# Patient Record
Sex: MALE | Race: WHITE | NOT HISPANIC OR LATINO | Employment: FULL TIME | URBAN - NONMETROPOLITAN AREA
[De-identification: names, ages, dates, MRNs, and addresses within clinical notes are randomized per-mention and may not be internally consistent; named-entity substitution may affect disease eponyms.]

---

## 2017-02-07 ENCOUNTER — APPOINTMENT (OUTPATIENT)
Dept: URGENT CARE | Facility: PHYSICIAN GROUP | Age: 37
End: 2017-02-07
Payer: COMMERCIAL

## 2017-02-07 ENCOUNTER — OCCUPATIONAL MEDICINE (OUTPATIENT)
Dept: URGENT CARE | Facility: PHYSICIAN GROUP | Age: 37
End: 2017-02-07

## 2017-02-07 DIAGNOSIS — Z01.10 ENCOUNTER FOR HEARING TEST: ICD-10-CM

## 2017-02-07 PROCEDURE — 99204 OFFICE O/P NEW MOD 45 MIN: CPT | Performed by: NURSE PRACTITIONER

## 2017-02-07 ASSESSMENT — ENCOUNTER SYMPTOMS
COUGH: 0
SORE THROAT: 0

## 2017-02-07 NOTE — MR AVS SNAPSHOT
Bharath Joe   2017 9:35 AM   Appointment   MRN: [id number]    Department:  [facility]   Dept Phone:  [phone]    Description:  Male : 1980   Provider:  MONTEZ Anne           Allergies as of 2017     No Known Allergies      Vital Signs     Smoking Status                   Former Smoker           Basic Information     Date Of Birth Sex Race Ethnicity Preferred Language    1980 Male Unknown Unknown English      Problem List              ICD-10-CM Priority Class Noted - Resolved    Back pain M54.9   2011 - Present      Health Maintenance        Date Due Completion Dates    IMM DTaP/Tdap/Td Vaccine (1 - Tdap) 10/9/1998 ---    IMM INFLUENZA (1) 2016 ---            Current Immunizations     No immunizations on file.      Below and/or attached are the medications your provider expects you to take. Review all of your home medications and newly ordered medications with your provider and/or pharmacist. Follow medication instructions as directed by your provider and/or pharmacist. Please keep your medication list with you and share with your provider. Update the information when medications are discontinued, doses are changed, or new medications (including over-the-counter products) are added; and carry medication information at all times in the event of emergency situations     Allergies:  No Known Allergies          Medications  Valid as of: 2017 - 10:10 AM    Generic Name Brand Name Tablet Size Instructions for use    Ibuprofen (Tab) MOTRIN 200 MG Take 400 mg by mouth every 8 hours as needed.        Ibuprofen (Tab) MOTRIN 800 MG Take 1 Tab by mouth every 8 hours as needed for Mild Pain.        Methocarbamol (Tab) ROBAXIN 750 MG Take 1 Tab by mouth 3 times a day.        MethylPREDNISolone (Tab) MEDROL DOSPACK 4 MG Take  by mouth every day. follow package directions        TraMADol HCl (Tab) ULTRAM 50 MG Take 1-2 Tabs by mouth every 8 hours as  needed for Mild Pain.        TraMADol HCl (Tab) ULTRAM 50 MG Take 1-2 Tabs by mouth every four hours as needed for Mild Pain.        .                 Medicines prescribed today were sent to:     [id number] - [place], [place] - [address] AT [facility] [address]    [address] [address]    Phone: [phone] Fax: [phone]    Open 24 Hours?: No      Medication refill instructions:       If your prescription bottle indicates you have medication refills left, it is not necessary to call your provider’s office. Please contact your pharmacy and they will refill your medication.    If your prescription bottle indicates you do not have any refills left, you may request refills at any time through one of the following ways: The online SpareFoot system (except Urgent Care), by calling your provider’s office, or by asking your pharmacy to contact your provider’s office with a refill request. Medication refills are processed only during regular business hours and may not be available until the next business day. Your provider may request additional information or to have a follow-up visit with you prior to refilling your medication.   *Please Note: Medication refills are assigned a new Rx number when refilled electronically. Your pharmacy may indicate that no refills were authorized even though a new prescription for the same medication is available at the pharmacy. Please request the medicine by name with the pharmacy before contacting your provider for a refill.           SpareFoot Access Code: [email or web address]  Expires: 3/9/2017 10:10 AM    Your email address is not on file at [employer] [facility].  Email Addresses are required for you to sign up for SpareFoot, please contact [phone] to verify your personal information and to provide your email address prior to attempting to register for SpareFoot.    Bharath Diaz  [address] [place] [address]    SpareFoot  A secure, online tool to manage  your health information     [facility] [employer]’s Safer Minicabst® is a secure, online tool that connects you to your personalized health information from the privacy of your home -- day or night - making it very easy for you to manage your healthcare. Once the activation process is completed, you can even access your medical information using the Safer Minicabst gregory, which is available for free in the Apple Gregory store or Google Play store.     To learn more about BlogBus, visit [email or web address]    There are two levels of access available (as shown below):   My Chart Features  [facility] Primary Care Doctor [facility]  Specialists [facility]  Urgent  Care Non-[facility] Primary Care Doctor   Email your healthcare team securely and privately 24/7 X X X    Manage appointments: schedule your next appointment; view details of past/upcoming appointments X      Request prescription refills. X      View recent personal medical records, including lab and immunizations X X X X   View health record, including health history, allergies, medications X X X X   Read reports about your outpatient visits, procedures, consult and ER notes X X X X   See your discharge summary, which is a recap of your hospital and/or ER visit that includes your diagnosis, lab results, and care plan X X  X     How to register for BlogBus:  Once your e-mail address has been verified, follow the following steps to sign up for BlogBus.     1. Go to  [email or web address]  2. Click on the Sign Up Now box, which takes you to the New Member Sign Up page. You will need to provide the following information:  a. Enter your BlogBus Access Code exactly as it appears at the top of this page. (You will not need to use this code after you’ve completed the sign-up process. If you do not sign up before the expiration date, you must request a new code.)   b. Enter your date of birth.   c. Enter your home email address.   d. Click Submit, and follow the next screen’s instructions.  3. Create a  MyChart ID. This will be your MojoPagest login ID and cannot be changed, so think of one that is secure and easy to remember.  4. Create a AqueSys password. You can change your password at any time.  5. Enter your Password Reset Question and Answer. This can be used at a later time if you forget your password.   6. Enter your e-mail address. This allows you to receive e-mail notifications when new information is available in AqueSys.  7. Click Sign Up. You can now view your health information.    For assistance activating your AqueSys account, call [phone]

## 2017-02-08 NOTE — PROGRESS NOTES
Subjective:      Bharath Diaz is a 36 y.o. male who presents with No chief complaint on file.            HPI Comments:   Routine occupational audiometry due to exposure to loud noises at work.      Review of Systems   HENT: Negative for congestion, ear discharge, ear pain, hearing loss, sore throat and tinnitus.    Respiratory: Negative for cough.           Objective:     There were no vitals taken for this visit.     Physical Exam   HENT:   Right Ear: Tympanic membrane and ear canal normal. Tympanic membrane is not perforated and not erythematous.   Left Ear: Tympanic membrane and ear canal normal. Tympanic membrane is not perforated and not erythematous.   Vitals reviewed.              Assessment/Plan:       1. Encounter for hearing test         Audiometry reviewed. No significant abnormalities

## 2017-06-02 ENCOUNTER — NON-PROVIDER VISIT (OUTPATIENT)
Dept: URGENT CARE | Facility: CLINIC | Age: 37
End: 2017-06-02

## 2017-06-02 DIAGNOSIS — Z02.1 PRE-EMPLOYMENT DRUG SCREENING: ICD-10-CM

## 2017-06-02 LAB
AMP AMPHETAMINE: NORMAL
COC COCAINE: NORMAL
INT CON NEG: NORMAL
INT CON POS: NORMAL
MET METHAMPHETAMINES: NORMAL
OPI OPIATES: NORMAL
PCP PHENCYCLIDINE: NORMAL
POC DRUG COMMENT 753798-OCCUPATIONAL HEALTH: NORMAL
THC: NORMAL

## 2017-06-02 PROCEDURE — 80305 DRUG TEST PRSMV DIR OPT OBS: CPT | Performed by: NURSE PRACTITIONER

## 2017-06-07 ENCOUNTER — OFFICE VISIT (OUTPATIENT)
Dept: URGENT CARE | Facility: PHYSICIAN GROUP | Age: 37
End: 2017-06-07
Payer: COMMERCIAL

## 2017-06-07 VITALS
BODY MASS INDEX: 37.2 KG/M2 | DIASTOLIC BLOOD PRESSURE: 86 MMHG | HEIGHT: 67 IN | SYSTOLIC BLOOD PRESSURE: 130 MMHG | HEART RATE: 100 BPM | OXYGEN SATURATION: 96 % | WEIGHT: 237 LBS | TEMPERATURE: 99.1 F | RESPIRATION RATE: 18 BRPM

## 2017-06-07 DIAGNOSIS — R09.81 NASAL CONGESTION: ICD-10-CM

## 2017-06-07 PROCEDURE — 99214 OFFICE O/P EST MOD 30 MIN: CPT | Performed by: PHYSICIAN ASSISTANT

## 2017-06-07 RX ORDER — FLUTICASONE PROPIONATE 50 MCG
2 SPRAY, SUSPENSION (ML) NASAL DAILY
Qty: 1 BOTTLE | Refills: 1 | Status: SHIPPED | OUTPATIENT
Start: 2017-06-07 | End: 2017-10-24

## 2017-06-07 ASSESSMENT — ENCOUNTER SYMPTOMS
WHEEZING: 0
COUGH: 1
SHORTNESS OF BREATH: 1
FEVER: 0
CHILLS: 0

## 2017-06-07 NOTE — Clinical Note
June 7, 2017         Patient: Bharath Diaz   YOB: 1980   Date of Visit: 6/7/2017           To Whom it May Concern:    Bharath Diaz was seen in my clinic on 6/7/2017.   If you have any questions or concerns, please don't hesitate to call.        Sincerely,           VERNON Hope.  Electronically Signed

## 2017-06-07 NOTE — MR AVS SNAPSHOT
"Bharath Diaz   2017 5:25 PM   Office Visit   MRN: [id number]    Department:  [facility]   Dept Phone:  [phone]    Description:  Male : 1980   Provider:  JEAN Hope           Reason for Visit     Other dr note      Allergies as of 2017     No Known Allergies      You were diagnosed with     Nasal congestion   [[id number]]         Vital Signs     Blood Pressure Pulse Temperature Respirations Height Weight    130/86 mmHg 100 37.3 °C (99.1 °F) 18 1.702 m (5' 7.01\") 107.502 kg (237 lb)    Body Mass Index Oxygen Saturation Smoking Status             37.11 kg/m2 96% Former Smoker         Basic Information     Date Of Birth Sex Race Ethnicity Preferred Language    1980 Male Unknown Unknown English      Problem List              ICD-10-CM Priority Class Noted - Resolved    Back pain M54.9   2011 - Present      Health Maintenance        Date Due Completion Dates    IMM DTaP/Tdap/Td Vaccine (1 - Tdap) 10/9/1998 ---            Current Immunizations     No immunizations on file.      Below and/or attached are the medications your provider expects you to take. Review all of your home medications and newly ordered medications with your provider and/or pharmacist. Follow medication instructions as directed by your provider and/or pharmacist. Please keep your medication list with you and share with your provider. Update the information when medications are discontinued, doses are changed, or new medications (including over-the-counter products) are added; and carry medication information at all times in the event of emergency situations     Allergies:  No Known Allergies          Medications  Valid as of: 2017 -  5:49 PM    Generic Name Brand Name Tablet Size Instructions for use    .                 Medicines prescribed today were sent to:     [id number] - [place], [address] AT [facility] [address]     [address] [place] [address]   " Phone: [phone] Fax: [phone]    Open 24 Hours?: No      Medication refill instructions:       If your prescription bottle indicates you have medication refills left, it is not necessary to call your provider’s office. Please contact your pharmacy and they will refill your medication.    If your prescription bottle indicates you do not have any refills left, you may request refills at any time through one of the following ways: The online [employer] system (except Urgent Care), by calling your provider’s office, or by asking your pharmacy to contact your provider’s office with a refill request. Medication refills are processed only during regular business hours and may not be available until the next business day. Your provider may request additional information or to have a follow-up visit with you prior to refilling your medication.   *Please Note: Medication refills are assigned a new Rx number when refilled electronically. Your pharmacy may indicate that no refills were authorized even though a new prescription for the same medication is available at the pharmacy. Please request the medicine by name with the pharmacy before contacting your provider for a refill.        Instructions    Use Tylenol and/or ibuprofen as needed for pain or fever.  Use a Michael Med, Neti Pot, or other nasal irrigation device daily.  Use Flonase daily.  May try a short course of a decongestant such as Sudafed.  May try Afrin nasal spray for 3-5 days and then discontinue use.  May try an over-the-counter antihistamine such as Zyrtec, Claritin, or Allegra.  Use a cool mist humidifier with distilled water.  Elevate the head of your bed a few inches.  Drink plenty of fluids and get adequate rest.  Follow up with primary care provider in a few days if not improving.  Return for new or worsening symptoms.            Nar[employer]ana Technologies Access Code: [email or web address]  Expires: 7/2/2017  3:51 PM    Your email address is not on file at [facility].   Email Addresses are required for you to sign up for Ai2 UK, please contact [phone] to verify your personal information and to provide your email address prior to attempting to register for Ai2 UK.    Bharath Joe  [address] [place] [address]    Ai2 UK  A secure, online tool to manage your health information     [employer]’s Ai2 UK® is a secure, online tool that connects you to your personalized health information from the privacy of your home -- day or night - making it very easy for you to manage your healthcare. Once the activation process is completed, you can even access your medical information using the Ai2 UK gregory, which is available for free in the Apple Gregory store or Google Play store.     To learn more about Ai2 UK, visit [email or web address]    There are two levels of access available (as shown below):   My Chart Features  [facility] Primary [facility] Doctor [facility]  Specialists [facility]  Urgent  Care [facility] Primary Care Doctor   Email your healthcare team securely and privately 24/7 X X X    Manage appointments: schedule your next appointment; view details of past/upcoming appointments X      Request prescription refills. X      View recent personal medical records, including lab and immunizations X X X X   View health record, including health history, allergies, medications X X X X   Read reports about your outpatient visits, procedures, consult and ER notes X X X X   See your discharge summary, which is a recap of your hospital and/or ER visit that includes your diagnosis, lab results, and care plan X X  X     How to register for iZocat:  Once your e-mail address has been verified, follow the following steps to sign up for iZocat.     1. Go to  [email or web address]  2. Click on the Sign Up Now box, which takes you to the New Member Sign Up page. You will need to provide the following information:  a. Enter your Ai2 UK Access Code exactly as it appears at the top of this  page. (You will not need to use this code after you’ve completed the sign-up process. If you do not sign up before the expiration date, you must request a new code.)   b. Enter your date of birth.   c. Enter your home email address.   d. Click Submit, and follow the next screen’s instructions.  3. Create a fsboWOW ID. This will be your fsboWOW login ID and cannot be changed, so think of one that is secure and easy to remember.  4. Create a fsboWOW password. You can change your password at any time.  5. Enter your Password Reset Question and Answer. This can be used at a later time if you forget your password.   6. Enter your e-mail address. This allows you to receive e-mail notifications when new information is available in fsboWOW.  7. Click Sign Up. You can now view your health information.    For assistance activating your fsboWOW account, call [phone]

## 2017-06-08 NOTE — PATIENT INSTRUCTIONS
Use Tylenol and/or ibuprofen as needed for pain or fever.  Use a Michael Med, Neti Pot, or other nasal irrigation device daily.  Use Flonase daily.  May try a short course of a decongestant such as Sudafed.  May try Afrin nasal spray for 3-5 days and then discontinue use.  May try an over-the-counter antihistamine such as Zyrtec, Claritin, or Allegra.  Use a cool mist humidifier with distilled water.  Elevate the head of your bed a few inches.  Drink plenty of fluids and get adequate rest.  Follow up with primary care provider in a few days if not improving.  Return for new or worsening symptoms.

## 2017-06-08 NOTE — PROGRESS NOTES
"Subjective:      Bharath Diaz is a 36 y.o. male who presents with Other            HPI Comments: Patient presents today with nasal congestion, cough, and occasional shortness of breath for the last couple of weeks to months. 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 He has used OTC decongestants with mild improvement in nasal symptoms. No fevers, chills, or other complaints.    Other  This is a new problem. The current episode started more than 1 month ago. The problem occurs constantly. The problem has been waxing and waning. Associated symptoms include congestion and coughing. Pertinent negatives include no chills or fever. Exacerbated by: dust. Treatments tried: OTC decongestant. The treatment provided mild relief.       Review of Systems   Constitutional: Negative for fever and chills.   HENT: Positive for congestion.    Respiratory: Positive for cough and shortness of breath. Negative for wheezing.        Allergies:Review of patient's allergies indicates no known allergies.    Current Outpatient Prescriptions Ordered in [facility]   Medication Sig Dispense Refill   • fluticasone (FLONASE) 50 MCG/ACT nasal spray Spray 2 Sprays in nose every day. 1 Bottle 1     No current Epic-ordered facility-administered medications on file.       History reviewed. No pertinent past medical history.    Social History   Substance Use Topics   • Smoking status: Former Smoker -- 0.50 packs/day for 18 years     Types: Cigarettes     Quit date: 09/12/2011   • Smokeless tobacco: Never Used   • Alcohol Use: No      Comment: binge drinker- intreatment now 04/10/11       No family status information on file.     Family History   Problem Relation Age of Onset   • Thyroid Mother         Objective:     /86 mmHg  Pulse 100  Temp(Src) 37.3 °C (99.1 °F)  Resp 18  Ht 1.702 m (5' 7.01\")  Wt 107.502 kg (237 lb)  BMI 37.11 kg/m2  SpO2 96%     Physical Exam "   Constitutional: He is oriented to person, place, and time. He appears well-developed and well-nourished. No distress.   HENT:   Head: Normocephalic and atraumatic.   Right Ear: External ear normal.   Left Ear: External ear normal.   Mouth/Throat: Oropharynx is clear and moist.   Moderate nasal mucosal edema. Turbinates are erythematous. Small amount of clear nasal discharge present. No sinus tenderness   Eyes: Right eye exhibits no discharge. Left eye exhibits no discharge.   Neck: Normal range of motion. Neck supple.   Cardiovascular: Normal rate and regular rhythm.    Pulmonary/Chest: Effort normal and breath sounds normal. He has no wheezes. He has no rales.   Nonproductive cough   Neurological: He is alert and oriented to person, place, and time.   Skin: Skin is warm and dry. He is not diaphoretic.   Psychiatric: He has a normal mood and affect. His behavior is normal. Judgment and thought content normal.   Nursing note and vitals reviewed.              Assessment/Plan:     1. Nasal congestion  fluticasone (FLONASE) 50 MCG/ACT nasal spray    Nasal mucosal edema with small amount of clear nasal mucus and no sinus tenderness. Recommended Flonase and irrigation. Follow-up with PCP       Use Tylenol and/or ibuprofen as needed for pain or fever.  Use a Michael Med, Neti Pot, or other nasal irrigation device daily.  Use Flonase daily.  May try a short course of a decongestant such as Sudafed.  May try Afrin nasal spray for 3-5 days and then discontinue use.  May try an over-the-counter antihistamine such as Zyrtec, Claritin, or Allegra.  Use a cool mist humidifier with distilled water.  Elevate the head of your bed a few inches.  Drink plenty of fluids and get adequate rest.  Follow up with primary care provider in a few days if not improving.  Return for new or worsening symptoms.      Please note that this dictation was created using voice recognition software. I have made every reasonable attempt to correct obvious  errors, but I expect that there are errors of grammar and possibly content that I did not discover before finalizing the note.

## 2017-06-15 ENCOUNTER — NON-PROVIDER VISIT (OUTPATIENT)
Dept: URGENT CARE | Facility: PHYSICIAN GROUP | Age: 37
End: 2017-06-15

## 2017-06-15 DIAGNOSIS — Z02.1 PRE-EMPLOYMENT DRUG SCREENING: ICD-10-CM

## 2017-06-15 LAB
AMP AMPHETAMINE: NORMAL
COC COCAINE: NORMAL
INT CON NEG: NEGATIVE
INT CON POS: POSITIVE
MET METHAMPHETAMINES: NORMAL
OPI OPIATES: NORMAL
PCP PHENCYCLIDINE: NORMAL
POC DRUG COMMENT 753798-OCCUPATIONAL HEALTH: NEGATIVE
THC: NORMAL

## 2017-06-15 PROCEDURE — 80305 DRUG TEST PRSMV DIR OPT OBS: CPT | Performed by: FAMILY MEDICINE

## 2017-10-24 ENCOUNTER — OFFICE VISIT (OUTPATIENT)
Dept: URGENT CARE | Facility: PHYSICIAN GROUP | Age: 37
End: 2017-10-24
Payer: COMMERCIAL

## 2017-10-24 VITALS
SYSTOLIC BLOOD PRESSURE: 120 MMHG | OXYGEN SATURATION: 97 % | HEART RATE: 97 BPM | HEIGHT: 67 IN | WEIGHT: 220 LBS | DIASTOLIC BLOOD PRESSURE: 72 MMHG | TEMPERATURE: 98.5 F | RESPIRATION RATE: 16 BRPM | BODY MASS INDEX: 34.53 KG/M2

## 2017-10-24 DIAGNOSIS — R10.84 GENERALIZED ABDOMINAL PAIN: ICD-10-CM

## 2017-10-24 PROCEDURE — 99214 OFFICE O/P EST MOD 30 MIN: CPT | Performed by: PHYSICIAN ASSISTANT

## 2017-10-24 ASSESSMENT — ENCOUNTER SYMPTOMS
NAUSEA: 0
ANOREXIA: 1
FEVER: 0
VOMITING: 0
ABDOMINAL PAIN: 1
CONSTIPATION: 0
CHILLS: 1
DIARRHEA: 1

## 2017-10-25 NOTE — PATIENT INSTRUCTIONS
"Smoking Cessation, Tips for Success  If you are ready to quit smoking, congratulations! You have chosen to help yourself be healthier. Cigarettes bring nicotine, tar, carbon monoxide, and other irritants into your body. Your lungs, heart, and blood vessels will be able to work better without these poisons. There are many different ways to quit smoking. Nicotine gum, nicotine patches, a nicotine inhaler, or nicotine nasal spray can help with physical craving. Hypnosis, support groups, and medicines help break the habit of smoking.  WHAT THINGS CAN I DO TO MAKE QUITTING EASIER?   Here are some tips to help you quit for good:  · Pick a date when you will quit smoking completely. Tell all of your friends and family about your plan to quit on that date.  · Do not try to slowly cut down on the number of cigarettes you are smoking. Pick a quit date and quit smoking completely starting on that day.  · Throw away all cigarettes.    · Clean and remove all ashtrays from your home, work, and car.  · On a card, write down your reasons for quitting. Carry the card with you and read it when you get the urge to smoke.  · Cleanse your body of nicotine. Drink enough water and fluids to keep your urine clear or pale yellow. Do this after quitting to flush the nicotine from your body.  · Learn to predict your moods. Do not let a bad situation be your excuse to have a cigarette. Some situations in your life might tempt you into wanting a cigarette.  · Never have \"just one\" cigarette. It leads to wanting another and another. Remind yourself of your decision to quit.  · Change habits associated with smoking. If you smoked while driving or when feeling stressed, try other activities to replace smoking. Stand up when drinking your coffee. Brush your teeth after eating. Sit in a different chair when you read the paper. Avoid alcohol while trying to quit, and try to drink fewer caffeinated beverages. Alcohol and caffeine may urge you to " "smoke.  · Avoid foods and drinks that can trigger a desire to smoke, such as sugary or spicy foods and alcohol.  · Ask people who smoke not to smoke around you.  · Have something planned to do right after eating or having a cup of coffee. For example, plan to take a walk or exercise.  · Try a relaxation exercise to calm you down and decrease your stress. Remember, you may be tense and nervous for the first 2 weeks after you quit, but this will pass.  · Find new activities to keep your hands busy. Play with a pen, coin, or rubber band. Doodle or draw things on paper.  · Brush your teeth right after eating. This will help cut down on the craving for the taste of tobacco after meals. You can also try mouthwash.    · Use oral substitutes in place of cigarettes. Try using lemon drops, carrots, cinnamon sticks, or chewing gum. Keep them handy so they are available when you have the urge to smoke.  · When you have the urge to smoke, try deep breathing.  · Designate your home as a nonsmoking area.  · If you are a heavy smoker, ask your health care provider about a prescription for nicotine chewing gum. It can ease your withdrawal from nicotine.  · Reward yourself. Set aside the cigarette money you save and buy yourself something nice.  · Look for support from others. Join a support group or smoking cessation program. Ask someone at home or at work to help you with your plan to quit smoking.  · Always ask yourself, \"Do I need this cigarette or is this just a reflex?\" Tell yourself, \"Today, I choose not to smoke,\" or \"I do not want to smoke.\" You are reminding yourself of your decision to quit.  · Do not replace cigarette smoking with electronic cigarettes (commonly called e-cigarettes). The safety of e-cigarettes is unknown, and some may contain harmful chemicals.  · If you relapse, do not give up! Plan ahead and think about what you will do the next time you get the urge to smoke.  HOW WILL I FEEL WHEN I QUIT SMOKING?  You " may have symptoms of withdrawal because your body is used to nicotine (the addictive substance in cigarettes). You may crave cigarettes, be irritable, feel very hungry, cough often, get headaches, or have difficulty concentrating. The withdrawal symptoms are only temporary. They are strongest when you first quit but will go away within 10-14 days. When withdrawal symptoms occur, stay in control. Think about your reasons for quitting. Remind yourself that these are signs that your body is healing and getting used to being without cigarettes. Remember that withdrawal symptoms are easier to treat than the major diseases that smoking can cause.   Even after the withdrawal is over, expect periodic urges to smoke. However, these cravings are generally short lived and will go away whether you smoke or not. Do not smoke!  WHAT RESOURCES ARE AVAILABLE TO HELP ME QUIT SMOKING?  Your health care provider can direct you to community resources or hospitals for support, which may include:  · Group support.  · Education.  · Hypnosis.  · Therapy.     This information is not intended to replace advice given to you by your health care provider. Make sure you discuss any questions you have with your health care provider.     Document Released: 09/15/2005 Document Revised: 01/08/2016 Document Reviewed: 06/05/2014  [employer] Interactive Patient Education ©2016 [employer]

## 2017-10-25 NOTE — PROGRESS NOTES
"Subjective:      Bharath Diaz is a 37 y.o. male who presents with Diarrhea (diarrhea, stomach cramps, dizziness x3days )            Abdominal pain and diarrhea for 3 days. Also reports cramping and dizziness. Reports numerous episodes of painful diarrhea. Unable to go to work due to current symptoms. Denies fever, chills, recent illness, recent exposure, recent travel, or antibiotic use.      Abdominal Pain   This is a new problem. The current episode started in the past 7 days. The problem occurs constantly. The problem has been gradually worsening. The pain is located in the generalized abdominal region. The pain is severe. The quality of the pain is cramping. The abdominal pain does not radiate. Associated symptoms include anorexia and diarrhea. Pertinent negatives include no constipation, fever, frequency, melena, nausea or vomiting. The pain is aggravated by eating, movement and palpation. The pain is relieved by nothing. He has tried nothing for the symptoms. The treatment provided no relief.       Review of Systems   Constitutional: Positive for chills. Negative for fever.   Gastrointestinal: Positive for abdominal pain, anorexia and diarrhea. Negative for constipation, melena, nausea and vomiting.   Genitourinary: Negative for frequency.          Objective:     /72   Pulse 97   Temp 36.9 °C (98.5 °F)   Resp 16   Ht 1.702 m (5' 7\")   Wt 99.8 kg (220 lb)   SpO2 97%   BMI 34.46 kg/m²      Physical Exam   Constitutional: He is oriented to person, place, and time. He appears well-developed and well-nourished. No distress.   HENT:   Head: Normocephalic and atraumatic.   Eyes: Right eye exhibits no discharge. Left eye exhibits no discharge.   Neck: Normal range of motion. Neck supple.   Cardiovascular: Normal rate and regular rhythm.    Pulmonary/Chest: Effort normal and breath sounds normal.   Abdominal: Soft. Bowel sounds are normal. He exhibits no distension. There is tenderness. There is " guarding.   Neurological: He is alert and oriented to person, place, and time.   Skin: Skin is warm and dry. He is not diaphoretic.   Psychiatric: He has a normal mood and affect. His behavior is normal. Judgment and thought content normal.   Nursing note and vitals reviewed.              Assessment/Plan:     1. Generalized abdominal pain      Ongoing for 3 days with diarrhea. No fever. Significant diffuse tenderness. Sent to ER for further evaluation. Discussed with REN[facility] Horta at [facility] who accepts patient. Transported POV across the street with wife          Please note that this dictation was created using voice recognition software. I have made every reasonable attempt to correct obvious errors, but I expect that there are errors of grammar and possibly content that I did not discover before finalizing the note.

## 2017-11-11 ENCOUNTER — NON-PROVIDER VISIT (OUTPATIENT)
Dept: URGENT CARE | Facility: PHYSICIAN GROUP | Age: 37
End: 2017-11-11
Payer: COMMERCIAL

## 2017-11-11 DIAGNOSIS — Z02.1 PRE-EMPLOYMENT DRUG SCREENING: ICD-10-CM

## 2017-11-11 LAB
AMP AMPHETAMINE: NORMAL
COC COCAINE: NORMAL
INT CON NEG: NORMAL
INT CON POS: NORMAL
MET METHAMPHETAMINES: NORMAL
OPI OPIATES: NORMAL
PCP PHENCYCLIDINE: NORMAL
POC DRUG COMMENT 753798-OCCUPATIONAL HEALTH: NEGATIVE
THC: NORMAL

## 2017-11-11 PROCEDURE — 80305 DRUG TEST PRSMV DIR OPT OBS: CPT | Performed by: PHYSICIAN ASSISTANT

## 2017-12-07 ENCOUNTER — SUPERVISING PHYSICIAN REVIEW (OUTPATIENT)
Dept: URGENT CARE | Facility: CLINIC | Age: 37
End: 2017-12-07

## 2018-01-30 ENCOUNTER — OFFICE VISIT (OUTPATIENT)
Dept: URGENT CARE | Facility: PHYSICIAN GROUP | Age: 38
End: 2018-01-30
Payer: COMMERCIAL

## 2018-01-30 VITALS
SYSTOLIC BLOOD PRESSURE: 116 MMHG | HEIGHT: 67 IN | RESPIRATION RATE: 16 BRPM | DIASTOLIC BLOOD PRESSURE: 68 MMHG | WEIGHT: 226 LBS | BODY MASS INDEX: 35.47 KG/M2 | OXYGEN SATURATION: 97 % | TEMPERATURE: 98.6 F | HEART RATE: 62 BPM

## 2018-01-30 DIAGNOSIS — B35.3 TINEA PEDIS OF RIGHT FOOT: ICD-10-CM

## 2018-01-30 PROCEDURE — 99214 OFFICE O/P EST MOD 30 MIN: CPT | Performed by: PHYSICIAN ASSISTANT

## 2018-01-30 RX ORDER — PRENATAL VIT 91/IRON/FOLIC/DHA 28-975-200
1 COMBINATION PACKAGE (EA) ORAL 2 TIMES DAILY
Qty: 15 G | Refills: 0 | Status: SHIPPED | OUTPATIENT
Start: 2018-01-30 | End: 2018-02-13

## 2018-01-30 ASSESSMENT — ENCOUNTER SYMPTOMS
MYALGIAS: 0
FEVER: 0
CHILLS: 0

## 2018-01-30 NOTE — PATIENT INSTRUCTIONS
Athlete's Foot  Athlete's foot (tinea pedis) is a fungal infection of the skin on the feet. It often occurs on the skin between the toes or underneath the toes. It can also occur on the soles of the feet. Athlete's foot is more likely to occur in hot, humid weather. Not washing your feet or changing your socks often enough can contribute to athlete's foot. The infection can spread from person to person (contagious).  CAUSES  Athlete's foot is caused by a fungus. This fungus thrives in warm, moist places. Most people get athlete's foot by sharing shower stalls, towels, and wet floors with an infected person. People with weakened immune systems, including those with diabetes, may be more likely to get athlete's foot.  SYMPTOMS   · Itchy areas between the toes or on the soles of the feet.  · White, flaky, or scaly areas between the toes or on the soles of the feet.  · Tiny, intensely itchy blisters between the toes or on the soles of the feet.  · Tiny cuts on the skin. These cuts can develop a bacterial infection.  · Thick or discolored toenails.  DIAGNOSIS   Your caregiver can usually tell what the problem is by doing a physical exam. Your caregiver may also take a skin sample from the rash area. The skin sample may be examined under a microscope, or it may be tested to see if fungus will grow in the sample. A sample may also be taken from your toenail for testing.  TREATMENT   Over-the-counter and prescription medicines can be used to kill the fungus. These medicines are available as powders or creams. Your caregiver can suggest medicines for you. Fungal infections respond slowly to treatment. You may need to continue using your medicine for several weeks.  PREVENTION   · Do not share towels.  · Wear sandals in wet areas, such as shared locker rooms and shared showers.  · Keep your feet dry. Wear shoes that allow air to circulate. Wear cotton or wool socks.  HOME CARE INSTRUCTIONS   · Take medicines as directed by  your caregiver. Do not use steroid creams on athlete's foot.  · Keep your feet clean and cool. Wash your feet daily and dry them thoroughly, especially between your toes.  · Change your socks every day. Wear cotton or wool socks. In hot climates, you may need to change your socks 2 to 3 times per day.  · Wear sandals or canvas tennis shoes with good air circulation.  · If you have blisters, soak your feet in Burow's solution or Epsom salts for 20 to 30 minutes, 2 times a day to dry out the blisters. Make sure you dry your feet thoroughly afterward.  SEEK MEDICAL CARE IF:   · You have a fever.  · You have swelling, soreness, warmth, or redness in your foot.  · You are not getting better after 7 days of treatment.  · You are not completely cured after 30 days.  · You have any problems caused by your medicines.  MAKE SURE YOU:   · Understand these instructions.  · Will watch your condition.  · Will get help right away if you are not doing well or get worse.     This information is not intended to replace advice given to you by your health care provider. Make sure you discuss any questions you have with your health care provider.     Document Released: 12/15/2001 Document Revised: 03/11/2013 Document Reviewed: 10/05/2012  [employer] Interactive Patient Education ©2016 [employer]      Smoking Cessation, Tips for Success  If you are ready to quit smoking, congratulations! You have chosen to help yourself be healthier. Cigarettes bring nicotine, tar, carbon monoxide, and other irritants into your body. Your lungs, heart, and blood vessels will be able to work better without these poisons. There are many different ways to quit smoking. Nicotine gum, nicotine patches, a nicotine inhaler, or nicotine nasal spray can help with physical craving. Hypnosis, support groups, and medicines help break the habit of smoking.  WHAT THINGS CAN I DO TO MAKE QUITTING EASIER?   Here are some tips to help you quit for good:  · Pick a date  "when you will quit smoking completely. Tell all of your friends and family about your plan to quit on that date.  · Do not try to slowly cut down on the number of cigarettes you are smoking. Pick a quit date and quit smoking completely starting on that day.  · Throw away all cigarettes.    · Clean and remove all ashtrays from your home, work, and car.  · On a card, write down your reasons for quitting. Carry the card with you and read it when you get the urge to smoke.  · Cleanse your body of nicotine. Drink enough water and fluids to keep your urine clear or pale yellow. Do this after quitting to flush the nicotine from your body.  · Learn to predict your moods. Do not let a bad situation be your excuse to have a cigarette. Some situations in your life might tempt you into wanting a cigarette.  · Never have \"just one\" cigarette. It leads to wanting another and another. Remind yourself of your decision to quit.  · Change habits associated with smoking. If you smoked while driving or when feeling stressed, try other activities to replace smoking. Stand up when drinking your coffee. Brush your teeth after eating. Sit in a different chair when you read the paper. Avoid alcohol while trying to quit, and try to drink fewer caffeinated beverages. Alcohol and caffeine may urge you to smoke.  · Avoid foods and drinks that can trigger a desire to smoke, such as sugary or spicy foods and alcohol.  · Ask people who smoke not to smoke around you.  · Have something planned to do right after eating or having a cup of coffee. For example, plan to take a walk or exercise.  · Try a relaxation exercise to calm you down and decrease your stress. Remember, you may be tense and nervous for the first 2 weeks after you quit, but this will pass.  · Find new activities to keep your hands busy. Play with a pen, coin, or rubber band. Doodle or draw things on paper.  · Brush your teeth right after eating. This will help cut down on the craving " "for the taste of tobacco after meals. You can also try mouthwash.    · Use oral substitutes in place of cigarettes. Try using lemon drops, carrots, cinnamon sticks, or chewing gum. Keep them handy so they are available when you have the urge to smoke.  · When you have the urge to smoke, try deep breathing.  · Designate your home as a nonsmoking area.  · If you are a heavy smoker, ask your health care provider about a prescription for nicotine chewing gum. It can ease your withdrawal from nicotine.  · Reward yourself. Set aside the cigarette money you save and buy yourself something nice.  · Look for support from others. Join a support group or smoking cessation program. Ask someone at home or at work to help you with your plan to quit smoking.  · Always ask yourself, \"Do I need this cigarette or is this just a reflex?\" Tell yourself, \"Today, I choose not to smoke,\" or \"I do not want to smoke.\" You are reminding yourself of your decision to quit.  · Do not replace cigarette smoking with electronic cigarettes (commonly called e-cigarettes). The safety of e-cigarettes is unknown, and some may contain harmful chemicals.  · If you relapse, do not give up! Plan ahead and think about what you will do the next time you get the urge to smoke.  HOW WILL I FEEL WHEN I QUIT SMOKING?  You may have symptoms of withdrawal because your body is used to nicotine (the addictive substance in cigarettes). You may crave cigarettes, be irritable, feel very hungry, cough often, get headaches, or have difficulty concentrating. The withdrawal symptoms are only temporary. They are strongest when you first quit but will go away within 10-14 days. When withdrawal symptoms occur, stay in control. Think about your reasons for quitting. Remind yourself that these are signs that your body is healing and getting used to being without cigarettes. Remember that withdrawal symptoms are easier to treat than the major diseases that smoking can cause. "   Even after the withdrawal is over, expect periodic urges to smoke. However, these cravings are generally short lived and will go away whether you smoke or not. Do not smoke!  WHAT RESOURCES ARE AVAILABLE TO HELP ME QUIT SMOKING?  Your health care provider can direct you to community resources or hospitals for support, which may include:  · Group support.  · Education.  · Hypnosis.  · Therapy.     This information is not intended to replace advice given to you by your health care provider. Make sure you discuss any questions you have with your health care provider.     Document Released: 09/15/2005 Document Revised: 01/08/2016 Document Reviewed: 06/05/2014  [employer] Interactive Patient Education ©2016 [employer]

## 2018-01-30 NOTE — PROGRESS NOTES
"Subjective:      Bharath Diaz is a 37 y.o. male who presents with Foot Problem (x6days R foot)            Patient resents today with suspected athlete's foot between his right fourth and fifth toes which has been present for the last 6 days and seems to be worsening despite an antifungal spray that he has been using. Denies any other complaints.      Foot Problem   This is a new problem. The current episode started in the past 7 days. The problem occurs constantly. The problem has been gradually worsening. Pertinent negatives include no chills, fever, myalgias or rash. Nothing aggravates the symptoms. Treatments tried: antifungal spray. The treatment provided no relief.       Review of Systems   Constitutional: Negative for chills and fever.   Musculoskeletal: Negative for myalgias.   Skin: Positive for itching. Negative for rash.     Allergies:Patient has no known allergies.    Current Outpatient Prescriptions Ordered in [facility]   Medication Sig Dispense Refill   • terbinafine (LAMISIL) 1 % cream Apply 1 Application to affected area(s) 2 times a day for 14 days. 15 g 0     No current Epic-ordered facility-administered medications on file.        History reviewed. No pertinent past medical history.    Social History   Substance Use Topics   • Smoking status: Current Every Day Smoker     Packs/day: 0.00     Years: 18.00     Types: Cigars     Last attempt to quit: 9/12/2011   • Smokeless tobacco: Never Used      Comment: 1 cigar a day    • Alcohol use No      Comment: binge drinker- intreatment now 04/10/11       Family Status   Relation Status   • Mother      Family History   Problem Relation Age of Onset   • Thyroid Mother           Objective:     /68   Pulse 62   Temp 37 °C (98.6 °F)   Resp 16   Ht 1.702 m (5' 7.01\")   Wt 102.5 kg (226 lb)   SpO2 97%   BMI 35.39 kg/m²      Physical Exam   Constitutional: He is oriented to person, place, and time. He appears well-developed and well-nourished. No " distress.   HENT:   Head: Normocephalic and atraumatic.   Eyes: Right eye exhibits no discharge. Left eye exhibits no discharge.   Neck: Normal range of motion. Neck supple.   Cardiovascular: Normal rate.    Pulmonary/Chest: Effort normal.   Neurological: He is alert and oriented to person, place, and time.   Skin: Skin is warm and dry. He is not diaphoretic.   Cracked, dry, irritated skin between the fourth and fifth toes of the right foot. No signs of secondary bacterial infection   Psychiatric: He has a normal mood and affect. His behavior is normal. Judgment and thought content normal.   Nursing note and vitals reviewed.              Assessment/Plan:     1. Tinea pedis of right foot  terbinafine (LAMISIL) 1 % cream    [employer]. Not responding to aerosol. Start terbinafine cream. Follow-up with PCP. Return if worsening. Given written instructions.       [employer] Interactive Patient Education given: Tinea pedis    Please note that this dictation was created using voice recognition software. I have made every reasonable attempt to correct obvious errors, but I expect that there are errors of grammar and possibly content that I did not discover before finalizing the note.

## 2018-03-14 ENCOUNTER — HOSPITAL ENCOUNTER (OUTPATIENT)
Dept: RADIOLOGY | Facility: MEDICAL CENTER | Age: 38
End: 2018-03-14

## 2018-03-14 ENCOUNTER — HOSPITAL ENCOUNTER (OUTPATIENT)
Facility: MEDICAL CENTER | Age: 38
End: 2018-03-14
Attending: EMERGENCY MEDICINE | Admitting: SPECIALIST
Payer: COMMERCIAL

## 2018-03-14 ENCOUNTER — APPOINTMENT (OUTPATIENT)
Dept: RADIOLOGY | Facility: MEDICAL CENTER | Age: 38
End: 2018-03-14
Attending: EMERGENCY MEDICINE
Payer: COMMERCIAL

## 2018-03-14 VITALS
HEART RATE: 73 BPM | TEMPERATURE: 98.9 F | OXYGEN SATURATION: 91 % | BODY MASS INDEX: 34.53 KG/M2 | SYSTOLIC BLOOD PRESSURE: 124 MMHG | WEIGHT: 220 LBS | HEIGHT: 67 IN | DIASTOLIC BLOOD PRESSURE: 72 MMHG | RESPIRATION RATE: 16 BRPM
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
## 2018-03-14 DIAGNOSIS — S02.31XB OPEN FRACTURE OF RIGHT ORBITAL FLOOR, INITIAL ENCOUNTER (HCC): ICD-10-CM

## 2018-03-14 PROBLEM — S02.30XB: Status: ACTIVE | Noted: 2018-03-14

## 2018-03-14 PROBLEM — S02.30XA CLOSED FRACTURE OF ORBITAL FLOOR (HCC): Status: ACTIVE | Noted: 2018-03-14

## 2018-03-14 PROBLEM — F10.929 ALCOHOL INTOXICATION (HCC): Status: ACTIVE | Noted: 2018-03-14

## 2018-03-14 PROBLEM — Z53.09 CONTRAINDICATION TO DEEP VEIN THROMBOSIS (DVT) PROPHYLAXIS: Status: ACTIVE | Noted: 2018-03-14

## 2018-03-14 PROBLEM — T14.90XA TRAUMA: Status: ACTIVE | Noted: 2018-03-14

## 2018-03-14 PROBLEM — S01.111A: Status: ACTIVE | Noted: 2018-03-14

## 2018-03-14 PROCEDURE — 110454 HCHG SHELL REV 250: Performed by: SPECIALIST

## 2018-03-14 PROCEDURE — 700102 HCHG RX REV CODE 250 W/ 637 OVERRIDE(OP)

## 2018-03-14 PROCEDURE — 160036 HCHG PACU - EA ADDL 30 MINS PHASE I: Performed by: SPECIALIST

## 2018-03-14 PROCEDURE — 99291 CRITICAL CARE FIRST HOUR: CPT

## 2018-03-14 PROCEDURE — 700111 HCHG RX REV CODE 636 W/ 250 OVERRIDE (IP): Performed by: NURSE PRACTITIONER

## 2018-03-14 PROCEDURE — G0378 HOSPITAL OBSERVATION PER HR: HCPCS

## 2018-03-14 PROCEDURE — 501404 HCHG SPLINT, NASAL DOYLE AIRWAY: Performed by: SPECIALIST

## 2018-03-14 PROCEDURE — 700111 HCHG RX REV CODE 636 W/ 250 OVERRIDE (IP)

## 2018-03-14 PROCEDURE — 76705 ECHO EXAM OF ABDOMEN: CPT

## 2018-03-14 PROCEDURE — 502573 HCHG PACK, ENT: Performed by: SPECIALIST

## 2018-03-14 PROCEDURE — 160035 HCHG PACU - 1ST 60 MINS PHASE I: Performed by: SPECIALIST

## 2018-03-14 PROCEDURE — 500331 HCHG COTTONOID, SURG PATTIE: Performed by: SPECIALIST

## 2018-03-14 PROCEDURE — 96374 THER/PROPH/DIAG INJ IV PUSH: CPT

## 2018-03-14 PROCEDURE — 501838 HCHG SUTURE GENERAL: Performed by: SPECIALIST

## 2018-03-14 PROCEDURE — 160009 HCHG ANES TIME/MIN: Performed by: SPECIALIST

## 2018-03-14 PROCEDURE — 700105 HCHG RX REV CODE 258: Performed by: EMERGENCY MEDICINE

## 2018-03-14 PROCEDURE — A9270 NON-COVERED ITEM OR SERVICE: HCPCS

## 2018-03-14 PROCEDURE — 160029 HCHG SURGERY MINUTES - 1ST 30 MINS LEVEL 4: Performed by: SPECIALIST

## 2018-03-14 PROCEDURE — 700111 HCHG RX REV CODE 636 W/ 250 OVERRIDE (IP): Performed by: EMERGENCY MEDICINE

## 2018-03-14 PROCEDURE — G0390 TRAUMA RESPONS W/HOSP CRITI: HCPCS

## 2018-03-14 PROCEDURE — 96375 TX/PRO/DX INJ NEW DRUG ADDON: CPT

## 2018-03-14 PROCEDURE — 700101 HCHG RX REV CODE 250

## 2018-03-14 PROCEDURE — 160048 HCHG OR STATISTICAL LEVEL 1-5: Performed by: SPECIALIST

## 2018-03-14 PROCEDURE — 160002 HCHG RECOVERY MINUTES (STAT): Performed by: SPECIALIST

## 2018-03-14 PROCEDURE — 71045 X-RAY EXAM CHEST 1 VIEW: CPT

## 2018-03-14 PROCEDURE — 160041 HCHG SURGERY MINUTES - EA ADDL 1 MIN LEVEL 4: Performed by: SPECIALIST

## 2018-03-14 RX ORDER — ACETAMINOPHEN 325 MG/1
650 TABLET ORAL EVERY 4 HOURS PRN
Status: DISCONTINUED | OUTPATIENT
Start: 2018-03-14 | End: 2018-03-14 | Stop reason: HOSPADM

## 2018-03-14 RX ORDER — BACITRACIN ZINC AND POLYMYXIN B SULFATE 500; 1000 [USP'U]/G; [USP'U]/G
OINTMENT TOPICAL 3 TIMES DAILY
Status: DISCONTINUED | OUTPATIENT
Start: 2018-03-14 | End: 2018-03-14 | Stop reason: HOSPADM

## 2018-03-14 RX ORDER — MORPHINE SULFATE 4 MG/ML
4 INJECTION, SOLUTION INTRAMUSCULAR; INTRAVENOUS
Status: DISCONTINUED | OUTPATIENT
Start: 2018-03-14 | End: 2018-03-14 | Stop reason: HOSPADM

## 2018-03-14 RX ORDER — AMOXICILLIN 250 MG
1 CAPSULE ORAL NIGHTLY
Status: DISCONTINUED | OUTPATIENT
Start: 2018-03-14 | End: 2018-03-14 | Stop reason: HOSPADM

## 2018-03-14 RX ORDER — BALANCED SALT SOLUTION 6.4; .75; .48; .3; 3.9; 1.7 MG/ML; MG/ML; MG/ML; MG/ML; MG/ML; MG/ML
SOLUTION OPHTHALMIC
Status: DISCONTINUED | OUTPATIENT
Start: 2018-03-14 | End: 2018-03-14 | Stop reason: HOSPADM

## 2018-03-14 RX ORDER — SODIUM CHLORIDE 9 MG/ML
1000 INJECTION, SOLUTION INTRAVENOUS ONCE
Status: COMPLETED | OUTPATIENT
Start: 2018-03-14 | End: 2018-03-14

## 2018-03-14 RX ORDER — TETRACAINE HYDROCHLORIDE 5 MG/ML
SOLUTION OPHTHALMIC
Status: DISCONTINUED
Start: 2018-03-14 | End: 2018-03-14 | Stop reason: HOSPADM

## 2018-03-14 RX ORDER — LIDOCAINE HYDROCHLORIDE 10 MG/ML
INJECTION, SOLUTION EPIDURAL; INFILTRATION; INTRACAUDAL; PERINEURAL
Status: DISCONTINUED
Start: 2018-03-14 | End: 2018-03-14 | Stop reason: HOSPADM

## 2018-03-14 RX ORDER — OXYCODONE HYDROCHLORIDE 5 MG/1
10 TABLET ORAL
Status: DISCONTINUED | OUTPATIENT
Start: 2018-03-14 | End: 2018-03-14 | Stop reason: HOSPADM

## 2018-03-14 RX ORDER — ACETAMINOPHEN 650 MG/1
650 SUPPOSITORY RECTAL EVERY 4 HOURS PRN
Status: DISCONTINUED | OUTPATIENT
Start: 2018-03-14 | End: 2018-03-14 | Stop reason: HOSPADM

## 2018-03-14 RX ORDER — SODIUM CHLORIDE 9 MG/ML
INJECTION, SOLUTION INTRAVENOUS CONTINUOUS
Status: DISCONTINUED | OUTPATIENT
Start: 2018-03-14 | End: 2018-03-14 | Stop reason: HOSPADM

## 2018-03-14 RX ORDER — POLYETHYLENE GLYCOL 3350 17 G/17G
1 POWDER, FOR SOLUTION ORAL 2 TIMES DAILY
Status: DISCONTINUED | OUTPATIENT
Start: 2018-03-14 | End: 2018-03-14 | Stop reason: HOSPADM

## 2018-03-14 RX ORDER — PHENYLEPHRINE HYDROCHLORIDE 25 MG/ML
SOLUTION/ DROPS OPHTHALMIC
Status: DISCONTINUED
Start: 2018-03-14 | End: 2018-03-14 | Stop reason: HOSPADM

## 2018-03-14 RX ORDER — BUPIVACAINE HYDROCHLORIDE AND EPINEPHRINE 5; 5 MG/ML; UG/ML
INJECTION, SOLUTION EPIDURAL; INTRACAUDAL; PERINEURAL
Status: DISCONTINUED | OUTPATIENT
Start: 2018-03-14 | End: 2018-03-14 | Stop reason: HOSPADM

## 2018-03-14 RX ORDER — OXYMETAZOLINE HYDROCHLORIDE 0.05 G/100ML
SPRAY NASAL
Status: DISCONTINUED | OUTPATIENT
Start: 2018-03-14 | End: 2018-03-14 | Stop reason: HOSPADM

## 2018-03-14 RX ORDER — LIDOCAINE HYDROCHLORIDE AND EPINEPHRINE 10; 10 MG/ML; UG/ML
INJECTION, SOLUTION INFILTRATION; PERINEURAL
Status: DISCONTINUED | OUTPATIENT
Start: 2018-03-14 | End: 2018-03-14 | Stop reason: HOSPADM

## 2018-03-14 RX ORDER — OXYCODONE HCL 5 MG/5 ML
SOLUTION, ORAL ORAL
Status: COMPLETED
Start: 2018-03-14 | End: 2018-03-14

## 2018-03-14 RX ORDER — ONDANSETRON 2 MG/ML
4 INJECTION INTRAMUSCULAR; INTRAVENOUS EVERY 4 HOURS PRN
Status: DISCONTINUED | OUTPATIENT
Start: 2018-03-14 | End: 2018-03-14 | Stop reason: HOSPADM

## 2018-03-14 RX ORDER — FAMOTIDINE 20 MG/1
20 TABLET, FILM COATED ORAL 2 TIMES DAILY
Status: DISCONTINUED | OUTPATIENT
Start: 2018-03-14 | End: 2018-03-14 | Stop reason: HOSPADM

## 2018-03-14 RX ORDER — EPINEPHRINE 1 MG/ML(1)
AMPUL (ML) INJECTION
Status: DISCONTINUED | OUTPATIENT
Start: 2018-03-14 | End: 2018-03-14 | Stop reason: HOSPADM

## 2018-03-14 RX ORDER — CEFAZOLIN SODIUM 2 G/100ML
2 INJECTION, SOLUTION INTRAVENOUS ONCE
Status: COMPLETED | OUTPATIENT
Start: 2018-03-14 | End: 2018-03-14

## 2018-03-14 RX ORDER — DOCUSATE SODIUM 100 MG/1
100 CAPSULE, LIQUID FILLED ORAL 2 TIMES DAILY
Status: DISCONTINUED | OUTPATIENT
Start: 2018-03-14 | End: 2018-03-14 | Stop reason: HOSPADM

## 2018-03-14 RX ORDER — OXYCODONE HYDROCHLORIDE 5 MG/1
5 TABLET ORAL
Status: DISCONTINUED | OUTPATIENT
Start: 2018-03-14 | End: 2018-03-14 | Stop reason: HOSPADM

## 2018-03-14 RX ORDER — NEOMYCIN SULFATE, POLYMYXIN B SULFATE, AND DEXAMETHASONE 3.5; 10000; 1 MG/G; [USP'U]/G; MG/G
OINTMENT OPHTHALMIC
Status: DISCONTINUED | OUTPATIENT
Start: 2018-03-14 | End: 2018-03-14 | Stop reason: HOSPADM

## 2018-03-14 RX ORDER — BUPIVACAINE HYDROCHLORIDE AND EPINEPHRINE 5; 5 MG/ML; UG/ML
INJECTION, SOLUTION EPIDURAL; INTRACAUDAL; PERINEURAL
Status: DISCONTINUED
Start: 2018-03-14 | End: 2018-03-14 | Stop reason: HOSPADM

## 2018-03-14 RX ORDER — CHLORHEXIDINE GLUCONATE ORAL RINSE 1.2 MG/ML
15 SOLUTION DENTAL EVERY 12 HOURS
Status: DISCONTINUED | OUTPATIENT
Start: 2018-03-14 | End: 2018-03-14

## 2018-03-14 RX ORDER — SODIUM CHLORIDE, SODIUM LACTATE, POTASSIUM CHLORIDE, CALCIUM CHLORIDE 600; 310; 30; 20 MG/100ML; MG/100ML; MG/100ML; MG/100ML
INJECTION, SOLUTION INTRAVENOUS CONTINUOUS
Status: DISCONTINUED | OUTPATIENT
Start: 2018-03-14 | End: 2018-03-14 | Stop reason: HOSPADM

## 2018-03-14 RX ORDER — AMOXICILLIN 250 MG
1 CAPSULE ORAL
Status: DISCONTINUED | OUTPATIENT
Start: 2018-03-14 | End: 2018-03-14 | Stop reason: HOSPADM

## 2018-03-14 RX ORDER — OXYMETAZOLINE HYDROCHLORIDE 0.05 G/100ML
SPRAY NASAL
Status: DISCONTINUED
Start: 2018-03-14 | End: 2018-03-14 | Stop reason: HOSPADM

## 2018-03-14 RX ORDER — ENEMA 19; 7 G/133ML; G/133ML
1 ENEMA RECTAL
Status: DISCONTINUED | OUTPATIENT
Start: 2018-03-14 | End: 2018-03-14 | Stop reason: HOSPADM

## 2018-03-14 RX ORDER — BISACODYL 10 MG
10 SUPPOSITORY, RECTAL RECTAL
Status: DISCONTINUED | OUTPATIENT
Start: 2018-03-14 | End: 2018-03-14 | Stop reason: HOSPADM

## 2018-03-14 RX ADMIN — MORPHINE SULFATE 4 MG: 4 INJECTION INTRAVENOUS at 08:50

## 2018-03-14 RX ADMIN — OXYCODONE HYDROCHLORIDE 10 MG: 5 SOLUTION ORAL at 14:35

## 2018-03-14 RX ADMIN — CEFAZOLIN SODIUM 2 G: 2 INJECTION, SOLUTION INTRAVENOUS at 05:57

## 2018-03-14 RX ADMIN — ONDANSETRON HYDROCHLORIDE 4 MG: 2 INJECTION, SOLUTION INTRAMUSCULAR; INTRAVENOUS at 08:59

## 2018-03-14 RX ADMIN — SODIUM CHLORIDE 1000 ML: 9 INJECTION, SOLUTION INTRAVENOUS at 05:57

## 2018-03-14 RX ADMIN — FENTANYL CITRATE 50 MCG: 50 INJECTION INTRAMUSCULAR; INTRAVENOUS at 06:00

## 2018-03-14 ASSESSMENT — ENCOUNTER SYMPTOMS
NECK PAIN: 0
NAUSEA: 0
BACK PAIN: 0
SHORTNESS OF BREATH: 0
DIZZINESS: 0
FLANK PAIN: 0
SORE THROAT: 0
BLURRED VISION: 0
HEADACHES: 0
WEAKNESS: 0
ABDOMINAL PAIN: 0
VOMITING: 0

## 2018-03-14 ASSESSMENT — PAIN SCALES - GENERAL
PAINLEVEL_OUTOF10: 7
PAINLEVEL_OUTOF10: 0

## 2018-03-14 NOTE — DISCHARGE INSTRUCTIONS
ACTIVITY: Rest and take it easy for the first 24 hours.  A responsible adult is recommended to remain with you during that time.  It is normal to feel sleepy.  We encourage you to not do anything that requires balance, judgment or coordination.    MILD FLU-LIKE SYMPTOMS ARE NORMAL. YOU MAY EXPERIENCE GENERALIZED MUSCLE ACHES, THROAT IRRITATION, HEADACHE AND/OR SOME NAUSEA.    FOR 24 HOURS DO NOT:  Drive, operate machinery or run household appliances.  Drink beer or alcoholic beverages.   Make important decisions or sign legal documents.    SPECIAL INSTRUCTIONS: Ice pack to eye for first 24-48 hours for 20 minutes at a time.     DIET: To avoid nausea, slowly advance diet as tolerated, avoiding spicy or greasy foods for the first day.  Add more substantial food to your diet according to your physician's instructions.  Babies can be fed formula or breast milk as soon as they are hungry.  INCREASE FLUIDS AND FIBER TO AVOID CONSTIPATION.    SURGICAL DRESSING/BATHING: Keep clean and dry.     FOLLOW-UP APPOINTMENT:  A follow-up appointment should be arranged with your doctor; call to schedule.    You should CALL YOUR PHYSICIAN if you develop:  Fever greater than 101 degrees F.  Pain not relieved by medication, or persistent nausea or vomiting.  Excessive bleeding (blood soaking through dressing) or unexpected drainage from the wound.  Extreme redness or swelling around the incision site, drainage of pus or foul smelling drainage.  Inability to urinate or empty your bladder within 8 hours.  Problems with breathing or chest pain.    You should call [phone] if you develop problems with breathing or chest pain.  If you are unable to contact your doctor or surgical center, you should go to the nearest emergency room or urgent care center.  Physician's telephone #: [phone].    If any questions arise, call your doctor.  If your doctor is not available, please feel free to call the Surgical Center at [phone].  The Center  is open Monday through Friday from 7AM to 7PM.  You can also call the HEALTH HOTLINE open 24 hours/day, 7 days/week and speak to a nurse at [phone], or toll free at [phone].    A registered nurse may call you a few days after your surgery to see how you are doing after your procedure.    MEDICATIONS: Resume taking daily medication.  Take prescribed pain medication with food.  If no medication is prescribed, you may take non-aspirin pain medication if needed.  PAIN MEDICATION CAN BE VERY CONSTIPATING.  Take a stool softener or laxative such as senokot, pericolace, or milk of magnesia if needed.    Prescription given for Norco, cephalexin, zofran.  Last pain medication given at 2:35pm (10mg oxycodone).    If your physician has prescribed pain medication that includes Acetaminophen (Tylenol), do not take additional Acetaminophen (Tylenol) while taking the prescribed medication.    Depression / Suicide Risk    As you are discharged from this [facility] facility, it is important to learn how to keep safe from harming yourself.    Recognize the warning signs:  · Abrupt changes in personality, positive or negative- including increase in energy   · Giving away possessions  · Change in eating patterns- significant weight changes-  positive or negative  · Change in sleeping patterns- unable to sleep or sleeping all the time   · Unwillingness or inability to communicate  · Depression  · Unusual sadness, discouragement and loneliness  · Talk of wanting to die  · Neglect of personal appearance   · Rebelliousness- reckless behavior  · Withdrawal from people/activities they love  · Confusion- inability to concentrate     If you or a loved one observes any of these behaviors or has concerns about self-harm, here's what you can do:  · Talk about it- your feelings and reasons for harming yourself  · Remove any means that you might use to hurt yourself (examples: pills, rope, extension cords, firearm)  · Get  professional help from the community (Mental Health, Substance Abuse, psychological counseling)  · Do not be alone:Call your Safe Contact- someone whom you trust who will be there for you.  · Call your local CRISIS HOTLINE 313-2399 or 574-989-1869  · Call your local Children's Mobile Crisis Response Team [place] (893) 572-0072 or www.[employer]  · Call the toll free National Suicide Prevention Hotlines   · National Suicide Prevention Lifeline 011-606-UMFG (6601)  · National Hope Line Network 800-SUICIDE (817-6476)

## 2018-03-14 NOTE — ED NOTES
Med rec updated and complete  Allergies reviewed  Pt reports no prescription medications, OTC'S, or vitamins.  Pt reports no antibiotics in the last 30 days.
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
## 2018-03-14 NOTE — PROGRESS NOTES
"Blood pressure 124/72, pulse 91, temperature 37.2 °C (98.9 °F), resp. rate (!) 23, height 1.702 m (5' 7\"), weight 99.8 kg (220 lb), SpO2 99 %.      Pt in recovery awake and alert.  Dr. Avalos has cleared pt for discharge to home with family.  Pt will follow up with Dr. Avalos as directed .  Pt will remain in PACU for standard time and nursing will call if any needs arise.   [phone] (Jeevan)     Tertiary surveyc completed with no further findings.   SBIRT completed, pt now has a DUI and will be required to complete substance abuse course.       "
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
## 2018-03-14 NOTE — PROGRESS NOTES
1350-Handoff from Vicky RAGLAND. VSS. Pt resting on left side sleeping, respirations unlabored and even. Right periorbital edema and bruising. Eye covered in ointment from OR.     1400-Pt will be discharged from PACU per Dr. Avalos. Pt denies pain. VSS.    1410-Nurse practitioner Jeevan for trauma wants to see pt before discharging him home. Pt's wife brought to bedside.     1416-Jeevan NP at bedside discussing POC with pt and his wife. Pt will be discharging home today.  Both eyes tracking.     1435-Pt medicated per mar prophylactically for pain. Pt received local anesthetic in OR. Pt given cold pack for eye and instructed to not apply any pressure.     1455-DC instructions reviewed with pt and his wife who verbalize understanding.     1544-Pt dressed, 2/10 tolerable pain, says he feels ready to DC home. Wife has written prescriptions from Dr. Avalos. Pt up to bathroom.     1547-Pt discharged home with wife, steady gait.
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
## 2018-07-16 ENCOUNTER — OFFICE VISIT (OUTPATIENT)
Dept: URGENT CARE | Facility: PHYSICIAN GROUP | Age: 38
End: 2018-07-16
Payer: COMMERCIAL

## 2018-07-16 ENCOUNTER — APPOINTMENT (OUTPATIENT)
Dept: RADIOLOGY | Facility: IMAGING CENTER | Age: 38
End: 2018-07-16
Attending: PHYSICIAN ASSISTANT
Payer: COMMERCIAL

## 2018-07-16 VITALS
HEIGHT: 67 IN | HEART RATE: 80 BPM | BODY MASS INDEX: 34.06 KG/M2 | RESPIRATION RATE: 16 BRPM | OXYGEN SATURATION: 96 % | WEIGHT: 217 LBS | TEMPERATURE: 98.9 F | DIASTOLIC BLOOD PRESSURE: 74 MMHG | SYSTOLIC BLOOD PRESSURE: 122 MMHG

## 2018-07-16 DIAGNOSIS — R06.02 SHORTNESS OF BREATH: ICD-10-CM

## 2018-07-16 PROCEDURE — 71046 X-RAY EXAM CHEST 2 VIEWS: CPT | Mod: TC,FY | Performed by: PHYSICIAN ASSISTANT

## 2018-07-16 PROCEDURE — 99214 OFFICE O/P EST MOD 30 MIN: CPT | Performed by: PHYSICIAN ASSISTANT

## 2018-07-16 RX ORDER — IPRATROPIUM BROMIDE AND ALBUTEROL SULFATE 2.5; .5 MG/3ML; MG/3ML
3 SOLUTION RESPIRATORY (INHALATION) ONCE
Status: COMPLETED | OUTPATIENT
Start: 2018-07-16 | End: 2018-07-16

## 2018-07-16 RX ORDER — ALBUTEROL SULFATE 90 UG/1
2 AEROSOL, METERED RESPIRATORY (INHALATION) EVERY 6 HOURS PRN
Qty: 8.5 G | Refills: 0 | Status: SHIPPED | OUTPATIENT
Start: 2018-07-16 | End: 2019-10-26

## 2018-07-16 RX ADMIN — IPRATROPIUM BROMIDE AND ALBUTEROL SULFATE 3 ML: 2.5; .5 SOLUTION RESPIRATORY (INHALATION) at 19:30

## 2018-07-17 ASSESSMENT — ENCOUNTER SYMPTOMS
HEMOPTYSIS: 0
SHORTNESS OF BREATH: 1
CHILLS: 0
SPUTUM PRODUCTION: 0
COUGH: 0
VOMITING: 0
PALPITATIONS: 0
ABDOMINAL PAIN: 0
NAUSEA: 0
DIZZINESS: 0
DIARRHEA: 0
SWOLLEN GLANDS: 0
ORTHOPNEA: 0
FEVER: 0
MUSCULOSKELETAL NEGATIVE: 1
WHEEZING: 0

## 2018-07-17 ASSESSMENT — COPD QUESTIONNAIRES: COPD: 0

## 2018-07-17 NOTE — PROGRESS NOTES
"Subjective:      Bharath Diaz is a 37 y.o. male who presents with Shortness of Breath (Pt states he feels like he cant get a full breath)        Patient is accompanied by his wife.     Shortness of Breath   This is a new problem. The current episode started 1 to 4 weeks ago (1 week). The problem occurs constantly. The problem has been waxing and waning. Pertinent negatives include no abdominal pain, chest pain, fever, hemoptysis, leg swelling, orthopnea, rash, sputum production, swollen glands, vomiting or wheezing. Nothing aggravates the symptoms. He has tried OTC cough suppressants for the symptoms. The treatment provided mild relief. There is no history of chronic lung disease, COPD, DVT, pneumonia or a recent surgery.     Patient presents to urgent care reporting a 1 week history of shortness of breath and feelings as if he can't take a full breath. Symptoms are exacerbated by exertion or deep inhalation. No fevers, chills, body aches, chest pain, wheezing, cough, congestion, or sore throat. No history of asthma or pneumonia. He denies recent surgeries, prolonged periods of immobilization, lower extremity edema, or history of blood clots.     Review of Systems   Constitutional: Negative for chills and fever.   HENT: Negative for congestion.    Respiratory: Positive for shortness of breath. Negative for cough, hemoptysis, sputum production and wheezing.    Cardiovascular: Negative for chest pain, palpitations, orthopnea and leg swelling.   Gastrointestinal: Negative for abdominal pain, diarrhea, nausea and vomiting.   Genitourinary: Negative.    Musculoskeletal: Negative.    Skin: Negative for rash.   Neurological: Negative for dizziness.        Objective:     /74   Pulse 80   Temp 37.2 °C (98.9 °F)   Resp 16   Ht 1.702 m (5' 7\")   Wt 98.4 kg (217 lb)   SpO2 96%   BMI 33.99 kg/m²      Physical Exam   Constitutional: He is oriented to person, place, and time. He appears well-developed and " well-nourished. No distress.   HENT:   Head: Normocephalic and atraumatic.   Eyes: Pupils are equal, round, and reactive to light.   Neck: Normal range of motion.   Cardiovascular: Normal rate, regular rhythm and normal heart sounds.  Exam reveals no friction rub.    No murmur heard.  Pulmonary/Chest: Effort normal and breath sounds normal. No respiratory distress. He has no wheezes. He has no rales.   Musculoskeletal: Normal range of motion.   No lower extremity edema bilaterally   Neurological: He is alert and oriented to person, place, and time.   Skin: Skin is warm and dry. He is not diaphoretic.   Psychiatric: He has a normal mood and affect. His behavior is normal.   Nursing note and vitals reviewed.         PMH:  has no past medical history on file.  MEDS:   Current Outpatient Prescriptions:   •  albuterol 108 (90 Base) MCG/ACT Aero Soln inhalation aerosol, Inhale 2 Puffs by mouth every 6 hours as needed for Shortness of Breath., Disp: 8.5 g, Rfl: 0  ALLERGIES: No Known Allergies  SURGHX:   Past Surgical History:   Procedure Laterality Date   • EYE LACERATION REPAIR Right 3/14/2018    Procedure: EYE LACERATION REPAIR-EYE LID REPAIR;  Surgeon: Mariann Avalos M.D.;  Location: SURGERY SAME DAY [facility];  Service: Ent     SOCHX:  reports that he has been smoking Cigars.  He has been smoking about 0.00 packs per day for the past 18.00 years. He has never used smokeless tobacco. He reports that he drinks alcohol. He reports that he uses drugs, including Inhaled and Marijuana.  FH: family history includes Thyroid in his mother.    Assessment/Plan:     1. Shortness of breath  - DX-CHEST-2 VIEWS; Future  Impression         1. No active cardiopulmonary abnormalities are identified.     report per radiology.  I have reviewed the films and agree with the reading    - ipratropium-albuterol (DUONEB) nebulizer solution; 3 mL by Nebulization route Once.   - Given in clinic with moderate relief of symptoms, pulse ox  improved to 100% after breathing treatment  - albuterol 108 (90 Base) MCG/ACT Aero Soln inhalation aerosol; Inhale 2 Puffs by mouth every 6 hours as needed for Shortness of Breath.  Dispense: 8.5 g; Refill: 0    Albuterol inhaler given to use as needed for symptomatic relief. Encouraged smoking cessation. Monitor symptoms closely and RTC or follow up with PCP if symptoms persist/worsen. The patient demonstrated a good understanding and agreed with the treatment plan.

## 2018-09-07 ENCOUNTER — HOSPITAL ENCOUNTER (OUTPATIENT)
Facility: MEDICAL CENTER | Age: 38
End: 2018-09-08
Attending: EMERGENCY MEDICINE | Admitting: HOSPITALIST
Payer: COMMERCIAL

## 2018-09-07 ENCOUNTER — OFFICE VISIT (OUTPATIENT)
Dept: URGENT CARE | Facility: CLINIC | Age: 38
End: 2018-09-07
Payer: COMMERCIAL

## 2018-09-07 ENCOUNTER — APPOINTMENT (OUTPATIENT)
Dept: RADIOLOGY | Facility: MEDICAL CENTER | Age: 38
End: 2018-09-07
Attending: EMERGENCY MEDICINE
Payer: COMMERCIAL

## 2018-09-07 VITALS
TEMPERATURE: 97.9 F | OXYGEN SATURATION: 98 % | HEART RATE: 82 BPM | HEIGHT: 67 IN | DIASTOLIC BLOOD PRESSURE: 78 MMHG | SYSTOLIC BLOOD PRESSURE: 122 MMHG | BODY MASS INDEX: 34.06 KG/M2 | WEIGHT: 217 LBS | RESPIRATION RATE: 16 BRPM

## 2018-09-07 DIAGNOSIS — R42 DIZZINESS: ICD-10-CM

## 2018-09-07 DIAGNOSIS — R68.84 JAW PAIN: ICD-10-CM

## 2018-09-07 DIAGNOSIS — R07.9 CHEST PAIN, UNSPECIFIED TYPE: ICD-10-CM

## 2018-09-07 DIAGNOSIS — R07.9 CHEST PAIN, UNSPECIFIED TYPE: Primary | ICD-10-CM

## 2018-09-07 PROBLEM — J45.909 ASTHMA: Status: ACTIVE | Noted: 2018-09-07

## 2018-09-07 PROBLEM — F10.10 ALCOHOL ABUSE: Status: ACTIVE | Noted: 2018-09-07

## 2018-09-07 PROBLEM — Z72.0 TOBACCO ABUSE: Status: ACTIVE | Noted: 2018-09-07

## 2018-09-07 PROBLEM — E66.9 OBESITY (BMI 35.0-39.9 WITHOUT COMORBIDITY): Status: ACTIVE | Noted: 2018-09-07

## 2018-09-07 PROBLEM — E78.5 DYSLIPIDEMIA: Status: ACTIVE | Noted: 2018-09-07

## 2018-09-07 LAB
ALBUMIN SERPL BCP-MCNC: 4 G/DL (ref 3.2–4.9)
ALBUMIN/GLOB SERPL: 1.3 G/DL
ALP SERPL-CCNC: 38 U/L (ref 30–99)
ALT SERPL-CCNC: 14 U/L (ref 2–50)
ANION GAP SERPL CALC-SCNC: 7 MMOL/L (ref 0–11.9)
APTT PPP: 27.9 SEC (ref 24.7–36)
AST SERPL-CCNC: 13 U/L (ref 12–45)
BASOPHILS # BLD AUTO: 0.9 % (ref 0–1.8)
BASOPHILS # BLD: 0.07 K/UL (ref 0–0.12)
BILIRUB SERPL-MCNC: 0.5 MG/DL (ref 0.1–1.5)
BNP SERPL-MCNC: 5 PG/ML (ref 0–100)
BUN SERPL-MCNC: 18 MG/DL (ref 8–22)
CALCIUM SERPL-MCNC: 9 MG/DL (ref 8.5–10.5)
CHLORIDE SERPL-SCNC: 106 MMOL/L (ref 96–112)
CO2 SERPL-SCNC: 24 MMOL/L (ref 20–33)
CREAT SERPL-MCNC: 0.85 MG/DL (ref 0.5–1.4)
EKG IMPRESSION: NORMAL
EOSINOPHIL # BLD AUTO: 0.11 K/UL (ref 0–0.51)
EOSINOPHIL NFR BLD: 1.4 % (ref 0–6.9)
ERYTHROCYTE [DISTWIDTH] IN BLOOD BY AUTOMATED COUNT: 45.8 FL (ref 35.9–50)
GLOBULIN SER CALC-MCNC: 3 G/DL (ref 1.9–3.5)
GLUCOSE SERPL-MCNC: 95 MG/DL (ref 65–99)
HCT VFR BLD AUTO: 42.3 % (ref 42–52)
HGB BLD-MCNC: 13.8 G/DL (ref 14–18)
IMM GRANULOCYTES # BLD AUTO: 0.01 K/UL (ref 0–0.11)
IMM GRANULOCYTES NFR BLD AUTO: 0.1 % (ref 0–0.9)
INR PPP: 0.95 (ref 0.87–1.13)
LIPASE SERPL-CCNC: 9 U/L (ref 11–82)
LYMPHOCYTES # BLD AUTO: 2.71 K/UL (ref 1–4.8)
LYMPHOCYTES NFR BLD: 35 % (ref 22–41)
MCH RBC QN AUTO: 29.7 PG (ref 27–33)
MCHC RBC AUTO-ENTMCNC: 32.6 G/DL (ref 33.7–35.3)
MCV RBC AUTO: 91.2 FL (ref 81.4–97.8)
MONOCYTES # BLD AUTO: 0.8 K/UL (ref 0–0.85)
MONOCYTES NFR BLD AUTO: 10.3 % (ref 0–13.4)
NEUTROPHILS # BLD AUTO: 4.05 K/UL (ref 1.82–7.42)
NEUTROPHILS NFR BLD: 52.3 % (ref 44–72)
NRBC # BLD AUTO: 0 K/UL
NRBC BLD-RTO: 0 /100 WBC
PLATELET # BLD AUTO: 229 K/UL (ref 164–446)
PMV BLD AUTO: 10.2 FL (ref 9–12.9)
POTASSIUM SERPL-SCNC: 4 MMOL/L (ref 3.6–5.5)
PROT SERPL-MCNC: 7 G/DL (ref 6–8.2)
PROTHROMBIN TIME: 12.4 SEC (ref 12–14.6)
RBC # BLD AUTO: 4.64 M/UL (ref 4.7–6.1)
SODIUM SERPL-SCNC: 137 MMOL/L (ref 135–145)
TROPONIN I SERPL-MCNC: <0.01 NG/ML (ref 0–0.04)
WBC # BLD AUTO: 7.8 K/UL (ref 4.8–10.8)

## 2018-09-07 PROCEDURE — 93000 ELECTROCARDIOGRAM COMPLETE: CPT | Performed by: PHYSICIAN ASSISTANT

## 2018-09-07 PROCEDURE — 83880 ASSAY OF NATRIURETIC PEPTIDE: CPT

## 2018-09-07 PROCEDURE — G0378 HOSPITAL OBSERVATION PER HR: HCPCS

## 2018-09-07 PROCEDURE — 700111 HCHG RX REV CODE 636 W/ 250 OVERRIDE (IP): Performed by: HOSPITALIST

## 2018-09-07 PROCEDURE — 93005 ELECTROCARDIOGRAM TRACING: CPT

## 2018-09-07 PROCEDURE — 85730 THROMBOPLASTIN TIME PARTIAL: CPT

## 2018-09-07 PROCEDURE — 84484 ASSAY OF TROPONIN QUANT: CPT

## 2018-09-07 PROCEDURE — 83690 ASSAY OF LIPASE: CPT

## 2018-09-07 PROCEDURE — 96372 THER/PROPH/DIAG INJ SC/IM: CPT

## 2018-09-07 PROCEDURE — 71045 X-RAY EXAM CHEST 1 VIEW: CPT

## 2018-09-07 PROCEDURE — 85025 COMPLETE CBC W/AUTO DIFF WBC: CPT

## 2018-09-07 PROCEDURE — 99220 PR INITIAL OBSERVATION CARE,LEVL III: CPT | Performed by: HOSPITALIST

## 2018-09-07 PROCEDURE — 99215 OFFICE O/P EST HI 40 MIN: CPT | Performed by: PHYSICIAN ASSISTANT

## 2018-09-07 PROCEDURE — 36415 COLL VENOUS BLD VENIPUNCTURE: CPT

## 2018-09-07 PROCEDURE — 80053 COMPREHEN METABOLIC PANEL: CPT

## 2018-09-07 PROCEDURE — 94760 N-INVAS EAR/PLS OXIMETRY 1: CPT

## 2018-09-07 PROCEDURE — 85610 PROTHROMBIN TIME: CPT

## 2018-09-07 PROCEDURE — 99285 EMERGENCY DEPT VISIT HI MDM: CPT

## 2018-09-07 PROCEDURE — 93005 ELECTROCARDIOGRAM TRACING: CPT | Performed by: EMERGENCY MEDICINE

## 2018-09-07 RX ORDER — ASPIRIN 81 MG/1
324 TABLET, CHEWABLE ORAL ONCE
Status: DISCONTINUED | OUTPATIENT
Start: 2018-09-07 | End: 2018-09-07

## 2018-09-07 RX ORDER — PROMETHAZINE HYDROCHLORIDE 25 MG/1
12.5-25 TABLET ORAL EVERY 4 HOURS PRN
Status: DISCONTINUED | OUTPATIENT
Start: 2018-09-07 | End: 2018-09-08 | Stop reason: HOSPADM

## 2018-09-07 RX ORDER — ONDANSETRON 2 MG/ML
4 INJECTION INTRAMUSCULAR; INTRAVENOUS EVERY 4 HOURS PRN
Status: DISCONTINUED | OUTPATIENT
Start: 2018-09-07 | End: 2018-09-08 | Stop reason: HOSPADM

## 2018-09-07 RX ORDER — POLYETHYLENE GLYCOL 3350 17 G/17G
1 POWDER, FOR SOLUTION ORAL
Status: DISCONTINUED | OUTPATIENT
Start: 2018-09-07 | End: 2018-09-08 | Stop reason: HOSPADM

## 2018-09-07 RX ORDER — AMOXICILLIN 250 MG
2 CAPSULE ORAL 2 TIMES DAILY
Status: DISCONTINUED | OUTPATIENT
Start: 2018-09-07 | End: 2018-09-08 | Stop reason: HOSPADM

## 2018-09-07 RX ORDER — ASPIRIN 81 MG/1
324 TABLET, CHEWABLE ORAL DAILY
Status: DISCONTINUED | OUTPATIENT
Start: 2018-09-08 | End: 2018-09-08 | Stop reason: HOSPADM

## 2018-09-07 RX ORDER — PROMETHAZINE HYDROCHLORIDE 25 MG/1
12.5-25 SUPPOSITORY RECTAL EVERY 4 HOURS PRN
Status: DISCONTINUED | OUTPATIENT
Start: 2018-09-07 | End: 2018-09-08 | Stop reason: HOSPADM

## 2018-09-07 RX ORDER — ASPIRIN 300 MG/1
300 SUPPOSITORY RECTAL DAILY
Status: DISCONTINUED | OUTPATIENT
Start: 2018-09-08 | End: 2018-09-08 | Stop reason: HOSPADM

## 2018-09-07 RX ORDER — OXYCODONE HYDROCHLORIDE 10 MG/1
10 TABLET ORAL
Status: DISCONTINUED | OUTPATIENT
Start: 2018-09-07 | End: 2018-09-08 | Stop reason: HOSPADM

## 2018-09-07 RX ORDER — OXYCODONE HYDROCHLORIDE 5 MG/1
5 TABLET ORAL
Status: DISCONTINUED | OUTPATIENT
Start: 2018-09-07 | End: 2018-09-08 | Stop reason: HOSPADM

## 2018-09-07 RX ORDER — BISACODYL 10 MG
10 SUPPOSITORY, RECTAL RECTAL
Status: DISCONTINUED | OUTPATIENT
Start: 2018-09-07 | End: 2018-09-08 | Stop reason: HOSPADM

## 2018-09-07 RX ORDER — HEPARIN SODIUM 5000 [USP'U]/ML
5000 INJECTION, SOLUTION INTRAVENOUS; SUBCUTANEOUS EVERY 8 HOURS
Status: DISCONTINUED | OUTPATIENT
Start: 2018-09-07 | End: 2018-09-08 | Stop reason: HOSPADM

## 2018-09-07 RX ORDER — ASPIRIN 325 MG
325 TABLET ORAL DAILY
Status: DISCONTINUED | OUTPATIENT
Start: 2018-09-08 | End: 2018-09-08 | Stop reason: HOSPADM

## 2018-09-07 RX ORDER — HYDROMORPHONE HYDROCHLORIDE 2 MG/ML
0.5 INJECTION, SOLUTION INTRAMUSCULAR; INTRAVENOUS; SUBCUTANEOUS
Status: DISCONTINUED | OUTPATIENT
Start: 2018-09-07 | End: 2018-09-08 | Stop reason: HOSPADM

## 2018-09-07 RX ORDER — ONDANSETRON 4 MG/1
4 TABLET, ORALLY DISINTEGRATING ORAL EVERY 4 HOURS PRN
Status: DISCONTINUED | OUTPATIENT
Start: 2018-09-07 | End: 2018-09-08 | Stop reason: HOSPADM

## 2018-09-07 RX ADMIN — HEPARIN SODIUM 5000 UNITS: 5000 INJECTION, SOLUTION INTRAVENOUS; SUBCUTANEOUS at 23:07

## 2018-09-07 RX ADMIN — ASPIRIN 324 MG: 81 TABLET, CHEWABLE ORAL at 18:39

## 2018-09-07 ASSESSMENT — LIFESTYLE VARIABLES
HAVE YOU EVER FELT YOU SHOULD CUT DOWN ON YOUR DRINKING: NO
HAVE PEOPLE ANNOYED YOU BY CRITICIZING YOUR DRINKING: NO
HOW MANY TIMES IN THE PAST YEAR HAVE YOU HAD 5 OR MORE DRINKS IN A DAY: 0
TOTAL SCORE: 0
ON A TYPICAL DAY WHEN YOU DRINK ALCOHOL HOW MANY DRINKS DO YOU HAVE: 0
TOTAL SCORE: 0
EVER HAD A DRINK FIRST THING IN THE MORNING TO STEADY YOUR NERVES TO GET RID OF A HANGOVER: NO
ALCOHOL_USE: YES
AVERAGE NUMBER OF DAYS PER WEEK YOU HAVE A DRINK CONTAINING ALCOHOL: 0
CONSUMPTION TOTAL: NEGATIVE
EVER_SMOKED: YES
TOTAL SCORE: 0
EVER FELT BAD OR GUILTY ABOUT YOUR DRINKING: NO

## 2018-09-07 ASSESSMENT — PATIENT HEALTH QUESTIONNAIRE - PHQ9
1. LITTLE INTEREST OR PLEASURE IN DOING THINGS: NOT AT ALL
SUM OF ALL RESPONSES TO PHQ9 QUESTIONS 1 AND 2: 0
2. FEELING DOWN, DEPRESSED, IRRITABLE, OR HOPELESS: NOT AT ALL

## 2018-09-07 ASSESSMENT — COPD QUESTIONNAIRES
DO YOU EVER COUGH UP ANY MUCUS OR PHLEGM?: NO/ONLY WITH OCCASIONAL COLDS OR INFECTIONS
DURING THE PAST 4 WEEKS HOW MUCH DID YOU FEEL SHORT OF BREATH: NONE/LITTLE OF THE TIME
COPD SCREENING SCORE: 2
HAVE YOU SMOKED AT LEAST 100 CIGARETTES IN YOUR ENTIRE LIFE: YES

## 2018-09-07 ASSESSMENT — PAIN SCALES - GENERAL
PAINLEVEL_OUTOF10: 3
PAINLEVEL_OUTOF10: 4

## 2018-09-08 ENCOUNTER — PATIENT OUTREACH (OUTPATIENT)
Dept: HEALTH INFORMATION MANAGEMENT | Facility: OTHER | Age: 38
End: 2018-09-08

## 2018-09-08 ENCOUNTER — APPOINTMENT (OUTPATIENT)
Dept: RADIOLOGY | Facility: MEDICAL CENTER | Age: 38
End: 2018-09-08
Attending: HOSPITALIST
Payer: COMMERCIAL

## 2018-09-08 VITALS
RESPIRATION RATE: 19 BRPM | BODY MASS INDEX: 34.55 KG/M2 | HEART RATE: 46 BPM | TEMPERATURE: 98.1 F | DIASTOLIC BLOOD PRESSURE: 56 MMHG | HEIGHT: 68 IN | WEIGHT: 227.96 LBS | OXYGEN SATURATION: 95 % | SYSTOLIC BLOOD PRESSURE: 101 MMHG

## 2018-09-08 LAB
ALBUMIN SERPL BCP-MCNC: 3.3 G/DL (ref 3.2–4.9)
ALBUMIN/GLOB SERPL: 1.3 G/DL
ALP SERPL-CCNC: 32 U/L (ref 30–99)
ALT SERPL-CCNC: 12 U/L (ref 2–50)
ANION GAP SERPL CALC-SCNC: 6 MMOL/L (ref 0–11.9)
ANION GAP SERPL CALC-SCNC: 9 MMOL/L (ref 0–11.9)
AST SERPL-CCNC: 11 U/L (ref 12–45)
BASOPHILS # BLD AUTO: 0.7 % (ref 0–1.8)
BASOPHILS # BLD: 0.06 K/UL (ref 0–0.12)
BILIRUB SERPL-MCNC: 0.4 MG/DL (ref 0.1–1.5)
BUN SERPL-MCNC: 15 MG/DL (ref 8–22)
BUN SERPL-MCNC: 18 MG/DL (ref 8–22)
CALCIUM SERPL-MCNC: 8.1 MG/DL (ref 8.5–10.5)
CALCIUM SERPL-MCNC: 8.8 MG/DL (ref 8.5–10.5)
CHLORIDE SERPL-SCNC: 108 MMOL/L (ref 96–112)
CHLORIDE SERPL-SCNC: 111 MMOL/L (ref 96–112)
CHOLEST SERPL-MCNC: 184 MG/DL (ref 100–199)
CO2 SERPL-SCNC: 22 MMOL/L (ref 20–33)
CO2 SERPL-SCNC: 24 MMOL/L (ref 20–33)
CREAT SERPL-MCNC: 0.74 MG/DL (ref 0.5–1.4)
CREAT SERPL-MCNC: 0.88 MG/DL (ref 0.5–1.4)
EKG IMPRESSION: NORMAL
EOSINOPHIL # BLD AUTO: 0.13 K/UL (ref 0–0.51)
EOSINOPHIL NFR BLD: 1.6 % (ref 0–6.9)
ERYTHROCYTE [DISTWIDTH] IN BLOOD BY AUTOMATED COUNT: 46.6 FL (ref 35.9–50)
GLOBULIN SER CALC-MCNC: 2.5 G/DL (ref 1.9–3.5)
GLUCOSE SERPL-MCNC: 101 MG/DL (ref 65–99)
GLUCOSE SERPL-MCNC: 93 MG/DL (ref 65–99)
HCT VFR BLD AUTO: 40.4 % (ref 42–52)
HDLC SERPL-MCNC: 48 MG/DL
HGB BLD-MCNC: 13.1 G/DL (ref 14–18)
IMM GRANULOCYTES # BLD AUTO: 0.01 K/UL (ref 0–0.11)
IMM GRANULOCYTES NFR BLD AUTO: 0.1 % (ref 0–0.9)
LDLC SERPL CALC-MCNC: 94 MG/DL
LYMPHOCYTES # BLD AUTO: 2.85 K/UL (ref 1–4.8)
LYMPHOCYTES NFR BLD: 34.8 % (ref 22–41)
MCH RBC QN AUTO: 30 PG (ref 27–33)
MCHC RBC AUTO-ENTMCNC: 32.4 G/DL (ref 33.7–35.3)
MCV RBC AUTO: 92.4 FL (ref 81.4–97.8)
MONOCYTES # BLD AUTO: 0.94 K/UL (ref 0–0.85)
MONOCYTES NFR BLD AUTO: 11.5 % (ref 0–13.4)
NEUTROPHILS # BLD AUTO: 4.2 K/UL (ref 1.82–7.42)
NEUTROPHILS NFR BLD: 51.3 % (ref 44–72)
NRBC # BLD AUTO: 0 K/UL
NRBC BLD-RTO: 0 /100 WBC
PLATELET # BLD AUTO: 208 K/UL (ref 164–446)
PMV BLD AUTO: 10.3 FL (ref 9–12.9)
POTASSIUM SERPL-SCNC: 3.2 MMOL/L (ref 3.6–5.5)
POTASSIUM SERPL-SCNC: 4.1 MMOL/L (ref 3.6–5.5)
PROT SERPL-MCNC: 5.8 G/DL (ref 6–8.2)
RBC # BLD AUTO: 4.37 M/UL (ref 4.7–6.1)
SODIUM SERPL-SCNC: 138 MMOL/L (ref 135–145)
SODIUM SERPL-SCNC: 142 MMOL/L (ref 135–145)
TRIGL SERPL-MCNC: 211 MG/DL (ref 0–149)
TROPONIN I SERPL-MCNC: <0.01 NG/ML (ref 0–0.04)
TROPONIN I SERPL-MCNC: <0.01 NG/ML (ref 0–0.04)
WBC # BLD AUTO: 8.2 K/UL (ref 4.8–10.8)

## 2018-09-08 PROCEDURE — 96372 THER/PROPH/DIAG INJ SC/IM: CPT

## 2018-09-08 PROCEDURE — 700111 HCHG RX REV CODE 636 W/ 250 OVERRIDE (IP)

## 2018-09-08 PROCEDURE — 700102 HCHG RX REV CODE 250 W/ 637 OVERRIDE(OP): Performed by: HOSPITALIST

## 2018-09-08 PROCEDURE — 36415 COLL VENOUS BLD VENIPUNCTURE: CPT

## 2018-09-08 PROCEDURE — G0378 HOSPITAL OBSERVATION PER HR: HCPCS

## 2018-09-08 PROCEDURE — 700111 HCHG RX REV CODE 636 W/ 250 OVERRIDE (IP): Performed by: HOSPITALIST

## 2018-09-08 PROCEDURE — 93005 ELECTROCARDIOGRAM TRACING: CPT | Performed by: HOSPITALIST

## 2018-09-08 PROCEDURE — 85025 COMPLETE CBC W/AUTO DIFF WBC: CPT

## 2018-09-08 PROCEDURE — 80061 LIPID PANEL: CPT

## 2018-09-08 PROCEDURE — 80053 COMPREHEN METABOLIC PANEL: CPT

## 2018-09-08 PROCEDURE — 80048 BASIC METABOLIC PNL TOTAL CA: CPT

## 2018-09-08 PROCEDURE — 84484 ASSAY OF TROPONIN QUANT: CPT | Mod: 91

## 2018-09-08 PROCEDURE — 83036 HEMOGLOBIN GLYCOSYLATED A1C: CPT

## 2018-09-08 PROCEDURE — 93010 ELECTROCARDIOGRAM REPORT: CPT | Performed by: INTERNAL MEDICINE

## 2018-09-08 PROCEDURE — A9270 NON-COVERED ITEM OR SERVICE: HCPCS | Performed by: HOSPITALIST

## 2018-09-08 PROCEDURE — 700102 HCHG RX REV CODE 250 W/ 637 OVERRIDE(OP): Performed by: FAMILY MEDICINE

## 2018-09-08 PROCEDURE — 99217 PR OBSERVATION CARE DISCHARGE: CPT | Performed by: INTERNAL MEDICINE

## 2018-09-08 PROCEDURE — A9502 TC99M TETROFOSMIN: HCPCS

## 2018-09-08 PROCEDURE — A9270 NON-COVERED ITEM OR SERVICE: HCPCS | Performed by: FAMILY MEDICINE

## 2018-09-08 RX ORDER — POTASSIUM CHLORIDE 20 MEQ/1
40 TABLET, EXTENDED RELEASE ORAL ONCE
Status: DISCONTINUED | OUTPATIENT
Start: 2018-09-08 | End: 2018-09-08

## 2018-09-08 RX ORDER — REGADENOSON 0.08 MG/ML
INJECTION, SOLUTION INTRAVENOUS
Status: COMPLETED
Start: 2018-09-08 | End: 2018-09-08

## 2018-09-08 RX ORDER — POTASSIUM CHLORIDE 20 MEQ/1
40 TABLET, EXTENDED RELEASE ORAL ONCE
Status: COMPLETED | OUTPATIENT
Start: 2018-09-08 | End: 2018-09-08

## 2018-09-08 RX ADMIN — POTASSIUM CHLORIDE 40 MEQ: 1500 TABLET, EXTENDED RELEASE ORAL at 05:18

## 2018-09-08 RX ADMIN — ASPIRIN 325 MG: 325 TABLET, COATED ORAL at 05:18

## 2018-09-08 RX ADMIN — REGADENOSON 0.4 MG: 0.08 INJECTION, SOLUTION INTRAVENOUS at 08:41

## 2018-09-08 RX ADMIN — HEPARIN SODIUM 5000 UNITS: 5000 INJECTION, SOLUTION INTRAVENOUS; SUBCUTANEOUS at 05:18

## 2018-09-08 ASSESSMENT — PAIN SCALES - GENERAL: PAINLEVEL_OUTOF10: 0

## 2018-09-08 NOTE — ED TRIAGE NOTES
.  Chief Complaint   Patient presents with   • Chest Pain     0800 onset, constant worse when leaning over. radiates to neck.    • Dizziness   EKG complete prior to triage. Pt reports pain 4/10.   Denies sob.

## 2018-09-08 NOTE — ED PROVIDER NOTES
ED Provider Note    CHIEF COMPLAINT  Chief Complaint   Patient presents with   • Chest Pain     0800 onset, constant worse when leaning over. radiates to neck.    • Dizziness   • Sent from Urgent Care     Dr Hernandez       [facility]  Bharath Diaz is a 37 y.o. male who presents with chest pain.  Patient states the pain started this morning while at work.  He states initially the pain is worse when he was leaning forward.  He states now it is constant.  He states that substernal in location described as sharp and pressure.  He does have some associated dyspnea as well as dizziness.  He does not have any nausea nor diaphoresis.  He does have dyslipidemia as a cardiac risk factor but no other risk factors.  He denies stimulant abuse.  He does not have any pain or swelling to his lower extremities nor does he have any risk factors from a pulmonary embolus standpoint.  The patient states the pain is currently mild in intensity.  He was evaluated at the urgent care and received aspirin is transferred here for higher level of care.    REVIEW OF SYSTEMS  See HPI for further details. All other systems are negative.     PAST MEDICAL HISTORY  History reviewed. No pertinent past medical history.    SOCIAL HISTORY  Social History     Social History   • Marital status:      Spouse name: N/A   • Number of children: N/A   • Years of education: N/A     Social History Main Topics   • Smoking status: Current Every Day Smoker     Packs/day: 0.00     Years: 18.00     Types: Cigars   • Smokeless tobacco: Never Used      Comment: 1 cigar a day    • Alcohol use Yes      Comment: Today   • Drug use: Yes     Types: Inhaled, Marijuana      Comment: hasnt smoked in over one year   • Sexual activity: Not on file     Other Topics Concern   • Not on file     Social History Narrative    ** Merged History Encounter **                PHYSICAL EXAM  VITAL SIGNS: /80   Pulse 75   Temp 36.7 °C (98.1 °F)   Resp 16   Wt 104.7 kg (230 lb 13.2  oz)   SpO2 97%   BMI 36.15 kg/m²   Constitutional: Well developed, Well nourished, No acute distress, Non-toxic appearance.   HENT: Normocephalic, Atraumatic, tympanic membranes are intact and nonerythematous bilaterally, Oropharynx moist without exudates or erythema, Nose normal.   Eyes: PERRLA, EOMI, Conjunctiva normal.  Neck: Supple without meningismus  Lymphatic: No lymphadenopathy noted.   Cardiovascular: Normal heart rate, Normal rhythm, No murmurs, No rubs, No gallops.   Thorax & Lungs: Normal breath sounds, No respiratory distress, No wheezing, No chest tenderness.   Abdomen: Bowel sounds normal, Soft, No tenderness, no rebound, no guarding, no distention, No masses, No pulsatile masses.   Skin: Warm, Dry, No erythema, No rash.   Back: No tenderness, No CVA tenderness.   Extremities: Atraumatic with symmetric distal pulses, No edema, No tenderness, No cyanosis, No clubbing.   Neurologic: Alert & oriented x 3, cranial nerves II through XII are intact, Normal motor function, Normal sensory function, No focal deficits noted.   Psychiatric: Affect normal, Judgment normal, Mood normal.     EKG  Twelve-lead EKG interpreted by myself shows a normal sinus rhythm with a ventricular to 75, normal QRS, normal intervals, no ST segment elevation and meets criteria, he does have about a half a box of elevation in lead II and some very slight elevation in lead III with flat T waves in lead III.  Otherwise normal EKG.    RADIOLOGY/PROCEDURES  DX-CHEST-LIMITED (1 VIEW)   Final Result         1.  No acute cardiopulmonary disease.            COURSE & MEDICAL DECISION MAKING  Pertinent Labs & Imaging studies reviewed. (See chart for details)  This a 37-year-old male who presents the emergency department with chest discomfort.  The patient does have some slight changes inferiorly on his EKG which is concerning for possible cardiac source.  His troponin is negative.  The patient is young and only has risk factor of dyslipidemia  therefore cardiac source would be less likely.  The chest x-ray does not show any evidence of a pulmonary source and he has been ruled out from a pulmonary embolus via the PERC criteria.  The patient's abdomen is benign therefore referred pain would be unlikely.  I spoke with the patient regarding outpatient cardiac rule out any like to come in the hospital for a stress test in the morning.  This is reasonable the patient does have some slight changes inferiorly on EKG.  He did have aspirin prior to my exam.  The patient admitted to the chest pain unit for further workup.    FINAL IMPRESSION  1.  Chest pain     Disposition  The patient will be admitted in stable condition    Electronically signed by: Omari Fajardo, 9/7/2018 9:59 PM

## 2018-09-08 NOTE — ASSESSMENT & PLAN NOTE
susbternal chest pain, hx of dyslipidemia and obesity. Cigar smoker and alcohol abuse   intial trop and ekg unremarkable   Will trend trop   Check cardiac stress test  Cardiac monitoring

## 2018-09-08 NOTE — PROGRESS NOTES
Nuc Med contacted for status on stress results. Per nuc med, Radiology reading. Pt updated over phone. Pt to come in today for transfusion. Transfer center notified.

## 2018-09-08 NOTE — PROGRESS NOTES
Film room contacted regarding status of stress result. Per film room radiologist working on catching up. Pt updated.

## 2018-09-08 NOTE — PROGRESS NOTES
Transported pt from ER with wife via wheelchair, all belongings and charts with patient, hooked in the monitor,SR on the monitor

## 2018-09-08 NOTE — H&P
Hospital Medicine History & Physical Note    Date of Service  9/7/2018    Primary Care Physician  Pcp Pt States None    Consultants  none    Code Status  full    Chief Complaint  Chest pain    History of Presenting Illness  37 y.o. male who presented 9/7/2018 with chest pain.  Patient presents with acute onset chest pain this morning.  He has a strong family history of cardiac disease as well as diabetes.  Substernal in location described as a sharp pressure.  All associated with dyspnea and dizziness.  No nausea no diaphoresis.  He has a history of dyslipidemia and has been without any medications.  He does not smoke or do any drugs.  He drinks a few times a week.  He has no alleviating or exacerbating factors to her symptoms.    Review of Systems  ROS    Past Medical History  Dyslipidemia     Surgical History   has a past surgical history that includes eye laceration repair (Right, 3/14/2018).     Family History  family history includes Thyroid in his mother. Cardiac disease, diabetes    Social History   reports that he has been smoking Cigars.  He has been smoking about 0.00 packs per day for the past 18.00 years. He has never used smokeless tobacco. He reports that he drinks alcohol. He reports that he uses drugs, including Inhaled and Marijuana.    Allergies  No Known Allergies    Medications  Prior to Admission Medications   Prescriptions Last Dose Informant Patient Reported? Taking?   albuterol 108 (90 Base) MCG/ACT Aero Soln inhalation aerosol 9/7/2018 at 1530  No No   Sig: Inhale 2 Puffs by mouth every 6 hours as needed for Shortness of Breath.      Facility-Administered Medications Last Administration Doses Remaining   aspirin (ASA) chewable tab 324 mg 9/7/2018  6:39 PM 0          Physical Exam  Blood Pressure: 120/80   Temperature: 36.7 °C (98.1 °F)   Pulse: 63   Respiration: 20   Pulse Oximetry: 94 %     Physical Exam   Constitutional: He is oriented to person, place, and time. He appears well-developed  and well-nourished. He appears distressed.   HENT:   Head: Normocephalic and atraumatic.   Eyes: Pupils are equal, round, and reactive to light. Conjunctivae and EOM are normal.   Neck: Normal range of motion. Neck supple. No JVD present.   Cardiovascular: Normal rate, regular rhythm, normal heart sounds and intact distal pulses.    No murmur heard.  Pulmonary/Chest: Effort normal and breath sounds normal. No respiratory distress. He exhibits no tenderness.   Abdominal: Soft. Bowel sounds are normal. He exhibits no distension. There is no tenderness.   Musculoskeletal: Normal range of motion. He exhibits no edema.   Neurological: He is alert and oriented to person, place, and time. No cranial nerve deficit. He exhibits normal muscle tone.   Skin: Skin is warm and dry. No erythema.   Psychiatric: He has a normal mood and affect. His behavior is normal. Judgment and thought content normal.   Nursing note and vitals reviewed.      Laboratory:  Recent Labs      09/07/18 1944   WBC  7.8   RBC  4.64*   HEMOGLOBIN  13.8*   HEMATOCRIT  42.3   MCV  91.2   MCH  29.7   MCHC  32.6*   RDW  45.8   PLATELETCT  229   MPV  10.2     Recent Labs      09/07/18 1944   SODIUM  137   POTASSIUM  4.0   CHLORIDE  106   CO2  24   GLUCOSE  95   BUN  18   CREATININE  0.85   CALCIUM  9.0     Recent Labs      09/07/18 1944   ALTSGPT  14   ASTSGOT  13   ALKPHOSPHAT  38   TBILIRUBIN  0.5   LIPASE  9*   GLUCOSE  95     Recent Labs      09/07/18 1944   APTT  27.9   INR  0.95     Recent Labs      09/07/18 1944   BNPBTYPENAT  5         Lab Results   Component Value Date    TROPONINI <0.01 09/07/2018       Urinalysis:    No results found     Imaging:  DX-CHEST-LIMITED (1 VIEW)   Final Result         1.  No acute cardiopulmonary disease.      NM-CARDIAC STRESS TEST    (Results Pending)         Assessment/Plan:  I anticipate this patient is appropriate for observation status at this time.    * Pain in the chest   Assessment & Plan    susbternal  chest pain, hx of dyslipidemia and obesity. Cigar smoker and alcohol abuse   intial trop and ekg unremarkable   Will trend trop   Check cardiac stress test  Cardiac monitoring        Tobacco abuse   Assessment & Plan    Smokes cigars  Encouraged cessation        Asthma   Assessment & Plan    resp care protocol ordered        Dyslipidemia   Assessment & Plan    Hx of has been without medications   Will check lipid panel may need to add statin        Obesity (BMI 35.0-39.9 without comorbidity)   Assessment & Plan    Encouraged weight loss        Alcohol abuse   Assessment & Plan    Drinks multiple times a week, has been cutting back   Encouraged cessation   Monitor for withdrawals             VTE prophylaxis: heparin

## 2018-09-08 NOTE — PROGRESS NOTES
A/o,  respirations are even and unlabored on room air, ,assessment completed, vital signs stable, SR  on the monitor, pt complaints of chest pressure 3/10, updated communication board,  poc discussed and understood, verbalized understanding, box meal given, pt aware NPO for stress test,  all questions answered at this time , fall precautions in place, call button within reach, will continue to monitor

## 2018-09-08 NOTE — PROGRESS NOTES
Bedside report received 0723. POC discussed with pt; Pt denies CP, but c/o pressure of same type , worsening with movement; No overnight cardiac events; all questions answered at this time.

## 2018-09-08 NOTE — ASSESSMENT & PLAN NOTE
Drinks multiple times a week, has been cutting back   Encouraged cessation   Monitor for withdrawals

## 2018-09-08 NOTE — PROGRESS NOTES
Awaiting stress test results; no signs of distress. No change from previous assessment. Fall precautions in place. Will continue to monitor.

## 2018-09-09 ENCOUNTER — PATIENT OUTREACH (OUTPATIENT)
Dept: HEALTH INFORMATION MANAGEMENT | Facility: OTHER | Age: 38
End: 2018-09-09

## 2018-09-09 LAB
EST. AVERAGE GLUCOSE BLD GHB EST-MCNC: 120 MG/DL
HBA1C MFR BLD: 5.8 % (ref 0–5.6)

## 2018-09-09 NOTE — PROGRESS NOTES
Pt dc'd home. IV and monitor removed. Pt left unit via walking with wife, Refused hospital escort. Personal belongings with pt when leaving unit. Pt given discharge instructions prior to leaving unit including prescription and when to visit with physician; verbalizes understanding. Copy of discharge instructions with pt and in the chart.

## 2018-09-09 NOTE — PROGRESS NOTES
Placed discharge outreach phone call to pt s/p hospital discharge 9/8/18.  Left voicemail providing my contact information and instructions to call with any questions or concerns.

## 2018-09-09 NOTE — DISCHARGE SUMMARY
Discharge Summary    CHIEF COMPLAINT ON ADMISSION  Chief Complaint   Patient presents with   • Chest Pain     0800 onset, constant worse when leaning over. radiates to neck.    • Dizziness   • Sent from Urgent Care     Dr Hernandez       Reason for Admission  Chest pain     Admission Date  9/7/2018    CODE STATUS  Full Code    HPI & HOSPITAL COURSE  This is a 37 y.o. male here with no significant past medical history, however was told he had dyslipidemia in the past but has not been on any medications and endorses strong family history of heart disease and diabetes who presents to [facility] emergency room with chief complaint of chest pain.  Patient described pain as substernal in location and described the pain as sharp.  Did have associated dyspnea and dizziness, however no nausea or diaphoresis.  Patient does state that he has lost approximately 30-40 pounds in the last 2 years and has changed his lifestyle in which he states he has quit smoking and drinking only a few times a month.  While in the emergency room patient had a full laboratory workup complete which included a CBC which did not show evidence of infectious process or acute blood loss.  CMP also performed and values all within normal limits with repeat BMP on 9/8/18 showing mild hypokalemia that was asymptomatic and replacement was given with improvement.  Coagulation studies within normal limits.  Initial troponin level in the emergency room was negative.  12-lead EKG performed in emergency room reveals normal sinus rhythm with a heart rate of 75, no axis deviation, however changes noted inferiorly concerning for possible cardiac source. Aortic dissection or pulmonary embolism unlikely based on history and exam.  Chest x-ray is negative for pneumonia, pneumothorax, or pulmonary edema. Chest x-ray does not show any evidence of pulmonary/infectious source and he has been ruled out pulmonary embolism via PERC criteria. Labs are reassuring without evidence of  transaminitis or elevated lipase concerning for cholecystitis or pancreatitis.  Due to patient's history of dyslipidemia, obesity, and strong family history of heart disease and diabetes patient was subsequently patient was admitted for further monitoring/evaluation and management to clinical decision-making unit.      While in clinical decision making further laboratory workup and imaging was performed which included hemoglobin A1c and lipid panel which is in process and pending at time of discharge.  Stress test also performed with results showing no evidence of significant or jeopardized viable myocardium or prior myocardial infarction, normal left ventricular size, ejection fraction, and wall motion. EF 76%.  Thus, unlikely related to acute cardiac process.  Recommendations for patient to follow-up with primary care provider in the next 1-2 weeks, in addition patient given extensive education greater than 10 minutes on the increase of morbidity and mortality associated with excess weight including DM, Heart Disease, HTN, stroke, and sleep apnea.  Pt advised weight loss of 5% through reduced calorie, low fat diet and 150 mins of exercise a week. Therefore, he is discharged in good and stable condition to home with close outpatient follow-up.    The patient recovered much more quickly than anticipated on admission.    Discharge Date  9/8/2018    DISCHARGE DIAGNOSES  Principal Problem:    Pain in the chest POA: Unknown  Active Problems:    Alcohol abuse POA: Unknown    Obesity (BMI 35.0-39.9 without comorbidity) POA: Unknown    Dyslipidemia POA: Unknown    Asthma POA: Unknown    Tobacco abuse POA: Unknown  Resolved Problems:    * No resolved hospital problems. *      FOLLOW UP  Future Appointments  Date Time Provider Department Center   9/12/2018 3:15 PM Jaida Orellana M.D. [facility] None   11/16/2018 1:20 PM Ella Townsend M.D. [facility] None       MEDICATIONS ON DISCHARGE     Medication List      CONTINUE  taking these medications      Instructions   albuterol 108 (90 Base) MCG/ACT Aers inhalation aerosol   Inhale 2 Puffs by mouth every 6 hours as needed for Shortness of Breath.  Dose:  2 Puff          Allergies  No Known Allergies    DIET  Orders Placed This Encounter   Procedures   • Diet Order Regular     Standing Status:   Standing     Number of Occurrences:   1     Order Specific Question:   Diet:     Answer:   Regular [1]     Order Specific Question:   Miscellaneous modifications:     Answer:   No Decaf, No Caffeine(for test) [11]     Comments:   Protocol 1313 Patient to have no caffeine for 12 hours prior to exam (decaf, coffee, cola, tea, chocolate)       ACTIVITY  As tolerated.  Weight bearing as tolerated    CONSULTATIONS  None    PROCEDURES  STRESS TEST (09/08/18):   No evidence of significant jeopardized viable myocardium or prior myocardial infarction.  Normal left ventricular size, ejection fraction, and wall motion. EF 76%      LABORATORY  Lab Results   Component Value Date    SODIUM 142 09/08/2018    POTASSIUM 3.2 (L) 09/08/2018    CHLORIDE 111 09/08/2018    CO2 22 09/08/2018    GLUCOSE 101 (H) 09/08/2018    BUN 18 09/08/2018    CREATININE 0.74 09/08/2018        Lab Results   Component Value Date    WBC 8.2 09/08/2018    HEMOGLOBIN 13.1 (L) 09/08/2018    HEMATOCRIT 40.4 (L) 09/08/2018    PLATELETCT 208 09/08/2018        IMAGING  NM-CARDIAC STRESS TEST   Final Result      DX-CHEST-LIMITED (1 VIEW)   Final Result         1.  No acute cardiopulmonary disease.            Total time of the discharge process exceeds 37 minutes.      LILIA Flores.

## 2018-09-09 NOTE — DISCHARGE INSTRUCTIONS
Discharge Instructions    Discharged to home by car with relative. Discharged via wheelchair, hospital escort: Yes.  Special equipment needed: Not Applicable    Be sure to schedule a follow-up appointment with your primary care doctor or any specialists as instructed.     Discharge Plan:   Diet Plan: Discussed  Activity Level: Discussed  Confirmed Follow up Appointment: Patient to Call and Schedule Appointment  Confirmed Symptoms Management: Discussed  Medication Reconciliation Updated: Yes  Influenza Vaccine Indication: Patient Refuses    I understand that a diet low in cholesterol, fat, and sodium is recommended for good health. Unless I have been given specific instructions below for another diet, I accept this instruction as my diet prescription.   Other diet: Heart Healthy     Special Instructions: None    · Is patient discharged on Warfarin / Coumadin?   No     Depression / Suicide Risk    As you are discharged from this [facility] facility, it is important to learn how to keep safe from harming yourself.    Recognize the warning signs:  · Abrupt changes in personality, positive or negative- including increase in energy   · Giving away possessions  · Change in eating patterns- significant weight changes-  positive or negative  · Change in sleeping patterns- unable to sleep or sleeping all the time   · Unwillingness or inability to communicate  · Depression  · Unusual sadness, discouragement and loneliness  · Talk of wanting to die  · Neglect of personal appearance   · Rebelliousness- reckless behavior  · Withdrawal from people/activities they love  · Confusion- inability to concentrate     If you or a loved one observes any of these behaviors or has concerns about self-harm, here's what you can do:  · Talk about it- your feelings and reasons for harming yourself  · Remove any means that you might use to hurt yourself (examples: pills, rope, extension cords, firearm)  · Get professional help from the  community (Mental Health, Substance Abuse, psychological counseling)  · Do not be alone:Call your Safe Contact- someone whom you trust who will be there for you.  · Call your local CRISIS HOTLINE 744-4358 or 972-849-7687  · Call your local Children's Mobile Crisis Response Team [place] (271) 756-8210 or www.Agillic  · Call the toll free National Suicide Prevention Hotlines   · National Suicide Prevention Lifeline 910-096-YSTD (3069)  · National Hope Line Network 800-SUICIDE (991-7921)      Chest Pain Observation  It is often hard to give a specific diagnosis for the cause of chest pain. Among other possibilities your symptoms might be caused by inadequate oxygen delivery to your heart (angina). Angina that is not treated or evaluated can lead to a heart attack (myocardial infarction) or death.  Blood tests, electrocardiograms, and X-rays may have been done to help determine a possible cause of your chest pain. After evaluation and observation, your health care provider has determined that it is unlikely your pain was caused by an unstable condition that requires hospitalization. However, a full evaluation of your pain may need to be completed, with additional diagnostic testing as directed. It is very important to keep your follow-up appointments. Not keeping your follow-up appointments could result in permanent heart damage, disability, or death. If there is any problem keeping your follow-up appointments, you must call your health care provider.  HOME CARE INSTRUCTIONS   Due to the slight chance that your pain could be angina, it is important to follow your health care provider's treatment plan and also maintain a healthy lifestyle:  · Maintain or work toward achieving a healthy weight.  · Stay physically active and exercise regularly.  · Decrease your salt intake.  · Eat a balanced, healthy diet. Talk to a dietitian to learn about heart-healthy foods.  · Increase your fiber intake by including  whole grains, vegetables, fruits, and nuts in your diet.  · Avoid situations that cause stress, anger, or depression.  · Take medicines as advised by your health care provider. Report any side effects to your health care provider. Do not stop medicines or adjust the dosages on your own.  · Quit smoking. Do not use nicotine patches or gum until you check with your health care provider.  · Keep your blood pressure, blood sugar, and cholesterol levels within normal limits.  · Limit alcohol intake to no more than 1 drink per day for women who are not pregnant and 2 drinks per day for men.  · Do not abuse drugs.  SEEK IMMEDIATE MEDICAL CARE IF:  You have severe chest pain or pressure which may include symptoms such as:  · You feel pain or pressure in your arms, neck, jaw, or back.  · You have severe back or abdominal pain, feel sick to your stomach (nauseous), or throw up (vomit).  · You are sweating profusely.  · You are having a fast or irregular heartbeat.  · You feel short of breath while at rest.  · You notice increasing shortness of breath during rest, sleep, or with activity.  · You have chest pain that does not get better after rest or after taking your usual medicine.  · You wake from sleep with chest pain.  · You are unable to sleep because you cannot breathe.  · You develop a frequent cough or you are coughing up blood.  · You feel dizzy, faint, or experience extreme fatigue.  · You develop severe weakness, dizziness, fainting, or chills.  Any of these symptoms may represent a serious problem that is an emergency. Do not wait to see if the symptoms will go away. Call your local emergency services (911 in the [place] Do not drive yourself to the hospital.  MAKE SURE YOU:  · Understand these instructions.  · Will watch your condition.  · Will get help right away if you are not doing well or get worse.     This information is not intended to replace advice given to you by your health care provider. Make sure you  discuss any questions you have with your health care provider.     Document Released: 01/20/2012 Document Revised: 12/23/2014 Document Reviewed: 06/19/2014  Elsevier Interactive Patient Education ©2016 [employer]

## 2018-09-10 NOTE — PATIENT INSTRUCTIONS
Chest Pain, Nonspecific  It is often hard to give a specific diagnosis for the cause of chest pain. There is always a chance that your pain could be related to something serious, like a heart attack or a blood clot in the lungs. You need to follow up with your caregiver for further evaluation. More lab tests or other studies such as X-rays, electrocardiography, stress testing, or cardiac imaging may be needed to find the cause of your pain.  Most of the time, nonspecific chest pain improves within 2 to 3 days with rest and mild pain medicine. For the next few days, avoid physical exertion or activities that bring on pain. Do not smoke. Avoid drinking alcohol. Call your caregiver for routine follow-up as advised.   SEEK IMMEDIATE MEDICAL CARE IF:  · You develop increased chest pain or pain that radiates to the arm, neck, jaw, back, or abdomen.   · You develop shortness of breath, increased coughing, or you start coughing up blood.   · You have severe back or abdominal pain, nausea, or vomiting.   · You develop severe weakness, fainting, fever, or chills.   Document Released: 12/18/2006 Document Revised: 03/11/2013 Document Reviewed: 06/06/2008  [employer]® Patient Information ©2013 [employer].

## 2018-09-10 NOTE — PROGRESS NOTES
"Subjective:      Pt is a 37 y.o. male who presents with Chest Pain (neck pain, pressure on chest, feels pressure more when bending over)            HPI  Pt notes chest pain, middle to left chest region he notes with radiating jaw and neck pain but no pain in his left arm x 2-3 hours now. Pt notes pain is made worse with bending. Pt has not taken any Rx medications for this condition. Pt denies known cardia hx for himself. Pt states the pain is a 7/10, aching in nature and worse \"right now\". Pt denies  SOB, NVD, paresthesias, headaches, dizziness, change in vision, hives, or other joint pain. The pt's medication list, problem list, and allergies have been evaluated and reviewed during today's visit.    PMH:  Negative per pt.      PSH:  Past Surgical History:   Procedure Laterality Date   • EYE LACERATION REPAIR Right 3/14/2018    Procedure: EYE LACERATION REPAIR-EYE LID REPAIR;  Surgeon: Mariann Avalos M.D.;  Location: SURGERY SAME DAY [facility];  Service: Ent       Fam Hx:    family history includes Thyroid in his mother.  Family Status   Relation Status   • Mo (Not Specified)       Soc HX:  Social History     Social History   • Marital status:      Spouse name: N/A   • Number of children: N/A   • Years of education: N/A     Occupational History   • Not on file.     Social History Main Topics   • Smoking status: Current Every Day Smoker     Packs/day: 0.00     Years: 18.00     Types: Cigars   • Smokeless tobacco: Never Used      Comment: 1 cigar a day    • Alcohol use Yes      Comment: Today   • Drug use: Yes     Types: Inhaled, Marijuana      Comment: hasnt smoked in over one year   • Sexual activity: Not on file     Other Topics Concern   • Not on file     Social History Narrative    ** Merged History Encounter **              Medications:    Current Outpatient Prescriptions:   •  albuterol 108 (90 Base) MCG/ACT Aero Soln inhalation aerosol, Inhale 2 Puffs by mouth every 6 hours as needed for Shortness " "of Breath., Disp: 8.5 g, Rfl: 0      Allergies:  Patient has no known allergies.    ROS  Constitutional: Negative for fever, chills and malaise/fatigue.   HENT: Negative for congestion and sore throat.    Eyes: Negative for blurred vision, double vision and photophobia.   Respiratory: Negative for cough and shortness of breath.  Cardiovascular: POS for chest pain and palpitations.   Gastrointestinal: Negative for heartburn, nausea, vomiting, abdominal pain, diarrhea and constipation.   Genitourinary: Negative for dysuria and flank pain.   Musculoskeletal: Negative for joint pain and myalgias.   Skin: Negative for itching and rash.   Neurological: Negative for dizziness, tingling and headaches.   Endo/Heme/Allergies: Does not bruise/bleed easily.   Psychiatric/Behavioral: Negative for depression. The patient is not nervous/anxious.           Objective:     /78   Pulse 82   Temp 36.6 °C (97.9 °F)   Resp 16   Ht 1.702 m (5' 7\")   Wt 98.4 kg (217 lb)   SpO2 98%   BMI 33.99 kg/m²      Physical Exam       Constitutional: PT is oriented to person, place, and time. PT appears well-developed and well-nourished. No distress.   HENT:   Head: Normocephalic and atraumatic.   Mouth/Throat: Oropharynx is clear and moist. No oropharyngeal exudate.   Eyes: Conjunctivae normal and EOM are normal. Pupils are equal, round, and reactive to light.   Neck: Normal range of motion. Neck supple. No thyromegaly present.   Cardiovascular: Normal rate, regular rhythm, normal heart sounds and intact distal pulses.  Exam reveals no gallop and no friction rub.    No murmur heard.  Pulmonary/Chest: Effort normal and breath sounds normal. No respiratory distress. PT has no wheezes. PT has no rales. Pt exhibits no tenderness.   Abdominal: Soft. Bowel sounds are normal. PT exhibits no distension and no mass. There is no tenderness. There is no rebound and no guarding.   Musculoskeletal: Normal range of motion. PT exhibits no edema and no " tenderness.   Neurological: PT is alert and oriented to person, place, and time. PT has normal reflexes. No cranial nerve deficit.   Skin: Skin is warm and dry. No rash noted. PT is not diaphoretic. No erythema.       Psychiatric: PT has a normal mood and affect. PT behavior is normal. Judgment and thought content normal.        Assessment/Plan:     1. Chest pain, unspecified type    - UC AMA/Refusal of Treatment    2. Dizziness    - UC AMA/Refusal of Treatment    3. Jaw pain    - UC AMA/Refusal of Treatment     mg chewable given in clinic  EKG--> ST elevations but likely early repolarization pattern suggested  TO [facility] ER NOW for further evaluation. Spoke with Dr. aFjardo on the phone, attending at the ER , and pt was graciously accepted transfer of care for further imaging and evaluation of the pt. Reiterated to patient that although a provider to provider transfer was made this will not necessarily expedite the ER process.    Pt defers EMS transport and signs AMA with significant other driving him to ER now  Rest, fluids encouraged.  AVS with medical info given.  Pt was in full understanding and agreement with the plan.  Follow-up as needed if symptoms worsen or fail to improve.

## 2018-09-12 ENCOUNTER — OFFICE VISIT (OUTPATIENT)
Dept: INTERNAL MEDICINE | Facility: MEDICAL CENTER | Age: 38
End: 2018-09-12
Payer: COMMERCIAL

## 2018-09-12 VITALS
HEART RATE: 70 BPM | TEMPERATURE: 98.4 F | WEIGHT: 223.4 LBS | HEIGHT: 68 IN | DIASTOLIC BLOOD PRESSURE: 74 MMHG | BODY MASS INDEX: 33.86 KG/M2 | SYSTOLIC BLOOD PRESSURE: 112 MMHG | OXYGEN SATURATION: 95 %

## 2018-09-12 DIAGNOSIS — Z83.49 FAMILY HISTORY OF THYROID DISEASE: ICD-10-CM

## 2018-09-12 DIAGNOSIS — R73.03 PREDIABETES: ICD-10-CM

## 2018-09-12 DIAGNOSIS — L81.8 TATTOOS: ICD-10-CM

## 2018-09-12 DIAGNOSIS — Z72.0 TOBACCO ABUSE: ICD-10-CM

## 2018-09-12 DIAGNOSIS — E78.1 HYPERTRIGLYCERIDEMIA: ICD-10-CM

## 2018-09-12 PROBLEM — T14.90XA TRAUMA: Status: RESOLVED | Noted: 2018-03-14 | Resolved: 2018-09-12

## 2018-09-12 PROBLEM — S02.30XB: Status: RESOLVED | Noted: 2018-03-14 | Resolved: 2018-09-12

## 2018-09-12 PROBLEM — F10.929 ALCOHOL INTOXICATION (HCC): Status: RESOLVED | Noted: 2018-03-14 | Resolved: 2018-09-12

## 2018-09-12 PROBLEM — R07.9 PAIN IN THE CHEST: Status: RESOLVED | Noted: 2018-09-07 | Resolved: 2018-09-12

## 2018-09-12 PROBLEM — S01.111A: Status: RESOLVED | Noted: 2018-03-14 | Resolved: 2018-09-12

## 2018-09-12 PROCEDURE — 99204 OFFICE O/P NEW MOD 45 MIN: CPT | Mod: GC | Performed by: INTERNAL MEDICINE

## 2018-09-12 ASSESSMENT — PAIN SCALES - GENERAL: PAINLEVEL: NO PAIN

## 2018-09-12 NOTE — PATIENT INSTRUCTIONS
"Food Choices to Lower Your Triglycerides  Triglycerides are a type of fat in your blood. High levels of triglycerides can increase the risk of heart disease and stroke. If your triglyceride levels are high, the foods you eat and your eating habits are very important. Choosing the right foods can help lower your triglycerides.   WHAT GENERAL GUIDELINES DO I NEED TO FOLLOW?  · Lose weight if you are overweight.    · Limit or avoid alcohol.    · Fill one half of your plate with vegetables and green salads.    · Limit fruit to two servings a day. Choose fruit instead of juice.    · Make one fourth of your plate whole grains. Look for the word \"whole\" as the first word in the ingredient list.  · Fill one fourth of your plate with lean protein foods.  · Enjoy fatty fish (such as salmon, mackerel, sardines, and tuna) three times a week.    · Choose healthy fats.    · Limit foods high in starch and sugar.  · Eat more home-cooked food and less restaurant, buffet, and fast food.  · Limit fried foods.  · Cook foods using methods other than frying.  · Limit saturated fats.  · Check ingredient lists to avoid foods with partially hydrogenated oils (trans fats) in them.  WHAT FOODS CAN I EAT?   Grains  Whole grains, such as whole wheat or whole grain breads, crackers, cereals, and pasta. Unsweetened oatmeal, bulgur, barley, quinoa, or brown rice. Corn or whole wheat flour tortillas.   Vegetables  Fresh or frozen vegetables (raw, steamed, roasted, or grilled). Green salads.  Fruits  All fresh, canned (in natural juice), or frozen fruits.  Meat and Other Protein Products  Ground beef (85% or leaner), grass-fed beef, or beef trimmed of fat. Skinless chicken or turkey. Ground chicken or turkey. Pork trimmed of fat. All fish and seafood. Eggs. Dried beans, peas, or lentils. Unsalted nuts or seeds. Unsalted canned or dry beans.  Dairy  Low-fat dairy products, such as skim or 1% milk, 2% or reduced-fat cheeses, low-fat ricotta or cottage " cheese, or plain low-fat yogurt.  Fats and Oils  Tub margarines without trans fats. Light or reduced-fat mayonnaise and salad dressings. Avocado. Safflower, olive, or canola oils. Natural peanut or almond butter.  The items listed above may not be a complete list of recommended foods or beverages. Contact your dietitian for more options.  WHAT FOODS ARE NOT RECOMMENDED?   Grains  White bread. White pasta. White rice. Cornbread. Bagels, pastries, and croissants. Crackers that contain trans fat.  Vegetables  White potatoes. Corn. Creamed or fried vegetables. Vegetables in a cheese sauce.  Fruits  Dried fruits. Canned fruit in light or heavy syrup. Fruit juice.  Meat and Other Protein Products  Fatty cuts of meat. Ribs, chicken wings, au, sausage, bologna, salami, chitterlings, fatback, hot dogs, bratwurst, and packaged luncheon meats.  Dairy  Whole or 2% milk, cream, half-and-half, and cream cheese. Whole-fat or sweetened yogurt. Full-fat cheeses. Nondairy creamers and whipped toppings. Processed cheese, cheese spreads, or cheese curds.  Sweets and Desserts  Corn syrup, sugars, honey, and molasses. Candy. Jam and jelly. Syrup. Sweetened cereals. Cookies, pies, cakes, donuts, muffins, and ice cream.  Fats and Oils  Butter, stick margarine, lard, shortening, ghee, or au fat. Coconut, palm kernel, or palm oils.  Beverages  Alcohol. Sweetened drinks (such as sodas, lemonade, and fruit drinks or punches).  The items listed above may not be a complete list of foods and beverages to avoid. Contact your dietitian for more information.     This information is not intended to replace advice given to you by your health care provider. Make sure you discuss any questions you have with your health care provider.     Document Released: 10/05/2005 Document Revised: 01/08/2016 Document Reviewed: 10/22/2014  [employer] Patient Education ©2016 [employer]      Prediabetes  Prediabetes is the condition of having a blood  sugar (blood glucose) level that is higher than it should be, but not high enough for you to be diagnosed with type 2 diabetes. Having prediabetes puts you at risk for developing type 2 diabetes (type 2 diabetes mellitus). Prediabetes may be called impaired glucose tolerance or impaired fasting glucose.  Prediabetes usually does not cause symptoms. Your health care provider can diagnose this condition with blood tests. You may be tested for prediabetes if you are overweight and if you have at least one other risk factor for prediabetes.  Risk factors for prediabetes include:  · Having a family member with type 2 diabetes.  · Being overweight or obese.  · Being older than age 45.  · Being of American-, -American, /, or / descent.  · Having an inactive (sedentary) lifestyle.  · Having a history of gestational diabetes or polycystic ovarian syndrome (PCOS).  · Having low levels of good cholesterol (HDL-C) or high levels of blood fats (triglycerides).  · Having high blood pressure.  What is blood glucose and how is blood glucose measured?     Blood glucose refers to the amount of glucose in your bloodstream. Glucose comes from eating foods that contain sugars and starches (carbohydrates) that the body breaks down into glucose. Your blood glucose level may be measured in mg/dL (milligrams per deciliter) or mmol/L (millimoles per liter). Your blood glucose may be checked with one or more of the following blood tests:  · A fasting blood glucose (FBG) test. You will not be allowed to eat (you will fast) for at least 8 hours before a blood sample is taken.  ¨ A normal range for FBG is  mg/dl (3.9-5.6 mmol/L).  · An A1c (hemoglobin A1c) blood test. This test provides information about blood glucose control over the previous 2?3 months.  · An oral glucose tolerance test (OGTT). This test measures your blood glucose twice:  ¨ After fasting. This is your baseline  level.  ¨ Two hours after you drink a beverage that contains glucose.  You may be diagnosed with prediabetes:  · If your FBG is 100?125 mg/dL (5.6-6.9 mmol/L).  · If your A1c level is 5.7?6.4%.  · If your OGGT result is 140?199 mg/dL (7.8-11 mmol/L).  These blood tests may be repeated to confirm your diagnosis.  What happens if blood glucose is too high?  The pancreas produces a hormone (insulin) that helps move glucose from the bloodstream into cells. When cells in the body do not respond properly to insulin that the body makes (insulin resistance), excess glucose builds up in the blood instead of going into cells. As a result, high blood glucose (hyperglycemia) can develop, which can cause many complications. This is a symptom of prediabetes.  What can happen if blood glucose stays higher than normal for a long time?  Having high blood glucose for a long time is dangerous. Too much glucose in your blood can damage your nerves and blood vessels. Long-term damage can lead to complications from diabetes, which may include:  · Heart disease.  · Stroke.  · Blindness.  · Kidney disease.  · Depression.  · Poor circulation in the feet and legs, which could lead to surgical removal (amputation) in severe cases.  How can prediabetes be prevented from turning into type 2 diabetes?     To help prevent type 2 diabetes, take the following actions:  · Be physically active.  ¨ Do moderate-intensity physical activity for at least 30 minutes on at least 5 days of the week, or as much as told by your health care provider. This could be brisk walking, biking, or water aerobics.  ¨ Ask your health care provider what activities are safe for you. A mix of physical activities may be best, such as walking, swimming, cycling, and strength training.  · Lose weight as told by your health care provider.  ¨ Losing 5-7% of your body weight can reverse insulin resistance.  ¨ Your health care provider can determine how much weight loss is best  for you and can help you lose weight safely.  · Follow a healthy meal plan. This includes eating lean proteins, complex carbohydrates, fresh fruits and vegetables, low-fat dairy products, and healthy fats.  ¨ Follow instructions from your health care provider about eating or drinking restrictions.  ¨ Make an appointment to see a diet and nutrition specialist (registered dietitian) to help you create a healthy eating plan that is right for you.  · Do not smoke or use any tobacco products, such as cigarettes, chewing tobacco, and e-cigarettes. If you need help quitting, ask your health care provider.  · Take over-the-counter and prescription medicines as told by your health care provider. You may be prescribed medicines that help lower the risk of type 2 diabetes.  This information is not intended to replace advice given to you by your health care provider. Make sure you discuss any questions you have with your health care provider.  Document Released: 04/10/2017 Document Revised: 05/25/2017 Document Reviewed: 02/07/2017  [employer] Interactive Patient Education © 2017 [employer]
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
## 2019-01-17 ENCOUNTER — TELEPHONE (OUTPATIENT)
Dept: CARDIOLOGY | Facility: PHYSICIAN GROUP | Age: 39
End: 2019-01-17

## 2019-01-17 NOTE — TELEPHONE ENCOUNTER
Left message for patient regarding cardiac records 1/17/2019     Appt in [place] with Dr. Townsend 1/18/2019

## 2019-05-31 ENCOUNTER — OFFICE VISIT (OUTPATIENT)
Dept: URGENT CARE | Facility: PHYSICIAN GROUP | Age: 39
End: 2019-05-31

## 2019-05-31 VITALS
TEMPERATURE: 98.2 F | OXYGEN SATURATION: 97 % | HEART RATE: 104 BPM | HEIGHT: 68 IN | WEIGHT: 256 LBS | BODY MASS INDEX: 38.8 KG/M2 | SYSTOLIC BLOOD PRESSURE: 128 MMHG | RESPIRATION RATE: 14 BRPM | DIASTOLIC BLOOD PRESSURE: 86 MMHG

## 2019-05-31 DIAGNOSIS — Z02.1 PRE-EMPLOYMENT DRUG SCREENING: ICD-10-CM

## 2019-05-31 DIAGNOSIS — Z02.1 PHYSICAL EXAM, PRE-EMPLOYMENT: ICD-10-CM

## 2019-05-31 PROCEDURE — 8915 PR COMPREHENSIVE PHYSICAL: Performed by: NURSE PRACTITIONER

## 2019-05-31 PROCEDURE — 8907 PR URINE COLLECT ONLY: Performed by: NURSE PRACTITIONER

## 2019-06-01 NOTE — PROGRESS NOTES
This is a new problem.  Bharath is a 38-year-old male patient here for an employment physical.  See history and physical forms attached to this visit.      Objective: See physical exam forms attached to this visit    A:   1. Physical exam, pre-employment       P:   No restrictions.  PASS  physical exam.

## 2019-10-26 ENCOUNTER — APPOINTMENT (OUTPATIENT)
Dept: RADIOLOGY | Facility: IMAGING CENTER | Age: 39
End: 2019-10-26
Attending: FAMILY MEDICINE
Payer: COMMERCIAL

## 2019-10-26 ENCOUNTER — OFFICE VISIT (OUTPATIENT)
Dept: URGENT CARE | Facility: CLINIC | Age: 39
End: 2019-10-26
Payer: COMMERCIAL

## 2019-10-26 VITALS
HEART RATE: 78 BPM | BODY MASS INDEX: 40.47 KG/M2 | RESPIRATION RATE: 16 BRPM | HEIGHT: 68 IN | TEMPERATURE: 98.8 F | OXYGEN SATURATION: 95 % | SYSTOLIC BLOOD PRESSURE: 118 MMHG | WEIGHT: 267 LBS | DIASTOLIC BLOOD PRESSURE: 78 MMHG

## 2019-10-26 DIAGNOSIS — M54.2 NECK PAIN: ICD-10-CM

## 2019-10-26 DIAGNOSIS — M47.812 CERVICAL SPONDYLOSIS: ICD-10-CM

## 2019-10-26 DIAGNOSIS — R20.2 PARESTHESIA: ICD-10-CM

## 2019-10-26 DIAGNOSIS — M54.9 UPPER BACK PAIN: ICD-10-CM

## 2019-10-26 DIAGNOSIS — M43.9 COMPRESSION DEFORMITY OF VERTEBRA: ICD-10-CM

## 2019-10-26 PROCEDURE — 72050 X-RAY EXAM NECK SPINE 4/5VWS: CPT | Mod: TC | Performed by: FAMILY MEDICINE

## 2019-10-26 PROCEDURE — 99214 OFFICE O/P EST MOD 30 MIN: CPT | Performed by: FAMILY MEDICINE

## 2019-10-26 RX ORDER — DICLOFENAC SODIUM 75 MG/1
75 TABLET, DELAYED RELEASE ORAL
Qty: 20 TAB | Refills: 0 | Status: SHIPPED | OUTPATIENT
Start: 2019-10-26 | End: 2019-11-05

## 2019-10-26 RX ORDER — METHOCARBAMOL 500 MG/1
500 TABLET, FILM COATED ORAL NIGHTLY PRN
Qty: 10 TAB | Refills: 0 | Status: SHIPPED | OUTPATIENT
Start: 2019-10-26 | End: 2019-11-05

## 2019-10-26 RX ORDER — KETOROLAC TROMETHAMINE 30 MG/ML
30 INJECTION, SOLUTION INTRAMUSCULAR; INTRAVENOUS ONCE
Status: COMPLETED | OUTPATIENT
Start: 2019-10-26 | End: 2019-10-26

## 2019-10-26 RX ADMIN — KETOROLAC TROMETHAMINE 30 MG: 30 INJECTION, SOLUTION INTRAMUSCULAR; INTRAVENOUS at 14:47

## 2019-10-26 NOTE — PATIENT INSTRUCTIONS
Diclofenac twice daily as directed with food for pain, icing or heat frequently.  Referral to orthopedic was placed, he should be getting back to you in the next few days.  Muscle relaxer at night as needed.  Please do not combine it with alcohol.  Do not drive while on muscle relaxer.    If you have any worsening or new symptoms come back to urgent care otherwise follow-up with orthopedics      Would recommend establishing care and seeing primary doctor in the next few weeks.  He can contact  if you do not have a primary care doctor and they can help you.

## 2019-10-26 NOTE — PROGRESS NOTES
"Subjective:      Bharath Diaz is a 39 y.o. male who presents with Back Pain (middle of back, states no injury)            This is a new problem.  39-year-old presenting for evaluation of mid back pain that starts after he was moving from one side to another without lifting or any injury today.  No paresthesia or radiculopathy reported.  Review of system otherwise positive intermittent paresthesias in both hands which she has noticed at night when he moves certain way.  Denies any previous or recent neck injury.  No fever or chills.  He denies any muscle weakness or paresthesias at the present time.  No history of carpal tunnel in the past.  He has not taken any OTC medication.      Review of Systems   All other systems reviewed and are negative.         Objective:     /78 (BP Location: Right arm, Patient Position: Sitting)   Pulse 78   Temp 37.1 °C (98.8 °F)   Resp 16   Ht 1.727 m (5' 8\")   Wt 121.1 kg (267 lb)   SpO2 95%   BMI 40.60 kg/m²      Physical Exam   Constitutional: He is oriented to person, place, and time. He appears well-developed and well-nourished.  Non-toxic appearance. No distress.   HENT:   Head: Normocephalic and atraumatic.   Right Ear: External ear normal.   Left Ear: External ear normal.   Nose: Nose normal.   Eyes: Conjunctivae are normal.   Neck: Normal range of motion. No spinous process tenderness and no muscular tenderness present. No neck rigidity. No edema, no erythema and normal range of motion present.   Negative Spurling sign bilaterally   Cardiovascular: Normal rate.   Pulmonary/Chest: Effort normal. No respiratory distress.   Musculoskeletal:        Cervical back: He exhibits normal range of motion, no tenderness, no bony tenderness, no swelling, no edema, no deformity, no laceration, no spasm and normal pulse.        Thoracic back: He exhibits tenderness (In the paraspinal region outline in the picture.). He exhibits normal range of motion, no bony tenderness, no " swelling, no edema, no deformity, no laceration and normal pulse.        Back:    Inspection of the upper extremity does not show any muscle wasting.  Full range of motion in both upper extremities.  Interossei muscles are intact.  Tinel test on both sides showed mild paresthesia only in the middle finger distribution.  No erythema in the hands, no muscle wasting.   Neurological: He is alert and oriented to person, place, and time. He has normal strength. He displays no atrophy and normal reflexes. No sensory deficit. Gait normal.   Negative straight leg raising bilaterally   Skin: Skin is warm. No rash noted. He is not diaphoretic. No erythema. No pallor.   Psychiatric: He has a normal mood and affect.        4 view cervical x-ray showed:  1.  Chronic mild C7 superior endplate compression deformity. No acute osseous abnormality.  2.  Mild cervical spondylosis. No osseous foraminal stenosis.       Assessment/Plan:     1. Upper back pain  - ketorolac (TORADOL) injection 30 mg  - diclofenac EC (VOLTAREN) 75 MG Tablet Delayed Response; Take 1 Tab by mouth 2 times daily with meals as needed for up to 10 days.  Dispense: 20 Tab; Refill: 0  - REFERRAL TO ORTHOPEDICS  - REFERRAL TO FAMILY PRACTICE  - methocarbamol (ROBAXIN) 500 MG Tab; Take 1 Tab by mouth at bedtime as needed for up to 10 days.  Dispense: 10 Tab; Refill: 0    2. Neck pain  - DX-CERVICAL SPINE-4+ VIEWS; Future  - diclofenac EC (VOLTAREN) 75 MG Tablet Delayed Response; Take 1 Tab by mouth 2 times daily with meals as needed for up to 10 days.  Dispense: 20 Tab; Refill: 0  - REFERRAL TO ORTHOPEDICS  - REFERRAL TO FAMILY PRACTICE  - methocarbamol (ROBAXIN) 500 MG Tab; Take 1 Tab by mouth at bedtime as needed for up to 10 days.  Dispense: 10 Tab; Refill: 0    3. Paresthesia  - DX-CERVICAL SPINE-4+ VIEWS; Future  - diclofenac EC (VOLTAREN) 75 MG Tablet Delayed Response; Take 1 Tab by mouth 2 times daily with meals as needed for up to 10 days.  Dispense: 20 Tab;  Refill: 0  - REFERRAL TO ORTHOPEDICS  - REFERRAL TO FAMILY PRACTICE    4. Cervical spondylosis  - diclofenac EC (VOLTAREN) 75 MG Tablet Delayed Response; Take 1 Tab by mouth 2 times daily with meals as needed for up to 10 days.  Dispense: 20 Tab; Refill: 0  - REFERRAL TO ORTHOPEDICS  - REFERRAL TO FAMILY PRACTICE    5. Compression deformity of vertebra  - REFERRAL TO ORTHOPEDICS  - REFERRAL TO FAMILY PRACTICE      We discussed that his upper mid back pain is clearly musculoskeletal.  He received Toradol IM and also given diclofenac twice daily as needed for use including possible muscle at night only.  In regards to his intermittent numbness in both upper extremities, that comes and goes at night and with movement, I believe it is more related to his neck rather than carpal tunnel although his Tinel test was somewhat positive only on one finger.  I think he would benefit from a formal referral to orthopedics, in this case [facility] where he would like to go.  He does not have a primary care doctor, advised him to contact our main number and a referral was placed for primary so he can have a follow-up appointment in the next few weeks to discuss everything else with discussed today in follow-up on all of above  Otherwise no neurologic deficit, and neuro exam was benign.  `

## 2020-03-23 ENCOUNTER — APPOINTMENT (OUTPATIENT)
Dept: RADIOLOGY | Facility: IMAGING CENTER | Age: 40
End: 2020-03-23
Attending: INTERNAL MEDICINE
Payer: COMMERCIAL

## 2020-03-23 ENCOUNTER — OFFICE VISIT (OUTPATIENT)
Dept: URGENT CARE | Facility: PHYSICIAN GROUP | Age: 40
End: 2020-03-23
Payer: COMMERCIAL

## 2020-03-23 VITALS
WEIGHT: 268 LBS | RESPIRATION RATE: 16 BRPM | DIASTOLIC BLOOD PRESSURE: 84 MMHG | BODY MASS INDEX: 40.75 KG/M2 | OXYGEN SATURATION: 97 % | SYSTOLIC BLOOD PRESSURE: 124 MMHG | TEMPERATURE: 97.5 F | HEART RATE: 78 BPM

## 2020-03-23 DIAGNOSIS — J22 LOWER RESPIRATORY INFECTION (E.G., BRONCHITIS, PNEUMONIA, PNEUMONITIS, PULMONITIS): ICD-10-CM

## 2020-03-23 PROCEDURE — 99204 OFFICE O/P NEW MOD 45 MIN: CPT | Mod: CR | Performed by: INTERNAL MEDICINE

## 2020-03-23 PROCEDURE — 71046 X-RAY EXAM CHEST 2 VIEWS: CPT | Mod: TC,FY | Performed by: INTERNAL MEDICINE

## 2020-03-23 RX ORDER — AZITHROMYCIN 250 MG/1
TABLET, FILM COATED ORAL
Qty: 6 TAB | Refills: 0 | Status: SHIPPED | OUTPATIENT
Start: 2020-03-23 | End: 2020-05-26

## 2020-03-23 RX ORDER — ALBUTEROL SULFATE 90 UG/1
2 AEROSOL, METERED RESPIRATORY (INHALATION) EVERY 6 HOURS PRN
Qty: 1 INHALER | Refills: 0 | Status: SHIPPED | OUTPATIENT
Start: 2020-03-23 | End: 2020-05-26

## 2020-03-23 ASSESSMENT — ENCOUNTER SYMPTOMS
FEVER: 0
WHEEZING: 1
SHORTNESS OF BREATH: 1
SORE THROAT: 1
COUGH: 1
RHINORRHEA: 1

## 2020-03-23 ASSESSMENT — FIBROSIS 4 INDEX: FIB4 SCORE: 0.61

## 2020-03-23 NOTE — PROGRESS NOTES
Subjective:     Bharath Diaz is a 39 y.o. male who presents for Cough (raspy x3d)       Cough   This is a new problem. The current episode started in the past 7 days. The problem has been gradually worsening. The problem occurs constantly. The cough is productive of sputum. Associated symptoms include nasal congestion, rhinorrhea, a sore throat, shortness of breath and wheezing. Pertinent negatives include no fever.   History reviewed. No pertinent past medical history.  Past Surgical History:   Procedure Laterality Date   • EYE LACERATION REPAIR Right 3/14/2018    Procedure: EYE LACERATION REPAIR-EYE LID REPAIR;  Surgeon: Mariann Avalos M.D.;  Location: SURGERY SAME DAY [facility];  Service: Ent     Social History     Socioeconomic History   • Marital status:      Spouse name: Not on file   • Number of children: Not on file   • Years of education: Not on file   • Highest education level: Not on file   Occupational History   • Not on file   Social Needs   • Financial resource strain: Not on file   • Food insecurity     Worry: Not on file     Inability: Not on file   • Transportation needs     Medical: Not on file     Non-medical: Not on file   Tobacco Use   • Smoking status: Former Smoker     Packs/day: 0.00     Years: 18.00     Pack years: 0.00     Types: Cigars   • Smokeless tobacco: Never Used   • Tobacco comment: 1 cigar a day    Substance and Sexual Activity   • Alcohol use: Yes     Comment: Today   • Drug use: Yes     Types: Inhaled, Marijuana     Comment: hasnt smoked in over one year   • Sexual activity: Not on file   Lifestyle   • Physical activity     Days per week: Not on file     Minutes per session: Not on file   • Stress: Not on file   Relationships   • Social connections     Talks on phone: Not on file     Gets together: Not on file     Attends Gnosticist service: Not on file     Active member of club or organization: Not on file     Attends meetings of clubs or organizations: Not on  file     Relationship status: Not on file   • Intimate partner violence     Fear of current or ex partner: Not on file     Emotionally abused: Not on file     Physically abused: Not on file     Forced sexual activity: Not on file   Other Topics Concern   • Not on file   Social History Narrative    ** Merged History Encounter **           Family History   Problem Relation Age of Onset   • Thyroid Mother    • Heart Disease Mother    • Other Father 37        liver failure    Review of Systems   Constitutional: Negative for fever.   HENT: Positive for rhinorrhea and sore throat.    Respiratory: Positive for cough, shortness of breath and wheezing.    All other systems reviewed and are negative.  No Known Allergies   Objective:   /84   Pulse 78   Temp 36.4 °C (97.5 °F) (Temporal)   Resp 16   Wt 121.6 kg (268 lb)   SpO2 97%   BMI 40.75 kg/m²   Physical Exam  Constitutional:       General: He is not in acute distress.     Appearance: He is well-developed.   HENT:      Head: Normocephalic and atraumatic.      Right Ear: Tympanic membrane, ear canal and external ear normal.      Left Ear: Tympanic membrane, ear canal and external ear normal.      Nose: Mucosal edema and rhinorrhea present.      Mouth/Throat:      Mouth: Mucous membranes are moist.      Pharynx: Oropharynx is clear. Posterior oropharyngeal erythema present.   Eyes:      Conjunctiva/sclera: Conjunctivae normal.   Neck:      Musculoskeletal: No neck rigidity.   Cardiovascular:      Rate and Rhythm: Normal rate and regular rhythm.   Pulmonary:      Effort: Pulmonary effort is normal. No respiratory distress.      Breath sounds: Wheezing present.   Lymphadenopathy:      Cervical: No cervical adenopathy.   Skin:     General: Skin is warm and dry.      Capillary Refill: Capillary refill takes less than 2 seconds.   Neurological:      Mental Status: He is alert and oriented to person, place, and time.      Sensory: No sensory deficit.      Deep Tendon  Reflexes: Reflexes are normal and symmetric.   Psychiatric:         Mood and Affect: Mood normal.         Behavior: Behavior normal.           Assessment/Plan:   Assessment    1. Lower respiratory infection (e.g., bronchitis, pneumonia, pneumonitis, pulmonitis)  - albuterol 108 (90 Base) MCG/ACT Aero Soln inhalation aerosol; Inhale 2 Puffs by mouth every 6 hours as needed for Shortness of Breath.  Dispense: 1 Inhaler; Refill: 0  - DX-CHEST-2 VIEWS; Future  - azithromycin (ZITHROMAX) 250 MG Tab; 2 tab on day one and 1 tab for next 4 days  Dispense: 6 Tab; Refill: 0    Advised patient to contact the health department to get coronavirus testing and if shortness of breath gets worse to go to the ER    High risk conditions including coronavirus, pneumonia, flu was considered in the differential diagnosis      Differential diagnosis, natural history, supportive care, and indications for immediate follow-up discussed.

## 2020-03-25 ENCOUNTER — TELEPHONE (OUTPATIENT)
Dept: URGENT CARE | Facility: PHYSICIAN GROUP | Age: 40
End: 2020-03-25

## 2020-03-26 NOTE — TELEPHONE ENCOUNTER
Pt called asking for a note stating that he was seen here at urgent care and that we told him that he needs to self quarantine until further testing - the [facility] district will be doing the covid 19 test

## 2020-04-03 ENCOUNTER — OFFICE VISIT (OUTPATIENT)
Dept: URGENT CARE | Facility: PHYSICIAN GROUP | Age: 40
End: 2020-04-03
Payer: COMMERCIAL

## 2020-04-03 VITALS
HEART RATE: 98 BPM | RESPIRATION RATE: 16 BRPM | SYSTOLIC BLOOD PRESSURE: 116 MMHG | BODY MASS INDEX: 38.95 KG/M2 | TEMPERATURE: 97.8 F | DIASTOLIC BLOOD PRESSURE: 80 MMHG | OXYGEN SATURATION: 93 % | HEIGHT: 68 IN | WEIGHT: 257 LBS

## 2020-04-03 DIAGNOSIS — J22 LOWER RESPIRATORY INFECTION (E.G., BRONCHITIS, PNEUMONIA, PNEUMONITIS, PULMONITIS): ICD-10-CM

## 2020-04-03 PROCEDURE — 99213 OFFICE O/P EST LOW 20 MIN: CPT | Performed by: FAMILY MEDICINE

## 2020-04-03 ASSESSMENT — ENCOUNTER SYMPTOMS
FEVER: 0
SORE THROAT: 0
VOMITING: 0
SHORTNESS OF BREATH: 0

## 2020-04-03 ASSESSMENT — FIBROSIS 4 INDEX: FIB4 SCORE: 0.61

## 2020-04-03 NOTE — PROGRESS NOTES
"Subjective:     Bharath Diaz is a 39 y.o. male who presents for Letter for School/Work (Seeking return to work note.)    HPI  Pt presents for follow-up cough   Patient initially evaluated on 3/23 for cough and diagnosed with lower respiratory infection  He was treated with a azithromycin as well as albuterol  He was advised to self quarantine for 14 days which he has done  Feels fully recovered   No new complaints today     Review of Systems   Constitutional: Negative for fever.   HENT: Negative for sore throat.    Respiratory: Negative for shortness of breath.    Cardiovascular: Negative for chest pain.   Gastrointestinal: Negative for vomiting.   Skin: Negative for rash.     PMH: No hx of asthma   MEDS:   Current Outpatient Medications:   •  albuterol 108 (90 Base) MCG/ACT Aero Soln inhalation aerosol, Inhale 2 Puffs by mouth every 6 hours as needed for Shortness of Breath., Disp: 1 Inhaler, Rfl: 0  •  azithromycin (ZITHROMAX) 250 MG Tab, 2 tab on day one and 1 tab for next 4 days, Disp: 6 Tab, Rfl: 0  ALLERGIES: No Known Allergies  SURGHX:   Past Surgical History:   Procedure Laterality Date   • EYE LACERATION REPAIR Right 3/14/2018    Procedure: EYE LACERATION REPAIR-EYE LID REPAIR;  Surgeon: Mariann Avalos M.D.;  Location: SURGERY SAME DAY [facility];  Service: Ent     SOCHX:  reports that he has quit smoking. His smoking use included cigars. He smoked 0.00 packs per day for 18.00 years. He has never used smokeless tobacco. He reports current alcohol use. He reports current drug use. Drugs: Inhaled and Marijuana.  FH: Family history was reviewed, not contributing to acute illness     Objective:   /80   Pulse 98   Temp 36.6 °C (97.8 °F) (Temporal)   Resp 16   Ht 1.727 m (5' 8\")   Wt 116.6 kg (257 lb)   SpO2 93%   BMI 39.08 kg/m²     Physical Exam  Constitutional:       General: He is not in acute distress.     Appearance: He is well-developed. He is not diaphoretic.   HENT:      Head: " Normocephalic and atraumatic.      Right Ear: External ear normal.      Left Ear: External ear normal.      Nose: Nose normal.      Mouth/Throat:      Mouth: Mucous membranes are moist.      Pharynx: Oropharynx is clear. No oropharyngeal exudate or posterior oropharyngeal erythema.   Eyes:      General: No scleral icterus.        Right eye: No discharge.         Left eye: No discharge.      Conjunctiva/sclera: Conjunctivae normal.      Pupils: Pupils are equal, round, and reactive to light.   Neck:      Musculoskeletal: Normal range of motion.      Trachea: No tracheal deviation.   Cardiovascular:      Rate and Rhythm: Normal rate and regular rhythm.      Heart sounds: Normal heart sounds.   Pulmonary:      Effort: Pulmonary effort is normal. No respiratory distress.      Breath sounds: Normal breath sounds. No wheezing or rales.   Musculoskeletal: Normal range of motion.   Skin:     General: Skin is warm and dry.      Findings: No rash.   Neurological:      Mental Status: He is alert.   Psychiatric:         Mood and Affect: Mood normal.         Behavior: Behavior normal.         Thought Content: Thought content normal.         Judgment: Judgment normal.       Assessment/Plan:   Assessment    1. Lower respiratory infection (e.g., bronchitis, pneumonia, pneumonitis, pulmonitis)    Pt has made great recovery.  Done with 14 day quarantine.  May return to work.

## 2020-04-03 NOTE — LETTER
April 3, 2020      To Whom It May Concern:           This is confirmation that Bharath Joe attended his scheduled appointment with Charles Bob M.D. on 4/03/20.  He is cleared to return to work without restriction starting tomorrow.             If you have any questions please do not hesitate to call me at the phone number listed below.      Sincerely,          Charles Bob M.D.  [phone]

## 2020-04-30 ENCOUNTER — TELEPHONE (OUTPATIENT)
Dept: SCHEDULING | Facility: IMAGING CENTER | Age: 40
End: 2020-04-30

## 2020-05-19 ENCOUNTER — TELEPHONE (OUTPATIENT)
Dept: MEDICAL GROUP | Facility: PHYSICIAN GROUP | Age: 40
End: 2020-05-19

## 2020-05-26 ENCOUNTER — HOSPITAL ENCOUNTER (OUTPATIENT)
Dept: LAB | Facility: MEDICAL CENTER | Age: 40
End: 2020-05-26
Attending: FAMILY MEDICINE
Payer: COMMERCIAL

## 2020-05-26 ENCOUNTER — OFFICE VISIT (OUTPATIENT)
Dept: MEDICAL GROUP | Facility: MEDICAL CENTER | Age: 40
End: 2020-05-26
Payer: COMMERCIAL

## 2020-05-26 VITALS
TEMPERATURE: 97.8 F | BODY MASS INDEX: 41.07 KG/M2 | DIASTOLIC BLOOD PRESSURE: 80 MMHG | WEIGHT: 271 LBS | OXYGEN SATURATION: 95 % | SYSTOLIC BLOOD PRESSURE: 132 MMHG | HEIGHT: 68 IN | HEART RATE: 99 BPM

## 2020-05-26 DIAGNOSIS — R73.03 PREDIABETES: ICD-10-CM

## 2020-05-26 DIAGNOSIS — E78.1 HYPERTRIGLYCERIDEMIA: ICD-10-CM

## 2020-05-26 DIAGNOSIS — N64.59 ABNORMAL BREAST TISSUE: ICD-10-CM

## 2020-05-26 DIAGNOSIS — Z72.0 TOBACCO ABUSE: ICD-10-CM

## 2020-05-26 DIAGNOSIS — E66.9 OBESITY (BMI 35.0-39.9 WITHOUT COMORBIDITY): ICD-10-CM

## 2020-05-26 DIAGNOSIS — R68.82 DECREASED LIBIDO: ICD-10-CM

## 2020-05-26 DIAGNOSIS — F10.10 ALCOHOL ABUSE: ICD-10-CM

## 2020-05-26 DIAGNOSIS — N52.9 ERECTILE DYSFUNCTION, UNSPECIFIED ERECTILE DYSFUNCTION TYPE: ICD-10-CM

## 2020-05-26 PROBLEM — J45.909 ASTHMA: Status: RESOLVED | Noted: 2018-09-07 | Resolved: 2020-05-26

## 2020-05-26 LAB
ALBUMIN SERPL BCP-MCNC: 4.2 G/DL (ref 3.2–4.9)
ALBUMIN/GLOB SERPL: 1.5 G/DL
ALP SERPL-CCNC: 41 U/L (ref 30–99)
ALT SERPL-CCNC: 26 U/L (ref 2–50)
ANION GAP SERPL CALC-SCNC: 8 MMOL/L (ref 7–16)
AST SERPL-CCNC: 19 U/L (ref 12–45)
BILIRUB SERPL-MCNC: 0.3 MG/DL (ref 0.1–1.5)
BUN SERPL-MCNC: 11 MG/DL (ref 8–22)
CALCIUM SERPL-MCNC: 8.9 MG/DL (ref 8.5–10.5)
CHLORIDE SERPL-SCNC: 106 MMOL/L (ref 96–112)
CHOLEST SERPL-MCNC: 251 MG/DL (ref 100–199)
CO2 SERPL-SCNC: 25 MMOL/L (ref 20–33)
CREAT SERPL-MCNC: 0.75 MG/DL (ref 0.5–1.4)
FASTING STATUS PATIENT QL REPORTED: NORMAL
GLOBULIN SER CALC-MCNC: 2.8 G/DL (ref 1.9–3.5)
GLUCOSE SERPL-MCNC: 110 MG/DL (ref 65–99)
HDLC SERPL-MCNC: 48 MG/DL
LDLC SERPL CALC-MCNC: 179 MG/DL
POTASSIUM SERPL-SCNC: 4.4 MMOL/L (ref 3.6–5.5)
PROT SERPL-MCNC: 7 G/DL (ref 6–8.2)
SODIUM SERPL-SCNC: 139 MMOL/L (ref 135–145)
TESTOST SERPL-MCNC: 168 NG/DL (ref 175–781)
TRIGL SERPL-MCNC: 119 MG/DL (ref 0–149)
TSH SERPL DL<=0.005 MIU/L-ACNC: 1.58 UIU/ML (ref 0.38–5.33)

## 2020-05-26 PROCEDURE — 83036 HEMOGLOBIN GLYCOSYLATED A1C: CPT

## 2020-05-26 PROCEDURE — 84443 ASSAY THYROID STIM HORMONE: CPT

## 2020-05-26 PROCEDURE — 80053 COMPREHEN METABOLIC PANEL: CPT

## 2020-05-26 PROCEDURE — 99214 OFFICE O/P EST MOD 30 MIN: CPT | Performed by: FAMILY MEDICINE

## 2020-05-26 PROCEDURE — 80061 LIPID PANEL: CPT

## 2020-05-26 PROCEDURE — 36415 COLL VENOUS BLD VENIPUNCTURE: CPT

## 2020-05-26 PROCEDURE — 84403 ASSAY OF TOTAL TESTOSTERONE: CPT

## 2020-05-26 SDOH — HEALTH STABILITY: MENTAL HEALTH: HOW OFTEN DO YOU HAVE A DRINK CONTAINING ALCOHOL?: 2-3 TIMES A WEEK

## 2020-05-26 ASSESSMENT — PATIENT HEALTH QUESTIONNAIRE - PHQ9: CLINICAL INTERPRETATION OF PHQ2 SCORE: 0

## 2020-05-26 ASSESSMENT — FIBROSIS 4 INDEX: FIB4 SCORE: 0.61

## 2020-05-26 NOTE — ASSESSMENT & PLAN NOTE
Chronic problem. Last lipid panel in Sept 2018. Triglycerides elevated to 211.   Had lost weight, now gaining again.   BMI 41

## 2020-05-27 LAB
EST. AVERAGE GLUCOSE BLD GHB EST-MCNC: 114 MG/DL
HBA1C MFR BLD: 5.6 % (ref 0–5.6)

## 2020-05-27 NOTE — PROGRESS NOTES
Subjective:     CC: Diagnoses of Hypertriglyceridemia, Prediabetes, Tobacco abuse, Obesity (BMI 35.0-39.9 without comorbidity), Alcohol abuse, Abnormal breast tissue, Decreased libido, and Erectile dysfunction, unspecified erectile dysfunction type were pertinent to this visit.    HPI: Patient is a 39 y.o. male established patient who presents today to establish care.      Hypertriglyceridemia  Chronic problem. Last lipid panel in Sept 2018. Triglycerides elevated to 211.   Had lost weight, now gaining again.   BMI 41      Prediabetes  Chronic problem. Last A1C 5.8% in Sept 2018.   Reports a healthier diet now.     Tobacco abuse  Quit smoking in 2018, now vaping.     Obesity (BMI 35.0-39.9 without comorbidity)  Chronic problem.  Reports attempting to work on diet and exercise.     Alcohol abuse  Drinks 2-3 beers 3-4 times per week.  The patient states he did drink more heavily in the past, has now cut down.    Concerns regarding testosterone  The patient reports that he has had gynecomastia since he was around 11 or 12 years old.  He also reports decreased libido.  He has fathered no children, although states he has never truly attempted nor has he ever been seen by an infertility specialist.  The patient would like his testosterone checked.    History reviewed. No pertinent past medical history.    Social History     Tobacco Use   • Smoking status: Former Smoker     Packs/day: 0.00     Years: 18.00     Pack years: 0.00     Types: Cigars   • Smokeless tobacco: Never Used   • Tobacco comment: 1 cigar a day    Substance Use Topics   • Alcohol use: Yes     Frequency: 2-3 times a week     Comment: Today   • Drug use: Yes     Types: Inhaled, Marijuana     Comment: hasnt smoked in over one year       No current Epic-ordered outpatient medications on file.     No current [facility]-ordered facility-administered medications on file.        Allergies:  Patient has no known allergies.    Health Maintenance: Completed    ROS:  Pulm:  "no sob, no cough  CV: no chest pain, no palpitations      Objective:       Exam:  /80 (BP Location: Left arm, Patient Position: Sitting, BP Cuff Size: Adult long)   Pulse 99   Temp 36.6 °C (97.8 °F) (Temporal)   Ht 1.727 m (5' 8\")   Wt 122.9 kg (271 lb)   SpO2 95%   BMI 41.21 kg/m²  Body mass index is 41.21 kg/m².      General: Normal appearing. No distress.  HEAD: NCAT  EYES: conjunctiva clear, lids without ptosis, pupils equal  and reactive to light  EARS: ears normal shape and contour, canals are clear bilaterally, TMs clear  MOUTH: oropharynx is without erythema, edema or exudates.   Neck: Supple without masses. Thyroid is not enlarged. Normal ROM  Pulmonary: Clear to ausculation.  Normal effort. No rales, ronchi, or wheezing.  Cardiovascular: Regular rate and rhythm, no murmur. No LE edema  Neurologic: Grossly normal, no focal deficits  Lymph: No cervical or supraclavicular lymph nodes are palpable  Skin: Warm and dry.  No obvious lesions.  Musculoskeletal: Normal gait and station.   Psych: Normal mood and affect. Alert and oriented x3. Judgment and insight is normal.    Labs: 9/8/2018 results reviewed and discussed with the patient, questions answered.    Assessment & Plan:     39 y.o. male with the following -     1. Hypertriglyceridemia  Chronic problem, not on any medications.  Last triglyceride level was 211 back in 2018.  Repeat lipid panel ordered.  - Lipid Profile; Future    2. Prediabetes  Chronic problem, discussed diet and exercise today.  Repeat A1c ordered to be done with labs.  - Comp Metabolic Panel; Future  - Lipid Profile; Future  - HEMOGLOBIN A1C; Future    3. Tobacco abuse  Problem, continues to vape although did quit smoking back in 2018.    4. Obesity (BMI 35.0-39.9 without comorbidity)  Chronic problem, discussed diet and exercise at length today.  Order the following labs.  Plan to follow-up in 1 month.  - Comp Metabolic Panel; Future  - HEMOGLOBIN A1C; Future    5. Alcohol " abuse  New problem, the patient now reports that he drinks 2-3 beers 3-4 times per week.  Has cut down significantly.    6. Abnormal breast tissue  - TESTOSTERONE SERUM; Future  - TSH WITH REFLEX TO FT4; Future    7. Decreased libido  New problem  - TESTOSTERONE SERUM; Future  - TSH WITH REFLEX TO FT4; Future    8. Erectile dysfunction, unspecified erectile dysfunction type  New problem  - TESTOSTERONE SERUM; Future      No follow-ups on file.    Please note that this dictation was created using voice recognition software. I have made every reasonable attempt to correct obvious errors, but I expect that there are errors of grammar and possibly content that I did not discover before finalizing the note.

## 2020-06-26 ENCOUNTER — APPOINTMENT (OUTPATIENT)
Dept: MEDICAL GROUP | Facility: MEDICAL CENTER | Age: 40
End: 2020-06-26
Payer: COMMERCIAL

## 2020-07-13 ENCOUNTER — OFFICE VISIT (OUTPATIENT)
Dept: MEDICAL GROUP | Facility: MEDICAL CENTER | Age: 40
End: 2020-07-13
Payer: COMMERCIAL

## 2020-07-13 VITALS
TEMPERATURE: 97.2 F | SYSTOLIC BLOOD PRESSURE: 134 MMHG | DIASTOLIC BLOOD PRESSURE: 74 MMHG | HEART RATE: 99 BPM | OXYGEN SATURATION: 96 % | BODY MASS INDEX: 41.68 KG/M2 | HEIGHT: 68 IN | WEIGHT: 275 LBS

## 2020-07-13 DIAGNOSIS — E29.1 HYPOGONADISM IN MALE: ICD-10-CM

## 2020-07-13 DIAGNOSIS — R73.03 PREDIABETES: ICD-10-CM

## 2020-07-13 DIAGNOSIS — E78.2 MIXED HYPERLIPIDEMIA: ICD-10-CM

## 2020-07-13 PROCEDURE — 99214 OFFICE O/P EST MOD 30 MIN: CPT | Performed by: FAMILY MEDICINE

## 2020-07-13 RX ORDER — TESTOSTERONE CYPIONATE 200 MG/ML
100 INJECTION, SOLUTION INTRAMUSCULAR ONCE
Status: COMPLETED | OUTPATIENT
Start: 2020-07-13 | End: 2020-07-13

## 2020-07-13 RX ADMIN — TESTOSTERONE CYPIONATE 100 MG: 200 INJECTION, SOLUTION INTRAMUSCULAR at 15:26

## 2020-07-13 ASSESSMENT — FIBROSIS 4 INDEX: FIB4 SCORE: 0.72

## 2020-07-14 PROBLEM — E78.2 MIXED HYPERLIPIDEMIA: Status: ACTIVE | Noted: 2018-09-12

## 2020-07-14 PROBLEM — E29.1 HYPOGONADISM IN MALE: Status: ACTIVE | Noted: 2020-07-14

## 2020-07-14 NOTE — ASSESSMENT & PLAN NOTE
New problem. The patient reports fatigue, decreased libido and difficulty building muscle or losing weight. His testosterone is low at

## 2020-07-14 NOTE — PROGRESS NOTES
"Subjective:     CC: Diagnoses of Hypogonadism in male, Mixed hyperlipidemia, and Prediabetes were pertinent to this visit.    HPI: Patient is a 39 y.o. male established patient who presents today to follow-up on labs.      Hypogonadism in male  New problem. The patient reports fatigue, decreased libido and difficulty building muscle or losing weight. His testosterone is low at     Hyperlipidemia  New problem.  LDL severely elevated at 179.  HDL 48, triglycerides 119, total cholesterol 251.  The patient reports a somewhat healthy diet, states he does not eat large amounts, does not eat fast food.  ASCVD risk is low.    Elevated fasting blood sugar  New problem.  Fasting blood sugar 110.  The patient has gained significant weight over the past year.      Social History     Tobacco Use   • Smoking status: Former Smoker     Packs/day: 0.00     Years: 18.00     Pack years: 0.00     Types: Cigars   • Smokeless tobacco: Never Used   • Tobacco comment: 1 cigar a day    Substance Use Topics   • Alcohol use: Yes     Frequency: 2-3 times a week     Comment: Today   • Drug use: Yes     Types: Inhaled, Marijuana     Comment: hasnt smoked in over one year       No current Epic-ordered outpatient medications on file.     No current [facility]-ordered facility-administered medications on file.        Allergies:  Patient has no known allergies.    Health Maintenance: Completed    ROS:  Pulm: no sob, no cough  CV: no chest pain, no palpitations      Objective:       Exam:  /74 (BP Location: Left arm, Patient Position: Sitting, BP Cuff Size: Adult long)   Pulse 99   Temp 36.2 °C (97.2 °F) (Temporal)   Ht 1.727 m (5' 8\")   Wt 124.7 kg (275 lb)   SpO2 96%   BMI 41.81 kg/m²  Body mass index is 41.81 kg/m².    General: Normal appearing. No distress.  HEAD: NCAT  EYES: conjunctiva clear, lids without ptosis, pupils equal and reactive to light  EARS: ears normal shape and contour.  MOUTH: normal dentition   Neck:  Normal " ROM  Pulmonary: Normal effort. Normal respiratory rate.  Cardiovascular: Well perfused. No LE edema  Neurologic: Grossly normal, no focal deficits  Skin: Warm and dry.  No obvious lesions.  Musculoskeletal: Normal gait and station.   Psych: Normal mood and affect. Alert and oriented x3. Judgment and insight is normal.    Labs: 2620 results reviewed and discussed with the patient, questions answered.    Assessment & Plan:     39 y.o. male with the following -     1. Hypogonadism in male  New problem.  Testosterone low.  The patient does have symptoms of fatigue, low libido and is interested in trying Depo testosterone.  100 mg injected today.  Repeat testosterone level ordered for 1 week out.  Plan to follow-up in 2 weeks.  - TESTOSTERONE SERUM; Future  - testosterone cypionate (DEPO-TESTOSTERONE) injection 100 mg    2. Mixed hyperlipidemia  Chronic problem.  ASCVD risk is low.  Plan to continue off statin.  Discussed diet exercise at length today.    3. Prediabetes  Chronic problem, uncontrolled.  Fasting blood sugar 110.  Discussed diet and exercise.      Return in about 2 weeks (around 7/27/2020).    Please note that this dictation was created using voice recognition software. I have made every reasonable attempt to correct obvious errors, but I expect that there are errors of grammar and possibly content that I did not discover before finalizing the note.

## 2020-09-08 ENCOUNTER — HOSPITAL ENCOUNTER (OUTPATIENT)
Dept: LAB | Facility: MEDICAL CENTER | Age: 40
End: 2020-09-08
Attending: FAMILY MEDICINE
Payer: COMMERCIAL

## 2020-09-08 DIAGNOSIS — E29.1 HYPOGONADISM IN MALE: ICD-10-CM

## 2020-09-08 LAB — TESTOST SERPL-MCNC: 159 NG/DL (ref 175–781)

## 2020-09-08 PROCEDURE — 36415 COLL VENOUS BLD VENIPUNCTURE: CPT

## 2020-09-08 PROCEDURE — 84403 ASSAY OF TOTAL TESTOSTERONE: CPT

## 2020-09-11 ENCOUNTER — TELEPHONE (OUTPATIENT)
Dept: MEDICAL GROUP | Facility: MEDICAL CENTER | Age: 40
End: 2020-09-11

## 2020-09-11 NOTE — TELEPHONE ENCOUNTER
----- Message from Allison Cuenca M.D. sent at 9/10/2020  7:11 AM PDT -----  Hi,   Can you please call the patient to let him know that his testerone remains low and I would like to discuss this with him. Can you please schedule him an appt?  Thanks,   Dr. Cuenca

## 2020-09-11 NOTE — TELEPHONE ENCOUNTER
Phone Number Called: [phone] (home)     Call outcome: Left detailed message for patient. Informed to call back with any additional questions.    Message: Informing pt that Dr. Cuenca would like him to schedule an faye't to discuss lab results.

## 2020-09-15 ENCOUNTER — OFFICE VISIT (OUTPATIENT)
Dept: MEDICAL GROUP | Facility: MEDICAL CENTER | Age: 40
End: 2020-09-15
Payer: COMMERCIAL

## 2020-09-15 VITALS
HEART RATE: 89 BPM | BODY MASS INDEX: 40.47 KG/M2 | SYSTOLIC BLOOD PRESSURE: 140 MMHG | OXYGEN SATURATION: 96 % | WEIGHT: 267 LBS | DIASTOLIC BLOOD PRESSURE: 78 MMHG | HEIGHT: 68 IN | TEMPERATURE: 97.8 F

## 2020-09-15 DIAGNOSIS — E29.1 HYPOGONADISM IN MALE: ICD-10-CM

## 2020-09-15 DIAGNOSIS — R73.03 PREDIABETES: ICD-10-CM

## 2020-09-15 PROCEDURE — 99214 OFFICE O/P EST MOD 30 MIN: CPT | Performed by: FAMILY MEDICINE

## 2020-09-15 RX ORDER — TESTOSTERONE GEL, 1% 10 MG/G
100 GEL TRANSDERMAL DAILY
Qty: 3 G | Refills: 2 | Status: SHIPPED | OUTPATIENT
Start: 2020-09-15 | End: 2020-09-15 | Stop reason: SDUPTHER

## 2020-09-15 RX ORDER — TESTOSTERONE GEL, 1% 10 MG/G
100 GEL TRANSDERMAL DAILY
Qty: 3 G | Refills: 2 | Status: SHIPPED | OUTPATIENT
Start: 2020-09-15 | End: 2020-10-26 | Stop reason: SDUPTHER

## 2020-09-15 NOTE — PROGRESS NOTES
"Subjective:     CC: Diagnoses of Hypogonadism in male and Prediabetes were pertinent to this visit.    HPI: Patient is a 39 y.o. male established patient who presents today to f/u on hypogonadism.       Hypogonadism in male  Chronic problem. He did note some improvement with the testosterone injection. He did not get his testosterone lab done until about 6 weeks later so lab looks unchanged. Still low at 159.    Prediabetes  Chronic problem. Working on weight loss and dietary changes.       History reviewed. No pertinent past medical history.    Social History     Tobacco Use   • Smoking status: Former Smoker     Packs/day: 0.00     Years: 18.00     Pack years: 0.00     Types: Cigars   • Smokeless tobacco: Never Used   • Tobacco comment: 1 cigar a day    Substance Use Topics   • Alcohol use: Yes     Frequency: 2-3 times a week     Comment: Today   • Drug use: Yes     Types: Inhaled, Marijuana     Comment: hasnt smoked in over one year       Current Outpatient Medications Ordered in Epic   Medication Sig Dispense Refill   • testosterone (TESTIM/ANDROGEL) 50 MG/5GM (1%) Gel gel Apply 100 mg to skin as directed every day for 30 days. 3 g 2     No current Epic-ordered facility-administered medications on file.        Allergies:  Patient has no known allergies.    Health Maintenance: Completed    ROS:  Gen: no fevers/chill, no changes in weight  Eyes: no changes in vision  ENT: no sore throat, no hearing loss, no bloody nose  Pulm: no sob, no cough  CV: no chest pain, no palpitations  GI: no nausea/vomiting, no diarrhea  : no dysuria  MSk: no myalgias  Skin: no rash  Neuro: no headaches, no numbness/tingling  Heme/Lymph: no easy bruising      Objective:       Exam:  /78 (BP Location: Left arm, Patient Position: Sitting, BP Cuff Size: Adult long)   Pulse 89   Temp 36.6 °C (97.8 °F) (Temporal)   Ht 1.727 m (5' 8\")   Wt 121.1 kg (267 lb)   SpO2 96%   BMI 40.60 kg/m²  Body mass index is 40.6 kg/m².    General: " Normal appearing. No distress.  HEAD: NCAT  EYES: conjunctiva clear, lids without ptosis, pupils equal and reactive to light  EARS: ears normal shape and contour.  MOUTH: normal dentition   Neck:  Normal ROM  Pulmonary: Normal effort. Normal respiratory rate.  Cardiovascular: Well perfused. No LE edema  Neurologic: Grossly normal, no focal deficits  Skin: Warm and dry.  No obvious lesions.  Musculoskeletal: Normal gait and station.   Psych: Normal mood and affect. Alert and oriented x3. Judgment and insight is normal.    Labs: 9/8/2020 results reviewed and discussed with the patient, questions answered.    Assessment & Plan:     39 y.o. male with the following -     1. Hypogonadism in male  Chronic problem, uncontrolled. Plan to start transdermal testosterone. Repeat testosterone ordered for 1 month out.   - TESTOSTERONE SERUM; Future  - CBC WITH DIFFERENTIAL; Future  - testosterone (TESTIM/ANDROGEL) 50 MG/5GM (1%) Gel gel; Apply 100 mg to skin as directed every day for 30 days.  Dispense: 3 g; Refill: 2    2. Prediabetes  Chronic problem. Discussed diet and exercise. Plan to f/u 3 months     Return in about 4 weeks (around 10/13/2020).    Please note that this dictation was created using voice recognition software. I have made every reasonable attempt to correct obvious errors, but I expect that there are errors of grammar and possibly content that I did not discover before finalizing the note.

## 2020-10-08 ENCOUNTER — HOSPITAL ENCOUNTER (OUTPATIENT)
Dept: LAB | Facility: MEDICAL CENTER | Age: 40
End: 2020-10-08
Attending: FAMILY MEDICINE
Payer: COMMERCIAL

## 2020-10-08 DIAGNOSIS — E29.1 HYPOGONADISM IN MALE: ICD-10-CM

## 2020-10-08 LAB
BASOPHILS # BLD AUTO: 0.4 % (ref 0–1.8)
BASOPHILS # BLD: 0.03 K/UL (ref 0–0.12)
EOSINOPHIL # BLD AUTO: 0.01 K/UL (ref 0–0.51)
EOSINOPHIL NFR BLD: 0.1 % (ref 0–6.9)
ERYTHROCYTE [DISTWIDTH] IN BLOOD BY AUTOMATED COUNT: 49 FL (ref 35.9–50)
HCT VFR BLD AUTO: 47.3 % (ref 42–52)
HGB BLD-MCNC: 15.3 G/DL (ref 14–18)
IMM GRANULOCYTES # BLD AUTO: 0.02 K/UL (ref 0–0.11)
IMM GRANULOCYTES NFR BLD AUTO: 0.3 % (ref 0–0.9)
LYMPHOCYTES # BLD AUTO: 1.7 K/UL (ref 1–4.8)
LYMPHOCYTES NFR BLD: 23.7 % (ref 22–41)
MCH RBC QN AUTO: 30.2 PG (ref 27–33)
MCHC RBC AUTO-ENTMCNC: 32.3 G/DL (ref 33.7–35.3)
MCV RBC AUTO: 93.3 FL (ref 81.4–97.8)
MONOCYTES # BLD AUTO: 0.69 K/UL (ref 0–0.85)
MONOCYTES NFR BLD AUTO: 9.6 % (ref 0–13.4)
NEUTROPHILS # BLD AUTO: 4.72 K/UL (ref 1.82–7.42)
NEUTROPHILS NFR BLD: 65.9 % (ref 44–72)
NRBC # BLD AUTO: 0 K/UL
NRBC BLD-RTO: 0 /100 WBC
PLATELET # BLD AUTO: 221 K/UL (ref 164–446)
PMV BLD AUTO: 11 FL (ref 9–12.9)
RBC # BLD AUTO: 5.07 M/UL (ref 4.7–6.1)
TESTOST SERPL-MCNC: 481 NG/DL (ref 175–781)
WBC # BLD AUTO: 7.2 K/UL (ref 4.8–10.8)

## 2020-10-08 PROCEDURE — 84403 ASSAY OF TOTAL TESTOSTERONE: CPT

## 2020-10-08 PROCEDURE — 85025 COMPLETE CBC W/AUTO DIFF WBC: CPT

## 2020-10-08 PROCEDURE — 36415 COLL VENOUS BLD VENIPUNCTURE: CPT

## 2020-10-26 ENCOUNTER — OFFICE VISIT (OUTPATIENT)
Dept: MEDICAL GROUP | Facility: MEDICAL CENTER | Age: 40
End: 2020-10-26
Payer: COMMERCIAL

## 2020-10-26 VITALS
WEIGHT: 270 LBS | HEIGHT: 68 IN | HEART RATE: 80 BPM | OXYGEN SATURATION: 95 % | BODY MASS INDEX: 40.92 KG/M2 | SYSTOLIC BLOOD PRESSURE: 156 MMHG | DIASTOLIC BLOOD PRESSURE: 90 MMHG | TEMPERATURE: 97 F

## 2020-10-26 DIAGNOSIS — K62.5 BRBPR (BRIGHT RED BLOOD PER RECTUM): ICD-10-CM

## 2020-10-26 DIAGNOSIS — E29.1 HYPOGONADISM IN MALE: ICD-10-CM

## 2020-10-26 DIAGNOSIS — I10 ESSENTIAL HYPERTENSION: ICD-10-CM

## 2020-10-26 PROCEDURE — 99214 OFFICE O/P EST MOD 30 MIN: CPT | Performed by: FAMILY MEDICINE

## 2020-10-26 RX ORDER — TESTOSTERONE GEL, 1% 10 MG/G
100 GEL TRANSDERMAL DAILY
Qty: 3 G | Refills: 2 | Status: SHIPPED | OUTPATIENT
Start: 2020-10-26 | End: 2021-01-24

## 2020-10-26 RX ORDER — TESTOSTERONE GEL, 1% 10 MG/G
100 GEL TRANSDERMAL DAILY
Qty: 3 G | Refills: 2 | Status: SHIPPED | OUTPATIENT
Start: 2020-10-26 | End: 2020-10-26 | Stop reason: SDUPTHER

## 2020-10-26 ASSESSMENT — FIBROSIS 4 INDEX: FIB4 SCORE: 0.69

## 2020-10-26 NOTE — ASSESSMENT & PLAN NOTE
Worse after nuvia   Blood on toilet paper x 2 after long ride  No blood since then.   No blood in the stool itself.   Does have pain in the rectum at times.   Denies any family history of colon cancer.

## 2020-10-26 NOTE — PROGRESS NOTES
Subjective:     CC: Diagnoses of Hypogonadism in male, BRBPR (bright red blood per rectum), and Essential hypertension were pertinent to this visit.    HPI: Patient is a 40 y.o. male established patient who presents today to f/u on hypogonadism, elevated BP and concern for blood per rectum.       Hypogonadism in male  Chronic problem. Testerone labs at normal level now.  He is waiting on his refill for his testosterone, was not available at his pharmacy at [facility].    BRBPR (bright red blood per rectum)  New problem.  The patient states that he rode his motorcycle to [place] with his wife, noted some blood on the toilet paper when they got there and once when they got home.  He was riding on his motorcycle for hours for days on end.  No blood since then.   No blood in the stool itself.   Does have pain in the rectum at times.   Denies any family history of colon cancer.   He does not think he has any hemorrhoids.    Essential hypertension  Blood pressure elevated x 2 here in the clinic.  Not checking at home.   Now on testosterone and blood pressure is worse.   Hesitant to start a blood pressure medication.  He is feeling motivated to work on diet and exercise.      History reviewed. No pertinent past medical history.    Social History     Tobacco Use   • Smoking status: Former Smoker     Packs/day: 0.00     Years: 18.00     Pack years: 0.00     Types: Cigars   • Smokeless tobacco: Never Used   • Tobacco comment: 1 cigar a day    Substance Use Topics   • Alcohol use: Yes     Frequency: 2-3 times a week     Comment: Today   • Drug use: Yes     Types: Inhaled, Marijuana     Comment: hasnt smoked in over one year       Current Outpatient Medications Ordered in Epic   Medication Sig Dispense Refill   • testosterone (TESTIM/ANDROGEL) 50 MG/5GM (1%) Gel gel Apply 100 mg to skin as directed every day for 90 days. 3 g 2     No current Epic-ordered facility-administered medications on file.        Allergies:  Patient has  "no known allergies.    Health Maintenance: Completed    ROS:  Pulm: no sob, no cough  CV: no chest pain, no palpitations        Objective:       Exam:  /90 (BP Location: Left arm, Patient Position: Sitting, BP Cuff Size: Adult long)   Pulse 80   Temp 36.1 °C (97 °F) (Temporal)   Ht 1.727 m (5' 8\")   Wt 122.5 kg (270 lb)   SpO2 95%   BMI 41.05 kg/m²  Body mass index is 41.05 kg/m².    General: Normal appearing. No distress.  HEAD: NCAT  EYES: conjunctiva clear, lids without ptosis, pupils equal and reactive to light  EARS: ears normal shape and contour.  MOUTH: normal dentition   Neck:  Normal ROM  Pulmonary: Normal effort. Normal respiratory rate.  Cardiovascular: Well perfused. No LE edema  Neurologic: Grossly normal, no focal deficits  Skin: Warm and dry.  No obvious lesions.  Musculoskeletal: Normal gait and station.   Psych: Normal mood and affect. Alert and oriented x3. Judgment and insight is normal.    Labs: 10/8/20 Results reviewed and discussed with the patient, questions answered.    Assessment & Plan:     40 y.o. male with the following -     1. Hypogonadism in male  Chronic problem.   - testosterone (TESTIM/ANDROGEL) 50 MG/5GM (1%) Gel gel; Apply 100 mg to skin as directed every day for 90 days.  Dispense: 3 g; Refill: 2    2. BRBPR (bright red blood per rectum)  New problem.  Patient has had black-red blood per rectum x2, both after very long motorcycle rides.  Likely has internal hemorrhoid.  Referral placed to gastroenterology for possible intervention.  The patient reports soft stools.  - REFERRAL TO GASTROENTEROLOGY    3. Essential hypertension  New problem.  The patient has an elevated blood pressure x2 here in the clinic.  We discussed diet and exercise at length today, the patient would like to try lifestyle changes prior to starting medication.  He also agrees to get a cuff and bring in a blood pressure log as well as dietary log when he follows up in 1 month.      Return in about 4 " weeks (around 11/23/2020).    Please note that this dictation was created using voice recognition software. I have made every reasonable attempt to correct obvious errors, but I expect that there are errors of grammar and possibly content that I did not discover before finalizing the note.

## 2020-12-11 ENCOUNTER — OFFICE VISIT (OUTPATIENT)
Dept: MEDICAL GROUP | Facility: MEDICAL CENTER | Age: 40
End: 2020-12-11
Payer: COMMERCIAL

## 2020-12-11 VITALS
OXYGEN SATURATION: 94 % | HEART RATE: 74 BPM | TEMPERATURE: 97 F | DIASTOLIC BLOOD PRESSURE: 76 MMHG | HEIGHT: 68 IN | BODY MASS INDEX: 40.09 KG/M2 | WEIGHT: 264.55 LBS | SYSTOLIC BLOOD PRESSURE: 144 MMHG

## 2020-12-11 DIAGNOSIS — M54.50 CHRONIC LOW BACK PAIN, UNSPECIFIED BACK PAIN LATERALITY, UNSPECIFIED WHETHER SCIATICA PRESENT: ICD-10-CM

## 2020-12-11 DIAGNOSIS — G89.29 CHRONIC LOW BACK PAIN, UNSPECIFIED BACK PAIN LATERALITY, UNSPECIFIED WHETHER SCIATICA PRESENT: ICD-10-CM

## 2020-12-11 DIAGNOSIS — K62.5 BRBPR (BRIGHT RED BLOOD PER RECTUM): ICD-10-CM

## 2020-12-11 DIAGNOSIS — E29.1 HYPOGONADISM IN MALE: ICD-10-CM

## 2020-12-11 DIAGNOSIS — I10 ESSENTIAL HYPERTENSION: ICD-10-CM

## 2020-12-11 PROCEDURE — 99214 OFFICE O/P EST MOD 30 MIN: CPT | Performed by: FAMILY MEDICINE

## 2020-12-11 RX ORDER — LISINOPRIL 20 MG/1
20 TABLET ORAL DAILY
Qty: 30 TAB | Refills: 1 | Status: SHIPPED | OUTPATIENT
Start: 2020-12-11 | End: 2022-02-14

## 2020-12-11 ASSESSMENT — FIBROSIS 4 INDEX: FIB4 SCORE: 0.69

## 2020-12-11 NOTE — ASSESSMENT & PLAN NOTE
Chronic problem. BP remaines elvated.   Has not changed lifestyle  Drinking more  Weight doesn but deneis exercise or improved diet.

## 2020-12-11 NOTE — PROGRESS NOTES
Subjective:     CC: Diagnoses of BRBPR (bright red blood per rectum), Hypogonadism in male, Essential hypertension, and Chronic low back pain, unspecified back pain laterality, unspecified whether sciatica present were pertinent to this visit.    HPI: Patient is a 40 y.o. male established patient who presents today to follow-up.      BRBPR (bright red blood per rectum)  Denies any further blood in the stool. Did not schedule an appt with GI yet. Denies any pain.  Had one episode that lasted 2 days after long motorcycle ride.    Hypogonadism in male  Chronic problem. Has not been taking his testosterone, states it is expensive.     Essential hypertension  Chronic problem. BP remaines elvated.   Has not changed lifestyle  Drinking more alcohol  Weight has improved slightly, down 6 pounds although he denies any increase in exercise or improved diet.    History reviewed. No pertinent past medical history.    Social History     Tobacco Use   • Smoking status: Former Smoker     Packs/day: 0.00     Years: 18.00     Pack years: 0.00     Types: Cigars   • Smokeless tobacco: Never Used   • Tobacco comment: 1 cigar a day    Substance Use Topics   • Alcohol use: Yes     Frequency: 2-3 times a week     Comment: Today   • Drug use: Yes     Types: Inhaled, Marijuana     Comment: hasnt smoked in over one year       Current Outpatient Medications Ordered in Epic   Medication Sig Dispense Refill   • lisinopril (PRINIVIL) 20 MG Tab Take 1 Tab by mouth every day. 30 Tab 1   • testosterone (TESTIM/ANDROGEL) 50 MG/5GM (1%) Gel gel Apply 100 mg to skin as directed every day for 90 days. 3 g 2     No current Epic-ordered facility-administered medications on file.        Allergies:  Patient has no known allergies.    Health Maintenance: Completed    ROS:  Pulm: no sob, no cough  CV: no chest pain, no palpitations        Objective:       Exam:  /76 (BP Location: Left arm, Patient Position: Sitting, BP Cuff Size: Adult long)   Pulse 74  "  Temp 36.1 °C (97 °F) (Temporal)   Ht 1.727 m (5' 8\")   Wt 120 kg (264 lb 8.8 oz)   SpO2 94%   BMI 40.22 kg/m²  Body mass index is 40.22 kg/m².    General: Normal appearing. No distress.  HEAD: NCAT  EYES: conjunctiva clear, lids without ptosis, pupils equal and reactive to light  EARS: ears normal shape and contour.  MOUTH: normal dentition   Neck:  Normal ROM  Pulmonary: Normal effort. Normal respiratory rate.  Cardiovascular: Well perfused.   Neurologic: Grossly normal, no focal deficits  Skin: Warm and dry.  No obvious lesions.  Musculoskeletal: Normal gait and station.   Psych: Normal mood and affect. Alert and oriented x3. Judgment and insight is normal.    Labs: 10/8/20 Results reviewed and discussed with the patient, questions answered.    Assessment & Plan:     40 y.o. male with the following -     1. BRBPR (bright red blood per rectum)  Chronic problem, has had no further bleeding per rectum.  I did advise him to schedule appointment GI, he has not done so yet.    2. Hypogonadism in male  Chronic problem, not currently taking his testosterone as he states it is too expensive.    3. Essential hypertension  Chronic problem, uncontrolled.  Patient is open to trying lisinopril, repeat basic metabolic panel ordered for 1 month out, plan to follow-up in 1 month.  - lisinopril (PRINIVIL) 20 MG Tab; Take 1 Tab by mouth every day.  Dispense: 30 Tab; Refill: 1  - Basic Metabolic Panel; Future    4. Chronic low back pain, unspecified back pain laterality, unspecified whether sciatica present  - REFERRAL TO MASSAGE THERAPY      Return in about 4 weeks (around 1/8/2021).    Please note that this dictation was created using voice recognition software. I have made every reasonable attempt to correct obvious errors, but I expect that there are errors of grammar and possibly content that I did not discover before finalizing the note.      "

## 2021-01-05 ENCOUNTER — HOSPITAL ENCOUNTER (OUTPATIENT)
Dept: LAB | Facility: MEDICAL CENTER | Age: 41
End: 2021-01-05
Attending: FAMILY MEDICINE
Payer: COMMERCIAL

## 2021-01-05 ENCOUNTER — OFFICE VISIT (OUTPATIENT)
Dept: MEDICAL GROUP | Facility: MEDICAL CENTER | Age: 41
End: 2021-01-05
Payer: COMMERCIAL

## 2021-01-05 VITALS
TEMPERATURE: 97.4 F | HEIGHT: 68 IN | HEART RATE: 84 BPM | WEIGHT: 258 LBS | BODY MASS INDEX: 39.1 KG/M2 | DIASTOLIC BLOOD PRESSURE: 82 MMHG | OXYGEN SATURATION: 94 % | SYSTOLIC BLOOD PRESSURE: 132 MMHG

## 2021-01-05 DIAGNOSIS — I10 ESSENTIAL HYPERTENSION: ICD-10-CM

## 2021-01-05 DIAGNOSIS — E29.1 HYPOGONADISM IN MALE: ICD-10-CM

## 2021-01-05 DIAGNOSIS — K62.5 BRBPR (BRIGHT RED BLOOD PER RECTUM): ICD-10-CM

## 2021-01-05 LAB
ANION GAP SERPL CALC-SCNC: 9 MMOL/L (ref 7–16)
BUN SERPL-MCNC: 13 MG/DL (ref 8–22)
CALCIUM SERPL-MCNC: 10 MG/DL (ref 8.5–10.5)
CHLORIDE SERPL-SCNC: 104 MMOL/L (ref 96–112)
CO2 SERPL-SCNC: 25 MMOL/L (ref 20–33)
CREAT SERPL-MCNC: 0.86 MG/DL (ref 0.5–1.4)
GLUCOSE SERPL-MCNC: 101 MG/DL (ref 65–99)
POTASSIUM SERPL-SCNC: 4.5 MMOL/L (ref 3.6–5.5)
SODIUM SERPL-SCNC: 138 MMOL/L (ref 135–145)

## 2021-01-05 PROCEDURE — 99213 OFFICE O/P EST LOW 20 MIN: CPT | Performed by: FAMILY MEDICINE

## 2021-01-05 PROCEDURE — 36415 COLL VENOUS BLD VENIPUNCTURE: CPT

## 2021-01-05 PROCEDURE — 80048 BASIC METABOLIC PNL TOTAL CA: CPT

## 2021-01-05 ASSESSMENT — FIBROSIS 4 INDEX: FIB4 SCORE: 0.69

## 2021-01-05 ASSESSMENT — PATIENT HEALTH QUESTIONNAIRE - PHQ9: CLINICAL INTERPRETATION OF PHQ2 SCORE: 0

## 2021-01-05 NOTE — PROGRESS NOTES
"Subjective:     CC: Diagnoses of Hypogonadism in male and BRBPR (bright red blood per rectum) were pertinent to this visit.    HPI: Patient is a 40 y.o. male established patient who presents today to follow-up on hypertension, however, the patient never picked up his lisinopril and has not started the medication yet.  We also do not have any labs to review is to get them done this morning.  However, he does have questions about his testosterone.      Hypogonadism in male  Chronic problem.  Low on labs.  Started on topical testosterone, however, having a hard time remembering putting it on daily.  Would like to switch to injection.     No past medical history on file.    Social History     Tobacco Use   • Smoking status: Former Smoker     Packs/day: 0.00     Years: 18.00     Pack years: 0.00     Types: Cigars   • Smokeless tobacco: Never Used   • Tobacco comment: 1 cigar a day    Substance Use Topics   • Alcohol use: Yes     Frequency: 2-3 times a week     Comment: Today   • Drug use: Yes     Types: Inhaled, Marijuana     Comment: hasnt smoked in over one year       Current Outpatient Medications Ordered in Epic   Medication Sig Dispense Refill   • lisinopril (PRINIVIL) 20 MG Tab Take 1 Tab by mouth every day. 30 Tab 1   • testosterone (TESTIM/ANDROGEL) 50 MG/5GM (1%) Gel gel Apply 100 mg to skin as directed every day for 90 days. 3 g 2     No current Epic-ordered facility-administered medications on file.        Allergies:  Patient has no known allergies.    Health Maintenance: Completed        Objective:       Exam:  /82 (BP Location: Left arm, Patient Position: Sitting, BP Cuff Size: Adult long)   Pulse 84   Temp 36.3 °C (97.4 °F) (Temporal)   Ht 1.727 m (5' 8\")   Wt 117 kg (258 lb)   SpO2 94%   BMI 39.23 kg/m²  Body mass index is 39.23 kg/m².    General: Normal appearing. No distress.  HEAD: NCAT  EYES: conjunctiva clear, lids without ptosis, pupils equal and reactive to light  EARS: ears normal shape " and contour.  MOUTH: normal dentition   Neck:  Normal ROM  Pulmonary: Normal effort. Normal respiratory rate.  Cardiovascular: Well perfused. No LE edema  Neurologic: Grossly normal, no focal deficits  Skin: Warm and dry.  No obvious lesions.  Musculoskeletal: Normal gait and station.   Psych: Normal mood and affect. Alert and oriented x3. Judgment and insight is normal.     Labs: 10/8/2020 results reviewed and discussed with the patient, questions answered.    Assessment & Plan:     40 y.o. male with the following -     1. Hypogonadism in male  Chronic problem, having a hard time remembering putting on the topical testosterone.  Interested in switching to the injection, will send prescription to his pharmacy.    Return in about 4 weeks (around 2/2/2021).    Please note that this dictation was created using voice recognition software. I have made every reasonable attempt to correct obvious errors, but I expect that there are errors of grammar and possibly content that I did not discover before finalizing the note.

## 2021-01-18 ENCOUNTER — TELEPHONE (OUTPATIENT)
Dept: MEDICAL GROUP | Facility: MEDICAL CENTER | Age: 41
End: 2021-01-18

## 2021-01-19 NOTE — TELEPHONE ENCOUNTER
PAR Submitted through covermymeds. Came back as medication is already covered. Called pharmacy and informed them however it was still coming back on their end that it wasn't covered. They stated they will call the insurance to see what is going on.

## 2021-01-19 NOTE — TELEPHONE ENCOUNTER
MEDICATION PRIOR AUTHORIZATION NEEDED:    1. Name of Medication: Depo Testosterone 50 mg/ml    2. Requested By (Name of Pharmacy): [facility]     3. Is insurance on file current? Yes     4. What is the name & phone number of the 3rd party payor? [phone]     Attempted PAR through covermy meds however it would not go through. Tried calling in a prior auth however it was outside of business hours. Will try again later.

## 2021-02-12 ENCOUNTER — OFFICE VISIT (OUTPATIENT)
Dept: MEDICAL GROUP | Facility: MEDICAL CENTER | Age: 41
End: 2021-02-12
Payer: COMMERCIAL

## 2021-02-12 VITALS
TEMPERATURE: 97.8 F | OXYGEN SATURATION: 94 % | HEART RATE: 75 BPM | WEIGHT: 260 LBS | DIASTOLIC BLOOD PRESSURE: 76 MMHG | SYSTOLIC BLOOD PRESSURE: 130 MMHG | HEIGHT: 68 IN | BODY MASS INDEX: 39.4 KG/M2

## 2021-02-12 DIAGNOSIS — E29.1 HYPOGONADISM IN MALE: ICD-10-CM

## 2021-02-12 DIAGNOSIS — I10 ESSENTIAL HYPERTENSION: ICD-10-CM

## 2021-02-12 PROCEDURE — 99213 OFFICE O/P EST LOW 20 MIN: CPT | Performed by: FAMILY MEDICINE

## 2021-02-12 ASSESSMENT — FIBROSIS 4 INDEX: FIB4 SCORE: 0.69

## 2021-02-12 NOTE — ASSESSMENT & PLAN NOTE
Chronic problem.  The patient had a difficult time remembering to use the topical.  He requested injection medication.  Pharmacy did not have Rx so he never picked it up.  Requesting it be sent to a different pharmacy today.

## 2021-02-12 NOTE — PROGRESS NOTES
Subjective:     CC: Diagnoses of Hypogonadism in male and Essential hypertension were pertinent to this visit.    HPI: Patient is a 40 y.o. male established patient who presents today to discuss the following.      Hypogonadism in male  Chronic problem.  The patient had a difficult time remembering to use the topical.  He requested injection medication.  Pharmacy did not have Rx so he never picked it up.  Requesting it be sent to a different pharmacy today.    Essential hypertension  Chronic problem.  Somewhat well-controlled.  Currently on lisinopril 20 mg.  Denies any side effects from the medication.  Continues to try to lose weight.  Drinking less alcohol.      History reviewed. No pertinent past medical history.    Social History     Tobacco Use   • Smoking status: Former Smoker     Packs/day: 0.00     Years: 18.00     Pack years: 0.00     Types: Cigars   • Smokeless tobacco: Never Used   • Tobacco comment: 1 cigar a day    Substance Use Topics   • Alcohol use: Yes     Comment: Today   • Drug use: Yes     Types: Inhaled, Marijuana     Comment: hasnt smoked in over one year       Current Outpatient Medications Ordered in Epic   Medication Sig Dispense Refill   • Testosterone Cypionate 50 MG/ML Solution Inject 50 mg as directed see administration instructions for 30 days. 5 mL 2   • lisinopril (PRINIVIL) 20 MG Tab Take 1 Tab by mouth every day. 30 Tab 1     No current Epic-ordered facility-administered medications on file.       Allergies:  Patient has no known allergies.    Health Maintenance: Completed    ROS:  Gen: no fevers/chill, no changes in weight  Eyes: no changes in vision  ENT: no sore throat, no hearing loss, no bloody nose  Pulm: no sob, no cough  CV: no chest pain, no palpitations  GI: no nausea/vomiting, no diarrhea  : no dysuria  MSk: no myalgias  Skin: no rash  Neuro: no headaches, no numbness/tingling  Heme/Lymph: no easy bruising      Objective:       Exam:  /76 (BP Location: Left arm,  "Patient Position: Sitting, BP Cuff Size: Adult long)   Pulse 75   Temp 36.6 °C (97.8 °F) (Temporal)   Ht 1.727 m (5' 8\")   Wt 118 kg (260 lb)   SpO2 94%   BMI 39.53 kg/m²  Body mass index is 39.53 kg/m².    General: Normal appearing. No distress.  HEAD: NCAT  EYES: conjunctiva clear, lids without ptosis, pupils equal and reactive to light  EARS: ears normal shape and contour.  MOUTH: normal dentition   Neck:  Normal ROM  Pulmonary: Normal effort. Normal respiratory rate.  Cardiovascular: Well perfused. No LE edema  Neurologic: Grossly normal, no focal deficits  Skin: Warm and dry.  No obvious lesions.  Musculoskeletal: Normal gait and station.   Psych: Normal mood and affect. Alert and oriented x3. Judgment and insight is normal.    Labs: 1/5/21 Results reviewed and discussed with the patient, questions answered.    Assessment & Plan:     40 y.o. male with the following -     1. Hypogonadism in male  Chronic problem.  The patient has not started his injection medication yet.  Having issues with his pharmacy, new prescription sent to the [facility].   reviewed.  - Testosterone Cypionate 50 MG/ML Solution; Inject 50 mg as directed see administration instructions for 30 days.  Dispense: 5 mL; Refill: 2    2. Essential hypertension  Chronic problem, somewhat well-controlled.  Will likely come down nicely with diet and exercise, patient is trying to work on weight loss.      Return in about 3 months (around 5/12/2021).    Please note that this dictation was created using voice recognition software. I have made every reasonable attempt to correct obvious errors, but I expect that there are errors of grammar and possibly content that I did not discover before finalizing the note.      "

## 2021-02-12 NOTE — ASSESSMENT & PLAN NOTE
Chronic problem.  Somewhat well-controlled.  Currently on lisinopril 20 mg.  Denies any side effects from the medication.  Continues to try to lose weight.  Drinking less alcohol.

## 2021-07-15 ENCOUNTER — HOSPITAL ENCOUNTER (OUTPATIENT)
Facility: MEDICAL CENTER | Age: 41
End: 2021-07-15
Attending: PHYSICIAN ASSISTANT
Payer: COMMERCIAL

## 2021-07-15 PROCEDURE — 84403 ASSAY OF TOTAL TESTOSTERONE: CPT

## 2021-07-16 LAB — TESTOST SERPL-MCNC: 115 NG/DL (ref 175–781)

## 2021-09-16 ENCOUNTER — HOSPITAL ENCOUNTER (OUTPATIENT)
Dept: LAB | Facility: MEDICAL CENTER | Age: 41
End: 2021-09-16
Attending: PHYSICIAN ASSISTANT
Payer: COMMERCIAL

## 2021-09-16 LAB
BASOPHILS # BLD AUTO: 0.7 % (ref 0–1.8)
BASOPHILS # BLD: 0.05 K/UL (ref 0–0.12)
EOSINOPHIL # BLD AUTO: 0.07 K/UL (ref 0–0.51)
EOSINOPHIL NFR BLD: 1 % (ref 0–6.9)
ERYTHROCYTE [DISTWIDTH] IN BLOOD BY AUTOMATED COUNT: 50.3 FL (ref 35.9–50)
HCT VFR BLD AUTO: 45.6 % (ref 42–52)
HGB BLD-MCNC: 15 G/DL (ref 14–18)
IMM GRANULOCYTES # BLD AUTO: 0.02 K/UL (ref 0–0.11)
IMM GRANULOCYTES NFR BLD AUTO: 0.3 % (ref 0–0.9)
LYMPHOCYTES # BLD AUTO: 2.43 K/UL (ref 1–4.8)
LYMPHOCYTES NFR BLD: 33.6 % (ref 22–41)
MCH RBC QN AUTO: 30.5 PG (ref 27–33)
MCHC RBC AUTO-ENTMCNC: 32.9 G/DL (ref 33.7–35.3)
MCV RBC AUTO: 92.9 FL (ref 81.4–97.8)
MONOCYTES # BLD AUTO: 0.55 K/UL (ref 0–0.85)
MONOCYTES NFR BLD AUTO: 7.6 % (ref 0–13.4)
NEUTROPHILS # BLD AUTO: 4.11 K/UL (ref 1.82–7.42)
NEUTROPHILS NFR BLD: 56.8 % (ref 44–72)
NRBC # BLD AUTO: 0 K/UL
NRBC BLD-RTO: 0 /100 WBC
PLATELET # BLD AUTO: 240 K/UL (ref 164–446)
PMV BLD AUTO: 11.1 FL (ref 9–12.9)
RBC # BLD AUTO: 4.91 M/UL (ref 4.7–6.1)
TESTOST SERPL-MCNC: 191 NG/DL (ref 175–781)
WBC # BLD AUTO: 7.2 K/UL (ref 4.8–10.8)

## 2021-09-16 PROCEDURE — 84153 ASSAY OF PSA TOTAL: CPT

## 2021-09-16 PROCEDURE — 85025 COMPLETE CBC W/AUTO DIFF WBC: CPT

## 2021-09-16 PROCEDURE — 84403 ASSAY OF TOTAL TESTOSTERONE: CPT

## 2021-09-16 PROCEDURE — 36415 COLL VENOUS BLD VENIPUNCTURE: CPT

## 2021-09-17 LAB — PSA SERPL-MCNC: 0.34 NG/ML (ref 0–4)

## 2022-02-14 ENCOUNTER — OFFICE VISIT (OUTPATIENT)
Dept: URGENT CARE | Facility: PHYSICIAN GROUP | Age: 42
End: 2022-02-14
Payer: COMMERCIAL

## 2022-02-14 VITALS
HEART RATE: 85 BPM | SYSTOLIC BLOOD PRESSURE: 140 MMHG | HEIGHT: 68 IN | BODY MASS INDEX: 41.52 KG/M2 | OXYGEN SATURATION: 97 % | RESPIRATION RATE: 16 BRPM | DIASTOLIC BLOOD PRESSURE: 82 MMHG | TEMPERATURE: 98.6 F | WEIGHT: 274 LBS

## 2022-02-14 DIAGNOSIS — J02.9 SORE THROAT: ICD-10-CM

## 2022-02-14 DIAGNOSIS — J34.89 SINUS PRESSURE: ICD-10-CM

## 2022-02-14 DIAGNOSIS — H66.001 ACUTE SUPPURATIVE OTITIS MEDIA OF RIGHT EAR WITHOUT SPONTANEOUS RUPTURE OF TYMPANIC MEMBRANE, RECURRENCE NOT SPECIFIED: ICD-10-CM

## 2022-02-14 PROCEDURE — 99213 OFFICE O/P EST LOW 20 MIN: CPT | Performed by: PHYSICIAN ASSISTANT

## 2022-02-14 RX ORDER — AMOXICILLIN 500 MG/1
500 CAPSULE ORAL 2 TIMES DAILY
Qty: 20 CAPSULE | Refills: 0 | Status: SHIPPED | OUTPATIENT
Start: 2022-02-14 | End: 2022-02-24

## 2022-02-14 RX ORDER — TESTOSTERONE GEL, 1% 10 MG/G
GEL TRANSDERMAL
COMMUNITY
End: 2022-02-14

## 2022-02-14 ASSESSMENT — FIBROSIS 4 INDEX: FIB4 SCORE: 0.65

## 2022-02-14 NOTE — PROGRESS NOTES
Subjective     Bharath Diaz is a 41 y.o. male who presents with Pharyngitis (x 3 days) and Otalgia (x 3 days)    Medications:    • This patient does not have an active medication from one of the medication groupers.    Allergies: Patient has no known allergies.    Problem List: Bharath Diaz does not have any pertinent problems on file.    Surgical History:  Past Surgical History:   Procedure Laterality Date   • EYE LACERATION REPAIR Right 3/14/2018    Procedure: EYE LACERATION REPAIR-EYE LID REPAIR;  Surgeon: Mariann Avalos M.D.;  Location: SURGERY SAME DAY [facility];  Service: Ent       Past Social Hx: Bharath Diaz  reports that he has quit smoking. His smoking use included cigars. He smoked 0.00 packs per day for 18.00 years. He has never used smokeless tobacco. He reports current alcohol use. He reports previous drug use. Drugs: Inhaled and Marijuana.     Past Family Hx:  Bharath Diaz family history includes Diabetes in his maternal grandmother; Heart Disease in his mother; Other (age of onset: 37) in his father; Thyroid in his mother.     Problem list, medications, and allergies reviewed by myself today in Epic.          Patient presents with nasal congestion, sinus pressure, sore throat and ear pain x 3 days.  Pt has been taking some otc ibuprofen for pain with little relief.  No other complaints.       URI   This is a new problem. The current episode started in the past 7 days. The problem has been gradually worsening. There has been no fever. Associated symptoms include congestion, headaches, a plugged ear sensation, sinus pain and a sore throat. Pertinent negatives include no coughing or swollen glands. He has tried NSAIDs and increased fluids for the symptoms. The treatment provided mild relief.       Review of Systems   Constitutional: Negative for chills and fever.   HENT: Positive for congestion, sinus pain and sore throat.    Respiratory: Negative for cough.    Neurological:  "Positive for headaches.   All other systems reviewed and are negative.             Objective     /82   Pulse 85   Temp 37 °C (98.6 °F) (Temporal)   Resp 16   Ht 1.727 m (5' 8\")   Wt 124 kg (274 lb)   SpO2 97%   BMI 41.66 kg/m²      Physical Exam  Vitals and nursing note reviewed.   Constitutional:       General: He is not in acute distress.     Appearance: Normal appearance. He is well-developed and normal weight. He is not ill-appearing or toxic-appearing.   HENT:      Head: Normocephalic and atraumatic.      Right Ear: Tenderness present. No drainage. A middle ear effusion is present. Tympanic membrane is injected, erythematous and retracted.      Left Ear: Tympanic membrane normal.      Nose: Mucosal edema, congestion and rhinorrhea present.      Mouth/Throat:      Lips: Pink.      Mouth: Mucous membranes are moist.      Pharynx: Uvula midline. Posterior oropharyngeal erythema present. No uvula swelling.   Eyes:      Extraocular Movements: Extraocular movements intact.      Conjunctiva/sclera: Conjunctivae normal.      Pupils: Pupils are equal, round, and reactive to light.   Cardiovascular:      Rate and Rhythm: Normal rate and regular rhythm.      Heart sounds: Normal heart sounds.   Pulmonary:      Effort: Pulmonary effort is normal.      Breath sounds: No decreased breath sounds.   Abdominal:      Palpations: Abdomen is soft.   Musculoskeletal:         General: Normal range of motion.      Cervical back: Normal range of motion and neck supple.   Lymphadenopathy:      Cervical: No cervical adenopathy.   Skin:     General: Skin is warm and dry.      Capillary Refill: Capillary refill takes less than 2 seconds.   Neurological:      General: No focal deficit present.      Mental Status: He is alert and oriented to person, place, and time.      Gait: Gait normal.   Psychiatric:         Mood and Affect: Mood normal.         Behavior: Behavior is cooperative.                             Assessment & " Plan              1. Acute suppurative otitis media of right ear without spontaneous rupture of tympanic membrane, recurrence not specified  amoxicillin (AMOXIL) 500 MG Cap   2. Sore throat  amoxicillin (AMOXIL) 500 MG Cap   3. Sinus pressure  amoxicillin (AMOXIL) 500 MG Cap       Patient was evaluated in clinic today while wearing appropriate personal protective equipment.      Pt politely declined covid test.     PT to begin prescription medications today as discussed for AOM of right ear.    PT can begin or continue OTC medications, increase fluids and rest until symptoms improve.     PT should follow up with PCP in 1-2 days for re-evaluation if symptoms have not improved.      Discussed red flags and reasons to return to UC or ED.      Pt and/or family verbalized understanding of diagnosis and follow up instructions and was offered informational handout on diagnosis.  PT discharged.

## 2022-02-14 NOTE — LETTER
February 14, 2022         Patient: Bharath Diaz   YOB: 1980   Date of Visit: 2/14/2022           To Whom it May Concern:    Bharath Diaz was seen in my clinic on 2/14/2022. He may return to work on 02/15/2022.    If you have any questions or concerns, please don't hesitate to call.        Sincerely,           Araceli Hernandez P.A.-C.  Electronically Signed

## 2022-02-22 PROBLEM — Z12.5 ENCOUNTER FOR SCREENING FOR MALIGNANT NEOPLASM OF PROSTATE: Status: ACTIVE | Noted: 2021-07-15

## 2022-02-22 PROBLEM — N52.9 ERECTILE DYSFUNCTION: Status: ACTIVE | Noted: 2021-07-15

## 2022-02-22 RX ORDER — TESTOSTERONE CYPIONATE 200 MG/ML
INJECTION, SOLUTION INTRAMUSCULAR
COMMUNITY
End: 2024-01-22

## 2022-02-22 ASSESSMENT — ENCOUNTER SYMPTOMS
CHILLS: 0
FEVER: 0
SWOLLEN GLANDS: 0
SORE THROAT: 1
SINUS PAIN: 1
COUGH: 0
HEADACHES: 1

## 2022-04-06 ENCOUNTER — HOSPITAL ENCOUNTER (OUTPATIENT)
Dept: LAB | Facility: MEDICAL CENTER | Age: 42
End: 2022-04-06
Attending: INTERNAL MEDICINE
Payer: COMMERCIAL

## 2022-04-06 PROCEDURE — 83013 H PYLORI (C-13) BREATH: CPT

## 2022-04-08 LAB — UREA BREATH TEST QL: NEGATIVE

## 2022-06-24 ENCOUNTER — HOSPITAL ENCOUNTER (OUTPATIENT)
Dept: LAB | Facility: MEDICAL CENTER | Age: 42
End: 2022-06-24
Attending: PHYSICIAN ASSISTANT
Payer: COMMERCIAL

## 2022-06-24 LAB
BASOPHILS # BLD AUTO: 0.6 % (ref 0–1.8)
BASOPHILS # BLD: 0.05 K/UL (ref 0–0.12)
EOSINOPHIL # BLD AUTO: 0.06 K/UL (ref 0–0.51)
EOSINOPHIL NFR BLD: 0.7 % (ref 0–6.9)
ERYTHROCYTE [DISTWIDTH] IN BLOOD BY AUTOMATED COUNT: 47.6 FL (ref 35.9–50)
HCT VFR BLD AUTO: 48.5 % (ref 42–52)
HGB BLD-MCNC: 16.3 G/DL (ref 14–18)
IMM GRANULOCYTES # BLD AUTO: 0.05 K/UL (ref 0–0.11)
IMM GRANULOCYTES NFR BLD AUTO: 0.6 % (ref 0–0.9)
LYMPHOCYTES # BLD AUTO: 2.23 K/UL (ref 1–4.8)
LYMPHOCYTES NFR BLD: 25.2 % (ref 22–41)
MCH RBC QN AUTO: 31 PG (ref 27–33)
MCHC RBC AUTO-ENTMCNC: 33.6 G/DL (ref 33.7–35.3)
MCV RBC AUTO: 92.4 FL (ref 81.4–97.8)
MONOCYTES # BLD AUTO: 0.66 K/UL (ref 0–0.85)
MONOCYTES NFR BLD AUTO: 7.5 % (ref 0–13.4)
NEUTROPHILS # BLD AUTO: 5.79 K/UL (ref 1.82–7.42)
NEUTROPHILS NFR BLD: 65.4 % (ref 44–72)
NRBC # BLD AUTO: 0 K/UL
NRBC BLD-RTO: 0 /100 WBC
PLATELET # BLD AUTO: 257 K/UL (ref 164–446)
PMV BLD AUTO: 10.6 FL (ref 9–12.9)
PSA SERPL-MCNC: 0.33 NG/ML (ref 0–4)
RBC # BLD AUTO: 5.25 M/UL (ref 4.7–6.1)
TESTOST SERPL-MCNC: 607 NG/DL (ref 175–781)
WBC # BLD AUTO: 8.8 K/UL (ref 4.8–10.8)

## 2022-06-24 PROCEDURE — 84153 ASSAY OF PSA TOTAL: CPT

## 2022-06-24 PROCEDURE — 85025 COMPLETE CBC W/AUTO DIFF WBC: CPT

## 2022-06-24 PROCEDURE — 36415 COLL VENOUS BLD VENIPUNCTURE: CPT

## 2022-06-24 PROCEDURE — 84403 ASSAY OF TOTAL TESTOSTERONE: CPT

## 2022-11-23 ENCOUNTER — HOSPITAL ENCOUNTER (OUTPATIENT)
Dept: LAB | Facility: MEDICAL CENTER | Age: 42
End: 2022-11-23
Attending: PHYSICIAN ASSISTANT
Payer: COMMERCIAL

## 2022-11-23 LAB
ALBUMIN SERPL BCP-MCNC: 4.3 G/DL (ref 3.2–4.9)
ALBUMIN/GLOB SERPL: 1.4 G/DL
ALP SERPL-CCNC: 42 U/L (ref 30–99)
ALT SERPL-CCNC: 31 U/L (ref 2–50)
ANION GAP SERPL CALC-SCNC: 13 MMOL/L (ref 7–16)
AST SERPL-CCNC: 25 U/L (ref 12–45)
BASOPHILS # BLD AUTO: 0.8 % (ref 0–1.8)
BASOPHILS # BLD: 0.09 K/UL (ref 0–0.12)
BILIRUB SERPL-MCNC: 0.3 MG/DL (ref 0.1–1.5)
BUN SERPL-MCNC: 9 MG/DL (ref 8–22)
CALCIUM SERPL-MCNC: 9.4 MG/DL (ref 8.5–10.5)
CHLORIDE SERPL-SCNC: 104 MMOL/L (ref 96–112)
CO2 SERPL-SCNC: 23 MMOL/L (ref 20–33)
CREAT SERPL-MCNC: 1.02 MG/DL (ref 0.5–1.4)
EOSINOPHIL # BLD AUTO: 0.1 K/UL (ref 0–0.51)
EOSINOPHIL NFR BLD: 0.9 % (ref 0–6.9)
ERYTHROCYTE [DISTWIDTH] IN BLOOD BY AUTOMATED COUNT: 50.4 FL (ref 35.9–50)
GFR SERPLBLD CREATININE-BSD FMLA CKD-EPI: 93 ML/MIN/1.73 M 2
GLOBULIN SER CALC-MCNC: 3.1 G/DL (ref 1.9–3.5)
GLUCOSE SERPL-MCNC: 101 MG/DL (ref 65–99)
HCT VFR BLD AUTO: 51.2 % (ref 42–52)
HGB BLD-MCNC: 17 G/DL (ref 14–18)
IMM GRANULOCYTES # BLD AUTO: 0.03 K/UL (ref 0–0.11)
IMM GRANULOCYTES NFR BLD AUTO: 0.3 % (ref 0–0.9)
LYMPHOCYTES # BLD AUTO: 3.5 K/UL (ref 1–4.8)
LYMPHOCYTES NFR BLD: 32.8 % (ref 22–41)
MCH RBC QN AUTO: 29.9 PG (ref 27–33)
MCHC RBC AUTO-ENTMCNC: 33.2 G/DL (ref 33.7–35.3)
MCV RBC AUTO: 90 FL (ref 81.4–97.8)
MONOCYTES # BLD AUTO: 0.98 K/UL (ref 0–0.85)
MONOCYTES NFR BLD AUTO: 9.2 % (ref 0–13.4)
NEUTROPHILS # BLD AUTO: 5.98 K/UL (ref 1.82–7.42)
NEUTROPHILS NFR BLD: 56 % (ref 44–72)
NRBC # BLD AUTO: 0 K/UL
NRBC BLD-RTO: 0 /100 WBC
PLATELET # BLD AUTO: 268 K/UL (ref 164–446)
PMV BLD AUTO: 11.1 FL (ref 9–12.9)
POTASSIUM SERPL-SCNC: 4.3 MMOL/L (ref 3.6–5.5)
PROT SERPL-MCNC: 7.4 G/DL (ref 6–8.2)
RBC # BLD AUTO: 5.69 M/UL (ref 4.7–6.1)
SODIUM SERPL-SCNC: 140 MMOL/L (ref 135–145)
TESTOST SERPL-MCNC: 1112 NG/DL (ref 175–781)
WBC # BLD AUTO: 10.7 K/UL (ref 4.8–10.8)

## 2022-11-23 PROCEDURE — 84403 ASSAY OF TOTAL TESTOSTERONE: CPT

## 2022-11-23 PROCEDURE — 36415 COLL VENOUS BLD VENIPUNCTURE: CPT

## 2022-11-23 PROCEDURE — 80053 COMPREHEN METABOLIC PANEL: CPT

## 2022-11-23 PROCEDURE — 85025 COMPLETE CBC W/AUTO DIFF WBC: CPT

## 2023-02-06 NOTE — PROGRESS NOTES
Bed: 64  Expected date:   Expected time:   Means of arrival:   Comments:  HOLD   Labs drawn and sent to lab

## 2023-05-16 ENCOUNTER — ANESTHESIA EVENT (OUTPATIENT)
Dept: SURGERY | Facility: MEDICAL CENTER | Age: 43
DRG: 957 | End: 2023-05-16
Payer: COMMERCIAL

## 2023-05-16 ENCOUNTER — ANESTHESIA (OUTPATIENT)
Dept: SURGERY | Facility: MEDICAL CENTER | Age: 43
DRG: 957 | End: 2023-05-16
Payer: COMMERCIAL

## 2023-05-16 ENCOUNTER — APPOINTMENT (OUTPATIENT)
Dept: RADIOLOGY | Facility: MEDICAL CENTER | Age: 43
DRG: 957 | End: 2023-05-16
Attending: EMERGENCY MEDICINE
Payer: COMMERCIAL

## 2023-05-16 ENCOUNTER — APPOINTMENT (OUTPATIENT)
Dept: RADIOLOGY | Facility: MEDICAL CENTER | Age: 43
DRG: 957 | End: 2023-05-16
Attending: NEUROLOGICAL SURGERY
Payer: COMMERCIAL

## 2023-05-16 ENCOUNTER — APPOINTMENT (OUTPATIENT)
Dept: RADIOLOGY | Facility: MEDICAL CENTER | Age: 43
DRG: 957 | End: 2023-05-16
Attending: SURGERY
Payer: COMMERCIAL

## 2023-05-16 ENCOUNTER — APPOINTMENT (OUTPATIENT)
Dept: RADIOLOGY | Facility: MEDICAL CENTER | Age: 43
DRG: 957 | End: 2023-05-16
Payer: COMMERCIAL

## 2023-05-16 ENCOUNTER — HOSPITAL ENCOUNTER (INPATIENT)
Facility: MEDICAL CENTER | Age: 43
LOS: 7 days | DRG: 957 | End: 2023-05-23
Attending: EMERGENCY MEDICINE | Admitting: SURGERY
Payer: COMMERCIAL

## 2023-05-16 DIAGNOSIS — S22.089A CLOSED FRACTURE OF TWELFTH THORACIC VERTEBRA, UNSPECIFIED FRACTURE MORPHOLOGY, INITIAL ENCOUNTER (HCC): ICD-10-CM

## 2023-05-16 DIAGNOSIS — S22.41XA CLOSED FRACTURE OF MULTIPLE RIBS OF RIGHT SIDE, INITIAL ENCOUNTER: ICD-10-CM

## 2023-05-16 DIAGNOSIS — S24.103A SPINAL CORD INJURY AT T7-T12 LEVEL (HCC): ICD-10-CM

## 2023-05-16 DIAGNOSIS — S27.322A CONTUSION OF BOTH LUNGS, INITIAL ENCOUNTER: ICD-10-CM

## 2023-05-16 PROBLEM — F10.929 ACUTE ALCOHOL INTOXICATION (HCC): Status: ACTIVE | Noted: 2023-05-16

## 2023-05-16 PROBLEM — J95.821 ACUTE RESPIRATORY FAILURE FOLLOWING TRAUMA AND SURGERY (HCC): Status: ACTIVE | Noted: 2023-05-16

## 2023-05-16 PROBLEM — Z53.09 CONTRAINDICATION TO ANTICOAGULATION THERAPY: Status: ACTIVE | Noted: 2023-05-16

## 2023-05-16 PROBLEM — T14.90XA TRAUMA: Status: ACTIVE | Noted: 2023-05-16

## 2023-05-16 LAB
ABO + RH BLD: NORMAL
ABO GROUP BLD: NORMAL
ALBUMIN SERPL BCP-MCNC: 4.1 G/DL (ref 3.2–4.9)
ALBUMIN/GLOB SERPL: 1.4 G/DL
ALP SERPL-CCNC: 43 U/L (ref 30–99)
ALT SERPL-CCNC: 65 U/L (ref 2–50)
ANION GAP SERPL CALC-SCNC: 17 MMOL/L (ref 7–16)
APTT PPP: 23.1 SEC (ref 24.7–36)
AST SERPL-CCNC: 76 U/L (ref 12–45)
BILIRUB SERPL-MCNC: 0.5 MG/DL (ref 0.1–1.5)
BLD GP AB SCN SERPL QL: NORMAL
BUN SERPL-MCNC: 8 MG/DL (ref 8–22)
CALCIUM ALBUM COR SERPL-MCNC: 7.9 MG/DL (ref 8.5–10.5)
CALCIUM SERPL-MCNC: 8 MG/DL (ref 8.5–10.5)
CFT BLD TEG: 4.2 MIN (ref 4.6–9.1)
CFT P HPASE BLD TEG: 3.2 MIN (ref 4.3–8.3)
CHLORIDE SERPL-SCNC: 109 MMOL/L (ref 96–112)
CLOT ANGLE BLD TEG: 70.9 DEGREES (ref 63–78)
CLOT LYSIS 30M P MA LENFR BLD TEG: 0.2 % (ref 0–2.6)
CO2 SERPL-SCNC: 17 MMOL/L (ref 20–33)
CREAT SERPL-MCNC: 1.04 MG/DL (ref 0.5–1.4)
CT.EXTRINSIC BLD ROTEM: 1.6 MIN (ref 0.8–2.1)
ERYTHROCYTE [DISTWIDTH] IN BLOOD BY AUTOMATED COUNT: 51.1 FL (ref 35.9–50)
ETHANOL BLD-MCNC: 177 MG/DL
GFR SERPLBLD CREATININE-BSD FMLA CKD-EPI: 91 ML/MIN/1.73 M 2
GLOBULIN SER CALC-MCNC: 2.9 G/DL (ref 1.9–3.5)
GLUCOSE SERPL-MCNC: 182 MG/DL (ref 65–99)
HCT VFR BLD AUTO: 43.3 % (ref 42–52)
HGB BLD-MCNC: 14.2 G/DL (ref 14–18)
INR PPP: 1.04 (ref 0.87–1.13)
MCF BLD TEG: 56.5 MM (ref 52–69)
MCF.PLATELET INHIB BLD ROTEM: 16.7 MM (ref 15–32)
MCH RBC QN AUTO: 31.3 PG (ref 27–33)
MCHC RBC AUTO-ENTMCNC: 32.8 G/DL (ref 33.7–35.3)
MCV RBC AUTO: 95.4 FL (ref 81.4–97.8)
PA AA BLD-ACNC: 3.1 % (ref 0–11)
PA ADP BLD-ACNC: 66.5 % (ref 0–17)
PLATELET # BLD AUTO: 187 K/UL (ref 164–446)
PMV BLD AUTO: 10.3 FL (ref 9–12.9)
POTASSIUM SERPL-SCNC: 3.9 MMOL/L (ref 3.6–5.5)
PROT SERPL-MCNC: 7 G/DL (ref 6–8.2)
PROTHROMBIN TIME: 13.5 SEC (ref 12–14.6)
RBC # BLD AUTO: 4.54 M/UL (ref 4.7–6.1)
RH BLD: NORMAL
SODIUM SERPL-SCNC: 143 MMOL/L (ref 135–145)
TEG ALGORITHM TGALG: ABNORMAL
WBC # BLD AUTO: 13.9 K/UL (ref 4.8–10.8)

## 2023-05-16 PROCEDURE — C1751 CATH, INF, PER/CENT/MIDLINE: HCPCS

## 2023-05-16 PROCEDURE — 70450 CT HEAD/BRAIN W/O DYE: CPT

## 2023-05-16 PROCEDURE — 82077 ASSAY SPEC XCP UR&BREATH IA: CPT

## 2023-05-16 PROCEDURE — 0JC70ZZ EXTIRPATION OF MATTER FROM BACK SUBCUTANEOUS TISSUE AND FASCIA, OPEN APPROACH: ICD-10-PCS | Performed by: NEUROLOGICAL SURGERY

## 2023-05-16 PROCEDURE — 0PH404Z INSERTION OF INTERNAL FIXATION DEVICE INTO THORACIC VERTEBRA, OPEN APPROACH: ICD-10-PCS | Performed by: NEUROLOGICAL SURGERY

## 2023-05-16 PROCEDURE — 0RG60K1 FUSION OF THORACIC VERTEBRAL JOINT WITH NONAUTOLOGOUS TISSUE SUBSTITUTE, POSTERIOR APPROACH, POSTERIOR COLUMN, OPEN APPROACH: ICD-10-PCS | Performed by: NEUROLOGICAL SURGERY

## 2023-05-16 PROCEDURE — 72148 MRI LUMBAR SPINE W/O DYE: CPT

## 2023-05-16 PROCEDURE — 02HV33Z INSERTION OF INFUSION DEVICE INTO SUPERIOR VENA CAVA, PERCUTANEOUS APPROACH: ICD-10-PCS | Performed by: SURGERY

## 2023-05-16 PROCEDURE — 85610 PROTHROMBIN TIME: CPT

## 2023-05-16 PROCEDURE — 85730 THROMBOPLASTIN TIME PARTIAL: CPT

## 2023-05-16 PROCEDURE — 86850 RBC ANTIBODY SCREEN: CPT

## 2023-05-16 PROCEDURE — 71260 CT THORAX DX C+: CPT

## 2023-05-16 PROCEDURE — 72131 CT LUMBAR SPINE W/O DYE: CPT

## 2023-05-16 PROCEDURE — 85384 FIBRINOGEN ACTIVITY: CPT

## 2023-05-16 PROCEDURE — 86900 BLOOD TYPING SEROLOGIC ABO: CPT

## 2023-05-16 PROCEDURE — 31500 INSERT EMERGENCY AIRWAY: CPT | Performed by: SURGERY

## 2023-05-16 PROCEDURE — 71045 X-RAY EXAM CHEST 1 VIEW: CPT

## 2023-05-16 PROCEDURE — 99291 CRITICAL CARE FIRST HOUR: CPT

## 2023-05-16 PROCEDURE — 85576 BLOOD PLATELET AGGREGATION: CPT

## 2023-05-16 PROCEDURE — 72141 MRI NECK SPINE W/O DYE: CPT

## 2023-05-16 PROCEDURE — 36415 COLL VENOUS BLD VENIPUNCTURE: CPT

## 2023-05-16 PROCEDURE — 51798 US URINE CAPACITY MEASURE: CPT

## 2023-05-16 PROCEDURE — 72128 CT CHEST SPINE W/O DYE: CPT

## 2023-05-16 PROCEDURE — 31500 INSERT EMERGENCY AIRWAY: CPT

## 2023-05-16 PROCEDURE — 700111 HCHG RX REV CODE 636 W/ 250 OVERRIDE (IP): Performed by: SURGERY

## 2023-05-16 PROCEDURE — 99291 CRITICAL CARE FIRST HOUR: CPT | Mod: 25 | Performed by: SURGERY

## 2023-05-16 PROCEDURE — 96376 TX/PRO/DX INJ SAME DRUG ADON: CPT

## 2023-05-16 PROCEDURE — 36556 INSERT NON-TUNNEL CV CATH: CPT

## 2023-05-16 PROCEDURE — 700117 HCHG RX CONTRAST REV CODE 255: Performed by: EMERGENCY MEDICINE

## 2023-05-16 PROCEDURE — 36620 INSERTION CATHETER ARTERY: CPT

## 2023-05-16 PROCEDURE — 85347 COAGULATION TIME ACTIVATED: CPT

## 2023-05-16 PROCEDURE — 36556 INSERT NON-TUNNEL CV CATH: CPT | Performed by: SURGERY

## 2023-05-16 PROCEDURE — G0390 TRAUMA RESPONS W/HOSP CRITI: HCPCS

## 2023-05-16 PROCEDURE — 85027 COMPLETE CBC AUTOMATED: CPT

## 2023-05-16 PROCEDURE — 72146 MRI CHEST SPINE W/O DYE: CPT

## 2023-05-16 PROCEDURE — 80053 COMPREHEN METABOLIC PANEL: CPT

## 2023-05-16 PROCEDURE — 0BH17EZ INSERTION OF ENDOTRACHEAL AIRWAY INTO TRACHEA, VIA NATURAL OR ARTIFICIAL OPENING: ICD-10-PCS | Performed by: SURGERY

## 2023-05-16 PROCEDURE — 5A1945Z RESPIRATORY VENTILATION, 24-96 CONSECUTIVE HOURS: ICD-10-PCS | Performed by: SURGERY

## 2023-05-16 PROCEDURE — 72125 CT NECK SPINE W/O DYE: CPT

## 2023-05-16 PROCEDURE — 700111 HCHG RX REV CODE 636 W/ 250 OVERRIDE (IP): Performed by: EMERGENCY MEDICINE

## 2023-05-16 PROCEDURE — 0RGA0K1 FUSION OF THORACOLUMBAR VERTEBRAL JOINT WITH NONAUTOLOGOUS TISSUE SUBSTITUTE, POSTERIOR APPROACH, POSTERIOR COLUMN, OPEN APPROACH: ICD-10-PCS | Performed by: NEUROLOGICAL SURGERY

## 2023-05-16 PROCEDURE — 01N80ZZ RELEASE THORACIC NERVE, OPEN APPROACH: ICD-10-PCS | Performed by: NEUROLOGICAL SURGERY

## 2023-05-16 PROCEDURE — 94002 VENT MGMT INPAT INIT DAY: CPT

## 2023-05-16 PROCEDURE — 96374 THER/PROPH/DIAG INJ IV PUSH: CPT

## 2023-05-16 PROCEDURE — 770022 HCHG ROOM/CARE - ICU (200)

## 2023-05-16 PROCEDURE — 86901 BLOOD TYPING SEROLOGIC RH(D): CPT

## 2023-05-16 PROCEDURE — 99153 MOD SED SAME PHYS/QHP EA: CPT

## 2023-05-16 PROCEDURE — 99152 MOD SED SAME PHYS/QHP 5/>YRS: CPT

## 2023-05-16 PROCEDURE — 700101 HCHG RX REV CODE 250: Performed by: SURGERY

## 2023-05-16 DEVICE — ORTHOBLEND 10CC: Type: IMPLANTABLE DEVICE | Site: BACK | Status: FUNCTIONAL

## 2023-05-16 DEVICE — IMPLANTABLE DEVICE: Type: IMPLANTABLE DEVICE | Site: BACK | Status: FUNCTIONAL

## 2023-05-16 DEVICE — SCREW MAS  SOLERA 7.5 X 45MM (1TCX8+3TCX8=32): Type: IMPLANTABLE DEVICE | Site: BACK | Status: FUNCTIONAL

## 2023-05-16 DEVICE — SCREW MAS  SOLERA 6.5 X 50MM (1TCX16+3TCX8=40): Type: IMPLANTABLE DEVICE | Site: BACK | Status: FUNCTIONAL

## 2023-05-16 DEVICE — SCREW SOLERA SET SCREW (1TCX40+3TCX21+2TCX10=123): Type: IMPLANTABLE DEVICE | Site: BACK | Status: FUNCTIONAL

## 2023-05-16 RX ORDER — POLYETHYLENE GLYCOL 3350 17 G/17G
1 POWDER, FOR SOLUTION ORAL 2 TIMES DAILY
Status: DISCONTINUED | OUTPATIENT
Start: 2023-05-17 | End: 2023-05-21

## 2023-05-16 RX ORDER — ACETAMINOPHEN 500 MG
1000 TABLET ORAL EVERY 6 HOURS
Status: DISCONTINUED | OUTPATIENT
Start: 2023-05-17 | End: 2023-05-21

## 2023-05-16 RX ORDER — LORAZEPAM 2 MG/ML
2 INJECTION INTRAMUSCULAR
Status: ACTIVE | OUTPATIENT
Start: 2023-05-16 | End: 2023-05-17

## 2023-05-16 RX ORDER — ONDANSETRON 2 MG/ML
4 INJECTION INTRAMUSCULAR; INTRAVENOUS EVERY 4 HOURS PRN
Status: DISCONTINUED | OUTPATIENT
Start: 2023-05-16 | End: 2023-05-23

## 2023-05-16 RX ORDER — ONDANSETRON 4 MG/1
4 TABLET, ORALLY DISINTEGRATING ORAL EVERY 4 HOURS PRN
Status: DISCONTINUED | OUTPATIENT
Start: 2023-05-16 | End: 2023-05-21

## 2023-05-16 RX ORDER — FAMOTIDINE 20 MG/1
20 TABLET, FILM COATED ORAL 2 TIMES DAILY
Status: DISCONTINUED | OUTPATIENT
Start: 2023-05-16 | End: 2023-05-19

## 2023-05-16 RX ORDER — SUCCINYLCHOLINE CHLORIDE 20 MG/ML
100 INJECTION INTRAMUSCULAR; INTRAVENOUS ONCE
Status: ACTIVE | OUTPATIENT
Start: 2023-05-16 | End: 2023-05-17

## 2023-05-16 RX ORDER — LABETALOL HYDROCHLORIDE 5 MG/ML
10 INJECTION, SOLUTION INTRAVENOUS EVERY 4 HOURS PRN
Status: DISCONTINUED | OUTPATIENT
Start: 2023-05-16 | End: 2023-05-19

## 2023-05-16 RX ORDER — PROPOFOL 10 MG/ML
80 INJECTION, EMULSION INTRAVENOUS ONCE
Status: COMPLETED | OUTPATIENT
Start: 2023-05-16 | End: 2023-05-16

## 2023-05-16 RX ORDER — AMOXICILLIN 250 MG
1 CAPSULE ORAL
Status: DISCONTINUED | OUTPATIENT
Start: 2023-05-16 | End: 2023-05-21

## 2023-05-16 RX ORDER — AMOXICILLIN 250 MG
1 CAPSULE ORAL NIGHTLY
Status: DISCONTINUED | OUTPATIENT
Start: 2023-05-17 | End: 2023-05-21

## 2023-05-16 RX ORDER — ROCURONIUM BROMIDE 10 MG/ML
50 INJECTION, SOLUTION INTRAVENOUS ONCE
Status: COMPLETED | OUTPATIENT
Start: 2023-05-16 | End: 2023-05-16

## 2023-05-16 RX ORDER — AMOXICILLIN 250 MG
1 CAPSULE ORAL
Status: DISCONTINUED | OUTPATIENT
Start: 2023-05-16 | End: 2023-05-16

## 2023-05-16 RX ORDER — POLYETHYLENE GLYCOL 3350 17 G/17G
1 POWDER, FOR SOLUTION ORAL 2 TIMES DAILY
Status: DISCONTINUED | OUTPATIENT
Start: 2023-05-17 | End: 2023-05-16

## 2023-05-16 RX ORDER — ACETAMINOPHEN 500 MG
1000 TABLET ORAL EVERY 6 HOURS PRN
Status: DISCONTINUED | OUTPATIENT
Start: 2023-05-22 | End: 2023-05-21

## 2023-05-16 RX ORDER — MORPHINE SULFATE 4 MG/ML
4 INJECTION INTRAVENOUS ONCE
Status: COMPLETED | OUTPATIENT
Start: 2023-05-16 | End: 2023-05-16

## 2023-05-16 RX ORDER — SODIUM CHLORIDE, SODIUM LACTATE, POTASSIUM CHLORIDE, CALCIUM CHLORIDE 600; 310; 30; 20 MG/100ML; MG/100ML; MG/100ML; MG/100ML
INJECTION, SOLUTION INTRAVENOUS CONTINUOUS
Status: DISCONTINUED | OUTPATIENT
Start: 2023-05-16 | End: 2023-05-19

## 2023-05-16 RX ORDER — BISACODYL 10 MG
10 SUPPOSITORY, RECTAL RECTAL
Status: DISCONTINUED | OUTPATIENT
Start: 2023-05-16 | End: 2023-05-23 | Stop reason: HOSPADM

## 2023-05-16 RX ORDER — DOCUSATE SODIUM 100 MG/1
100 CAPSULE, LIQUID FILLED ORAL 2 TIMES DAILY
Status: DISCONTINUED | OUTPATIENT
Start: 2023-05-17 | End: 2023-05-16

## 2023-05-16 RX ORDER — NOREPINEPHRINE BITARTRATE 0.03 MG/ML
0-1 INJECTION, SOLUTION INTRAVENOUS CONTINUOUS
Status: DISCONTINUED | OUTPATIENT
Start: 2023-05-16 | End: 2023-05-19

## 2023-05-16 RX ORDER — DOCUSATE SODIUM 50 MG/5ML
100 LIQUID ORAL 2 TIMES DAILY
Status: DISCONTINUED | OUTPATIENT
Start: 2023-05-17 | End: 2023-05-21

## 2023-05-16 RX ORDER — ROCURONIUM BROMIDE 10 MG/ML
100 INJECTION, SOLUTION INTRAVENOUS ONCE
Status: DISCONTINUED | OUTPATIENT
Start: 2023-05-16 | End: 2023-05-16

## 2023-05-16 RX ORDER — HYDROMORPHONE HYDROCHLORIDE 1 MG/ML
0.5 INJECTION, SOLUTION INTRAMUSCULAR; INTRAVENOUS; SUBCUTANEOUS
Status: DISCONTINUED | OUTPATIENT
Start: 2023-05-16 | End: 2023-05-17

## 2023-05-16 RX ORDER — ONDANSETRON 4 MG/1
4 TABLET, ORALLY DISINTEGRATING ORAL EVERY 4 HOURS PRN
Status: DISCONTINUED | OUTPATIENT
Start: 2023-05-16 | End: 2023-05-16

## 2023-05-16 RX ORDER — PROPOFOL 10 MG/ML
200 INJECTION, EMULSION INTRAVENOUS ONCE
Status: DISCONTINUED | OUTPATIENT
Start: 2023-05-16 | End: 2023-05-16

## 2023-05-16 RX ORDER — OXYCODONE HYDROCHLORIDE 5 MG/1
5 TABLET ORAL
Status: DISCONTINUED | OUTPATIENT
Start: 2023-05-16 | End: 2023-05-17

## 2023-05-16 RX ORDER — AMOXICILLIN 250 MG
1 CAPSULE ORAL NIGHTLY
Status: DISCONTINUED | OUTPATIENT
Start: 2023-05-17 | End: 2023-05-16

## 2023-05-16 RX ORDER — OXYCODONE HYDROCHLORIDE 10 MG/1
10 TABLET ORAL
Status: DISCONTINUED | OUTPATIENT
Start: 2023-05-16 | End: 2023-05-17

## 2023-05-16 RX ORDER — LIDOCAINE 50 MG/G
1 PATCH TOPICAL EVERY 24 HOURS
Status: DISCONTINUED | OUTPATIENT
Start: 2023-05-17 | End: 2023-05-23 | Stop reason: HOSPADM

## 2023-05-16 RX ORDER — SUCCINYLCHOLINE CHLORIDE 20 MG/ML
100 INJECTION INTRAMUSCULAR; INTRAVENOUS ONCE
Status: COMPLETED | OUTPATIENT
Start: 2023-05-16 | End: 2023-05-16

## 2023-05-16 RX ORDER — ENEMA 19; 7 G/133ML; G/133ML
1 ENEMA RECTAL
Status: DISCONTINUED | OUTPATIENT
Start: 2023-05-16 | End: 2023-05-23 | Stop reason: HOSPADM

## 2023-05-16 RX ADMIN — HYDROMORPHONE HYDROCHLORIDE 1 MG: 1 INJECTION, SOLUTION INTRAMUSCULAR; INTRAVENOUS; SUBCUTANEOUS at 21:49

## 2023-05-16 RX ADMIN — SUCCINYLCHOLINE CHLORIDE 100 MG: 20 INJECTION, SOLUTION INTRAMUSCULAR; INTRAVENOUS at 21:33

## 2023-05-16 RX ADMIN — HYDROMORPHONE HYDROCHLORIDE 0.5 MG: 1 INJECTION, SOLUTION INTRAMUSCULAR; INTRAVENOUS; SUBCUTANEOUS at 23:10

## 2023-05-16 RX ADMIN — IOHEXOL 100 ML: 350 INJECTION, SOLUTION INTRAVENOUS at 19:05

## 2023-05-16 RX ADMIN — ROCURONIUM BROMIDE 50 MG: 10 INJECTION, SOLUTION INTRAVENOUS at 22:01

## 2023-05-16 RX ADMIN — FENTANYL CITRATE 100 MCG: 50 INJECTION, SOLUTION INTRAMUSCULAR; INTRAVENOUS at 18:42

## 2023-05-16 RX ADMIN — FAMOTIDINE 20 MG: 10 INJECTION INTRAVENOUS at 22:39

## 2023-05-16 RX ADMIN — PROPOFOL 5 MCG/KG/MIN: 10 INJECTION, EMULSION INTRAVENOUS at 22:07

## 2023-05-16 RX ADMIN — PROPOFOL 80 MG: 10 INJECTION, EMULSION INTRAVENOUS at 21:32

## 2023-05-16 RX ADMIN — ROCURONIUM BROMIDE 50 MG: 50 INJECTION, SOLUTION INTRAVENOUS at 21:36

## 2023-05-16 RX ADMIN — MORPHINE SULFATE 4 MG: 4 INJECTION INTRAVENOUS at 20:32

## 2023-05-17 ENCOUNTER — APPOINTMENT (OUTPATIENT)
Dept: RADIOLOGY | Facility: MEDICAL CENTER | Age: 43
DRG: 957 | End: 2023-05-17
Payer: COMMERCIAL

## 2023-05-17 ENCOUNTER — APPOINTMENT (OUTPATIENT)
Dept: RADIOLOGY | Facility: MEDICAL CENTER | Age: 43
DRG: 957 | End: 2023-05-17
Attending: SURGERY
Payer: COMMERCIAL

## 2023-05-17 PROBLEM — R93.7 ABNORMAL CT SCAN, CERVICAL SPINE: Status: ACTIVE | Noted: 2023-05-17

## 2023-05-17 PROBLEM — Z75.8 DISCHARGE PLANNING ISSUES: Status: ACTIVE | Noted: 2023-05-17

## 2023-05-17 PROBLEM — Z02.9 DISCHARGE PLANNING ISSUES: Status: ACTIVE | Noted: 2023-05-17

## 2023-05-17 PROBLEM — S22.008A CLOSED FRACTURE OF SPINOUS PROCESS OF THORACIC VERTEBRA (HCC): Status: ACTIVE | Noted: 2023-05-17

## 2023-05-17 PROBLEM — S22.41XA CLOSED FRACTURE OF THREE RIBS OF RIGHT SIDE: Status: ACTIVE | Noted: 2023-05-17

## 2023-05-17 PROBLEM — E83.51 HYPOCALCEMIA: Status: ACTIVE | Noted: 2023-05-17

## 2023-05-17 PROBLEM — S24.103A SPINAL CORD INJURY AT T7-T12 LEVEL (HCC): Status: ACTIVE | Noted: 2023-05-16

## 2023-05-17 PROBLEM — S32.009A LUMBAR TRANSVERSE PROCESS FRACTURE, CLOSED, INITIAL ENCOUNTER (HCC): Status: ACTIVE | Noted: 2023-05-17

## 2023-05-17 LAB
ALBUMIN SERPL BCP-MCNC: 3.5 G/DL (ref 3.2–4.9)
ALBUMIN/GLOB SERPL: 1.3 G/DL
ALP SERPL-CCNC: 38 U/L (ref 30–99)
ALT SERPL-CCNC: 52 U/L (ref 2–50)
ANION GAP SERPL CALC-SCNC: 16 MMOL/L (ref 7–16)
ARTERIAL PATENCY WRIST A: ABNORMAL
AST SERPL-CCNC: 60 U/L (ref 12–45)
BASE EXCESS BLDA CALC-SCNC: -3 MMOL/L (ref -4–3)
BASOPHILS # BLD AUTO: 0.3 % (ref 0–1.8)
BASOPHILS # BLD: 0.05 K/UL (ref 0–0.12)
BILIRUB SERPL-MCNC: 0.6 MG/DL (ref 0.1–1.5)
BODY TEMPERATURE: ABNORMAL DEGREES
BREATHS SETTING VENT: 20
BUN SERPL-MCNC: 8 MG/DL (ref 8–22)
CALCIUM ALBUM COR SERPL-MCNC: 7.4 MG/DL (ref 8.5–10.5)
CALCIUM SERPL-MCNC: 7 MG/DL (ref 8.5–10.5)
CHLORIDE SERPL-SCNC: 106 MMOL/L (ref 96–112)
CO2 BLDA-SCNC: 24 MMOL/L (ref 20–33)
CO2 SERPL-SCNC: 19 MMOL/L (ref 20–33)
CREAT SERPL-MCNC: 1.09 MG/DL (ref 0.5–1.4)
DELSYS IDSYS: ABNORMAL
END TIDAL CARBON DIOXIDE IECO2: 38 MMHG
EOSINOPHIL # BLD AUTO: 0.01 K/UL (ref 0–0.51)
EOSINOPHIL NFR BLD: 0.1 % (ref 0–6.9)
ERYTHROCYTE [DISTWIDTH] IN BLOOD BY AUTOMATED COUNT: 51.7 FL (ref 35.9–50)
GFR SERPLBLD CREATININE-BSD FMLA CKD-EPI: 86 ML/MIN/1.73 M 2
GLOBULIN SER CALC-MCNC: 2.6 G/DL (ref 1.9–3.5)
GLUCOSE BLD STRIP.AUTO-MCNC: 130 MG/DL (ref 65–99)
GLUCOSE BLD STRIP.AUTO-MCNC: 134 MG/DL (ref 65–99)
GLUCOSE BLD STRIP.AUTO-MCNC: 150 MG/DL (ref 65–99)
GLUCOSE BLD STRIP.AUTO-MCNC: 177 MG/DL (ref 65–99)
GLUCOSE SERPL-MCNC: 172 MG/DL (ref 65–99)
HCO3 BLDA-SCNC: 22.5 MMOL/L (ref 17–25)
HCT VFR BLD AUTO: 43.9 % (ref 42–52)
HGB BLD-MCNC: 14.2 G/DL (ref 14–18)
HOROWITZ INDEX BLDA+IHG-RTO: 227 MM[HG]
IMM GRANULOCYTES # BLD AUTO: 0.18 K/UL (ref 0–0.11)
IMM GRANULOCYTES NFR BLD AUTO: 0.9 % (ref 0–0.9)
LYMPHOCYTES # BLD AUTO: 0.91 K/UL (ref 1–4.8)
LYMPHOCYTES NFR BLD: 4.8 % (ref 22–41)
MCH RBC QN AUTO: 30.6 PG (ref 27–33)
MCHC RBC AUTO-ENTMCNC: 32.3 G/DL (ref 33.7–35.3)
MCV RBC AUTO: 94.6 FL (ref 81.4–97.8)
MODE IMODE: ABNORMAL
MONOCYTES # BLD AUTO: 1.64 K/UL (ref 0–0.85)
MONOCYTES NFR BLD AUTO: 8.6 % (ref 0–13.4)
NEUTROPHILS # BLD AUTO: 16.29 K/UL (ref 1.82–7.42)
NEUTROPHILS NFR BLD: 85.3 % (ref 44–72)
NRBC # BLD AUTO: 0 K/UL
NRBC BLD-RTO: 0 /100 WBC
O2/TOTAL GAS SETTING VFR VENT: 60 %
PCO2 BLDA: 42 MMHG (ref 26–37)
PCO2 TEMP ADJ BLDA: 43.4 MMHG (ref 26–37)
PEEP END EXPIRATORY PRESSURE IPEEP: 8 CMH20
PH BLDA: 7.34 [PH] (ref 7.4–7.5)
PH TEMP ADJ BLDA: 7.33 [PH] (ref 7.4–7.5)
PLATELET # BLD AUTO: 191 K/UL (ref 164–446)
PMV BLD AUTO: 10.6 FL (ref 9–12.9)
PO2 BLDA: 136 MMHG (ref 64–87)
PO2 TEMP ADJ BLDA: 141 MMHG (ref 64–87)
POTASSIUM SERPL-SCNC: 5.5 MMOL/L (ref 3.6–5.5)
PROT SERPL-MCNC: 6.1 G/DL (ref 6–8.2)
RBC # BLD AUTO: 4.64 M/UL (ref 4.7–6.1)
SAO2 % BLDA: 99 % (ref 93–99)
SODIUM SERPL-SCNC: 141 MMOL/L (ref 135–145)
SPECIMEN DRAWN FROM PATIENT: ABNORMAL
TIDAL VOLUME IVT: 440 ML
TRIGL SERPL-MCNC: 262 MG/DL (ref 0–149)
WBC # BLD AUTO: 19.1 K/UL (ref 4.8–10.8)

## 2023-05-17 PROCEDURE — 160042 HCHG SURGERY MINUTES - EA ADDL 1 MIN LEVEL 5: Performed by: NEUROLOGICAL SURGERY

## 2023-05-17 PROCEDURE — 72080 X-RAY EXAM THORACOLMB 2/> VW: CPT

## 2023-05-17 PROCEDURE — 82962 GLUCOSE BLOOD TEST: CPT | Mod: 91

## 2023-05-17 PROCEDURE — 73030 X-RAY EXAM OF SHOULDER: CPT | Mod: LT

## 2023-05-17 PROCEDURE — A9270 NON-COVERED ITEM OR SERVICE: HCPCS | Performed by: SURGERY

## 2023-05-17 PROCEDURE — 99140 ANES COMP EMERGENCY COND: CPT | Performed by: ANESTHESIOLOGY

## 2023-05-17 PROCEDURE — 160031 HCHG SURGERY MINUTES - 1ST 30 MINS LEVEL 5: Performed by: NEUROLOGICAL SURGERY

## 2023-05-17 PROCEDURE — 82803 BLOOD GASES ANY COMBINATION: CPT

## 2023-05-17 PROCEDURE — 00670 ANES XTNSV SP&SPI CORD PX: CPT | Performed by: ANESTHESIOLOGY

## 2023-05-17 PROCEDURE — 85025 COMPLETE CBC W/AUTO DIFF WBC: CPT

## 2023-05-17 PROCEDURE — 700105 HCHG RX REV CODE 258: Performed by: SURGERY

## 2023-05-17 PROCEDURE — 160048 HCHG OR STATISTICAL LEVEL 1-5: Performed by: NEUROLOGICAL SURGERY

## 2023-05-17 PROCEDURE — 700111 HCHG RX REV CODE 636 W/ 250 OVERRIDE (IP): Performed by: NEUROLOGICAL SURGERY

## 2023-05-17 PROCEDURE — 73590 X-RAY EXAM OF LOWER LEG: CPT | Mod: RT

## 2023-05-17 PROCEDURE — 36620 INSERTION CATHETER ARTERY: CPT

## 2023-05-17 PROCEDURE — 700101 HCHG RX REV CODE 250: Performed by: SURGERY

## 2023-05-17 PROCEDURE — 700111 HCHG RX REV CODE 636 W/ 250 OVERRIDE (IP): Performed by: ANESTHESIOLOGY

## 2023-05-17 PROCEDURE — 73590 X-RAY EXAM OF LOWER LEG: CPT | Mod: LT

## 2023-05-17 PROCEDURE — 84478 ASSAY OF TRIGLYCERIDES: CPT

## 2023-05-17 PROCEDURE — 110454 HCHG SHELL REV 250: Performed by: NEUROLOGICAL SURGERY

## 2023-05-17 PROCEDURE — 700111 HCHG RX REV CODE 636 W/ 250 OVERRIDE (IP): Performed by: SURGERY

## 2023-05-17 PROCEDURE — 700102 HCHG RX REV CODE 250 W/ 637 OVERRIDE(OP): Performed by: SURGERY

## 2023-05-17 PROCEDURE — 700105 HCHG RX REV CODE 258: Performed by: ANESTHESIOLOGY

## 2023-05-17 PROCEDURE — 97535 SELF CARE MNGMENT TRAINING: CPT

## 2023-05-17 PROCEDURE — 160009 HCHG ANES TIME/MIN: Performed by: NEUROLOGICAL SURGERY

## 2023-05-17 PROCEDURE — 71045 X-RAY EXAM CHEST 1 VIEW: CPT

## 2023-05-17 PROCEDURE — 97167 OT EVAL HIGH COMPLEX 60 MIN: CPT

## 2023-05-17 PROCEDURE — 700101 HCHG RX REV CODE 250: Performed by: NEUROLOGICAL SURGERY

## 2023-05-17 PROCEDURE — 94150 VITAL CAPACITY TEST: CPT

## 2023-05-17 PROCEDURE — 94003 VENT MGMT INPAT SUBQ DAY: CPT

## 2023-05-17 PROCEDURE — 110371 HCHG SHELL REV 272: Performed by: NEUROLOGICAL SURGERY

## 2023-05-17 PROCEDURE — 97162 PT EVAL MOD COMPLEX 30 MIN: CPT

## 2023-05-17 PROCEDURE — 80053 COMPREHEN METABOLIC PANEL: CPT

## 2023-05-17 PROCEDURE — 770022 HCHG ROOM/CARE - ICU (200)

## 2023-05-17 PROCEDURE — 36600 WITHDRAWAL OF ARTERIAL BLOOD: CPT

## 2023-05-17 PROCEDURE — 94799 UNLISTED PULMONARY SVC/PX: CPT

## 2023-05-17 PROCEDURE — C1713 ANCHOR/SCREW BN/BN,TIS/BN: HCPCS | Performed by: NEUROLOGICAL SURGERY

## 2023-05-17 PROCEDURE — 700101 HCHG RX REV CODE 250: Performed by: ANESTHESIOLOGY

## 2023-05-17 PROCEDURE — 99291 CRITICAL CARE FIRST HOUR: CPT | Performed by: SURGERY

## 2023-05-17 PROCEDURE — 73630 X-RAY EXAM OF FOOT: CPT | Mod: LT

## 2023-05-17 PROCEDURE — 700105 HCHG RX REV CODE 258: Performed by: NEUROLOGICAL SURGERY

## 2023-05-17 PROCEDURE — 73630 X-RAY EXAM OF FOOT: CPT | Mod: RT

## 2023-05-17 RX ORDER — CEFAZOLIN SODIUM 1 G/3ML
INJECTION, POWDER, FOR SOLUTION INTRAMUSCULAR; INTRAVENOUS PRN
Status: DISCONTINUED | OUTPATIENT
Start: 2023-05-17 | End: 2023-05-17 | Stop reason: SURG

## 2023-05-17 RX ORDER — OXYCODONE HYDROCHLORIDE 10 MG/1
10 TABLET ORAL
Status: DISCONTINUED | OUTPATIENT
Start: 2023-05-17 | End: 2023-05-19

## 2023-05-17 RX ORDER — SODIUM CHLORIDE, SODIUM LACTATE, POTASSIUM CHLORIDE, CALCIUM CHLORIDE 600; 310; 30; 20 MG/100ML; MG/100ML; MG/100ML; MG/100ML
INJECTION, SOLUTION INTRAVENOUS
Status: DISCONTINUED | OUTPATIENT
Start: 2023-05-17 | End: 2023-05-17 | Stop reason: SURG

## 2023-05-17 RX ORDER — CEFAZOLIN SODIUM 1 G/3ML
INJECTION, POWDER, FOR SOLUTION INTRAMUSCULAR; INTRAVENOUS
Status: DISCONTINUED | OUTPATIENT
Start: 2023-05-17 | End: 2023-05-17 | Stop reason: HOSPADM

## 2023-05-17 RX ORDER — OXYCODONE HYDROCHLORIDE 5 MG/1
5 TABLET ORAL
Status: DISCONTINUED | OUTPATIENT
Start: 2023-05-17 | End: 2023-05-17

## 2023-05-17 RX ORDER — HYDROMORPHONE HYDROCHLORIDE 1 MG/ML
1 INJECTION, SOLUTION INTRAMUSCULAR; INTRAVENOUS; SUBCUTANEOUS
Status: DISCONTINUED | OUTPATIENT
Start: 2023-05-17 | End: 2023-05-19

## 2023-05-17 RX ORDER — TESTOSTERONE CYPIONATE 200 MG/ML
200 INJECTION, SOLUTION INTRAMUSCULAR ONCE
Status: ON HOLD | COMMUNITY
End: 2023-05-23

## 2023-05-17 RX ORDER — HYDROMORPHONE HYDROCHLORIDE 1 MG/ML
0.5 INJECTION, SOLUTION INTRAMUSCULAR; INTRAVENOUS; SUBCUTANEOUS
Status: DISCONTINUED | OUTPATIENT
Start: 2023-05-17 | End: 2023-05-17

## 2023-05-17 RX ORDER — ROCURONIUM BROMIDE 10 MG/ML
INJECTION, SOLUTION INTRAVENOUS PRN
Status: DISCONTINUED | OUTPATIENT
Start: 2023-05-17 | End: 2023-05-17 | Stop reason: SURG

## 2023-05-17 RX ORDER — OXYCODONE HYDROCHLORIDE 5 MG/1
5 TABLET ORAL
Status: DISCONTINUED | OUTPATIENT
Start: 2023-05-17 | End: 2023-05-19

## 2023-05-17 RX ORDER — MIDAZOLAM HYDROCHLORIDE 1 MG/ML
INJECTION INTRAMUSCULAR; INTRAVENOUS PRN
Status: DISCONTINUED | OUTPATIENT
Start: 2023-05-17 | End: 2023-05-17 | Stop reason: SURG

## 2023-05-17 RX ORDER — BUPIVACAINE HYDROCHLORIDE AND EPINEPHRINE 5; 5 MG/ML; UG/ML
INJECTION, SOLUTION EPIDURAL; INTRACAUDAL; PERINEURAL
Status: DISCONTINUED | OUTPATIENT
Start: 2023-05-17 | End: 2023-05-17 | Stop reason: HOSPADM

## 2023-05-17 RX ORDER — OXYCODONE HYDROCHLORIDE 10 MG/1
10 TABLET ORAL
Status: DISCONTINUED | OUTPATIENT
Start: 2023-05-17 | End: 2023-05-17

## 2023-05-17 RX ADMIN — PROPOFOL 40 MCG/KG/MIN: 10 INJECTION, EMULSION INTRAVENOUS at 05:49

## 2023-05-17 RX ADMIN — LIDOCAINE 1 PATCH: 50 PATCH TOPICAL at 05:23

## 2023-05-17 RX ADMIN — SODIUM CHLORIDE, POTASSIUM CHLORIDE, SODIUM LACTATE AND CALCIUM CHLORIDE: 600; 310; 30; 20 INJECTION, SOLUTION INTRAVENOUS at 13:31

## 2023-05-17 RX ADMIN — SODIUM CHLORIDE, POTASSIUM CHLORIDE, SODIUM LACTATE AND CALCIUM CHLORIDE: 600; 310; 30; 20 INJECTION, SOLUTION INTRAVENOUS at 21:53

## 2023-05-17 RX ADMIN — OXYCODONE HYDROCHLORIDE 10 MG: 10 TABLET ORAL at 10:53

## 2023-05-17 RX ADMIN — MIDAZOLAM 2 MG: 1 INJECTION, SOLUTION INTRAMUSCULAR; INTRAVENOUS at 00:03

## 2023-05-17 RX ADMIN — HYDROMORPHONE HYDROCHLORIDE 0.5 MG: 1 INJECTION, SOLUTION INTRAMUSCULAR; INTRAVENOUS; SUBCUTANEOUS at 11:23

## 2023-05-17 RX ADMIN — FAMOTIDINE 20 MG: 10 INJECTION INTRAVENOUS at 05:01

## 2023-05-17 RX ADMIN — CEFAZOLIN 2 G: 2 INJECTION, POWDER, FOR SOLUTION INTRAMUSCULAR; INTRAVENOUS at 21:29

## 2023-05-17 RX ADMIN — DOCUSATE SODIUM 100 MG: 50 LIQUID ORAL at 17:45

## 2023-05-17 RX ADMIN — SODIUM CHLORIDE, POTASSIUM CHLORIDE, SODIUM LACTATE AND CALCIUM CHLORIDE: 600; 310; 30; 20 INJECTION, SOLUTION INTRAVENOUS at 00:03

## 2023-05-17 RX ADMIN — SENNOSIDES AND DOCUSATE SODIUM 1 TABLET: 50; 8.6 TABLET ORAL at 21:19

## 2023-05-17 RX ADMIN — INSULIN HUMAN 1 UNITS: 100 INJECTION, SOLUTION PARENTERAL at 04:58

## 2023-05-17 RX ADMIN — ROCURONIUM BROMIDE 50 MG: 50 INJECTION, SOLUTION INTRAVENOUS at 01:26

## 2023-05-17 RX ADMIN — HYDROMORPHONE HYDROCHLORIDE 0.5 MG: 1 INJECTION, SOLUTION INTRAMUSCULAR; INTRAVENOUS; SUBCUTANEOUS at 14:12

## 2023-05-17 RX ADMIN — POLYETHYLENE GLYCOL 3350 1 PACKET: 17 POWDER, FOR SOLUTION ORAL at 17:45

## 2023-05-17 RX ADMIN — FENTANYL CITRATE 50 MCG: 50 INJECTION, SOLUTION INTRAMUSCULAR; INTRAVENOUS at 01:23

## 2023-05-17 RX ADMIN — OXYCODONE HYDROCHLORIDE 10 MG: 10 TABLET ORAL at 17:45

## 2023-05-17 RX ADMIN — ACETAMINOPHEN 1000 MG: 500 TABLET, FILM COATED ORAL at 11:23

## 2023-05-17 RX ADMIN — CEFAZOLIN 2 G: 1 INJECTION, POWDER, FOR SOLUTION INTRAMUSCULAR; INTRAVENOUS at 00:10

## 2023-05-17 RX ADMIN — ACETAMINOPHEN 1000 MG: 500 TABLET, FILM COATED ORAL at 17:45

## 2023-05-17 RX ADMIN — CEFAZOLIN 2 G: 2 INJECTION, POWDER, FOR SOLUTION INTRAMUSCULAR; INTRAVENOUS at 08:13

## 2023-05-17 RX ADMIN — HYDROMORPHONE HYDROCHLORIDE 0.5 MG: 1 INJECTION, SOLUTION INTRAMUSCULAR; INTRAVENOUS; SUBCUTANEOUS at 08:02

## 2023-05-17 RX ADMIN — PROPOFOL 50 MCG/KG/MIN: 10 INJECTION, EMULSION INTRAVENOUS at 04:07

## 2023-05-17 RX ADMIN — FENTANYL CITRATE 100 MCG: 50 INJECTION, SOLUTION INTRAMUSCULAR; INTRAVENOUS at 00:54

## 2023-05-17 RX ADMIN — ROCURONIUM BROMIDE 50 MG: 50 INJECTION, SOLUTION INTRAVENOUS at 00:06

## 2023-05-17 RX ADMIN — CEFAZOLIN 2 G: 2 INJECTION, POWDER, FOR SOLUTION INTRAMUSCULAR; INTRAVENOUS at 13:56

## 2023-05-17 RX ADMIN — NOREPINEPHRINE BITARTRATE 0.05 MCG/KG/MIN: 1 INJECTION, SOLUTION, CONCENTRATE INTRAVENOUS at 08:11

## 2023-05-17 RX ADMIN — FENTANYL CITRATE 100 MCG: 50 INJECTION, SOLUTION INTRAMUSCULAR; INTRAVENOUS at 00:06

## 2023-05-17 RX ADMIN — ACETAMINOPHEN 1000 MG: 500 TABLET, FILM COATED ORAL at 23:06

## 2023-05-17 RX ADMIN — FAMOTIDINE 20 MG: 20 TABLET, FILM COATED ORAL at 17:45

## 2023-05-17 RX ADMIN — HYDROMORPHONE HYDROCHLORIDE 1 MG: 1 INJECTION, SOLUTION INTRAMUSCULAR; INTRAVENOUS; SUBCUTANEOUS at 18:30

## 2023-05-17 RX ADMIN — ROCURONIUM BROMIDE 50 MG: 50 INJECTION, SOLUTION INTRAVENOUS at 00:36

## 2023-05-17 RX ADMIN — OXYCODONE HYDROCHLORIDE 10 MG: 10 TABLET ORAL at 21:19

## 2023-05-17 RX ADMIN — OXYCODONE HYDROCHLORIDE 10 MG: 10 TABLET ORAL at 13:54

## 2023-05-17 RX ADMIN — ROCURONIUM BROMIDE 50 MG: 50 INJECTION, SOLUTION INTRAVENOUS at 02:33

## 2023-05-17 RX ADMIN — SODIUM CHLORIDE, POTASSIUM CHLORIDE, SODIUM LACTATE AND CALCIUM CHLORIDE: 600; 310; 30; 20 INJECTION, SOLUTION INTRAVENOUS at 04:14

## 2023-05-17 ASSESSMENT — LIFESTYLE VARIABLES
TOTAL SCORE: 4
DOES PATIENT WANT TO TALK TO SOMEONE ABOUT QUITTING: YES
AVERAGE NUMBER OF DAYS PER WEEK YOU HAVE A DRINK CONTAINING ALCOHOL: 7
TOTAL SCORE: 4
CONSUMPTION TOTAL: POSITIVE
EVER HAD A DRINK FIRST THING IN THE MORNING TO STEADY YOUR NERVES TO GET RID OF A HANGOVER: YES
DOES PATIENT WANT TO STOP DRINKING: YES
HAVE YOU EVER FELT YOU SHOULD CUT DOWN ON YOUR DRINKING: YES
TOTAL SCORE: 4
HOW MANY TIMES IN THE PAST YEAR HAVE YOU HAD 5 OR MORE DRINKS IN A DAY: 50
ON A TYPICAL DAY WHEN YOU DRINK ALCOHOL HOW MANY DRINKS DO YOU HAVE: 6
EVER FELT BAD OR GUILTY ABOUT YOUR DRINKING: YES
ALCOHOL_USE: YES
HAVE PEOPLE ANNOYED YOU BY CRITICIZING YOUR DRINKING: YES

## 2023-05-17 ASSESSMENT — COPD QUESTIONNAIRES
HAVE YOU SMOKED AT LEAST 100 CIGARETTES IN YOUR ENTIRE LIFE: NO/DON'T KNOW
DURING THE PAST 4 WEEKS HOW MUCH DID YOU FEEL SHORT OF BREATH: NONE/LITTLE OF THE TIME
DO YOU EVER COUGH UP ANY MUCUS OR PHLEGM?: NO/ONLY WITH OCCASIONAL COLDS OR INFECTIONS
COPD SCREENING SCORE: 0

## 2023-05-17 ASSESSMENT — COGNITIVE AND FUNCTIONAL STATUS - GENERAL
MOVING FROM LYING ON BACK TO SITTING ON SIDE OF FLAT BED: UNABLE
SUGGESTED CMS G CODE MODIFIER MOBILITY: CM
MOBILITY SCORE: 8
DRESSING REGULAR LOWER BODY CLOTHING: A LOT
PERSONAL GROOMING: A LITTLE
EATING MEALS: A LITTLE
STANDING UP FROM CHAIR USING ARMS: A LOT
TOILETING: A LOT
WALKING IN HOSPITAL ROOM: A LOT
DAILY ACTIVITIY SCORE: 14
HELP NEEDED FOR BATHING: A LOT
DRESSING REGULAR UPPER BODY CLOTHING: A LOT
SUGGESTED CMS G CODE MODIFIER DAILY ACTIVITY: CK
TURNING FROM BACK TO SIDE WHILE IN FLAT BAD: UNABLE
MOVING TO AND FROM BED TO CHAIR: UNABLE
CLIMB 3 TO 5 STEPS WITH RAILING: TOTAL

## 2023-05-17 ASSESSMENT — FIBROSIS 4 INDEX: FIB4 SCORE: 2.17

## 2023-05-17 ASSESSMENT — PATIENT HEALTH QUESTIONNAIRE - PHQ9
SUM OF ALL RESPONSES TO PHQ9 QUESTIONS 1 AND 2: 0
2. FEELING DOWN, DEPRESSED, IRRITABLE, OR HOPELESS: NOT AT ALL
1. LITTLE INTEREST OR PLEASURE IN DOING THINGS: NOT AT ALL

## 2023-05-17 ASSESSMENT — PULMONARY FUNCTION TESTS: FVC: 2

## 2023-05-17 ASSESSMENT — PAIN SCALES - GENERAL: PAIN_LEVEL: 0

## 2023-05-17 ASSESSMENT — ACTIVITIES OF DAILY LIVING (ADL): TOILETING: INDEPENDENT

## 2023-05-17 ASSESSMENT — PAIN DESCRIPTION - PAIN TYPE: TYPE: ACUTE PAIN;SURGICAL PAIN

## 2023-05-17 ASSESSMENT — GAIT ASSESSMENTS: GAIT LEVEL OF ASSIST: UNABLE TO PARTICIPATE

## 2023-05-17 NOTE — ANESTHESIA PREPROCEDURE EVALUATION
Case: [id number] Date/Time: 05/16/23 2145    Procedures:       LAMINECTOMY, SPINE, LUMBAR, WITH DISCECTOMY      FUSION, SPINE, THORACIC, POSTERIOR APPROACH, WITH O-ARM IMAGING GUIDANCE T11-L1    Anesthesia type: General    Location: [facility] OR 05 / SURGERY [facility]    Surgeons: Vita Barger M.D.          Relevant Problems   No relevant active problems       Physical Exam    Airway   Mallampati: II  TM distance: >3 FB  Neck ROM: full       Cardiovascular - normal exam  Rhythm: regular  Rate: normal  (-) murmur     Dental - normal exam           Pulmonary - normal exam  Breath sounds clear to auscultation     Abdominal    Neurological - normal exam                 Anesthesia Plan    ASA 4- EMERGENT   ASA physical status 4 criteria: acute alcohol or substance abuse withdrawal and shockASA physical status emergent criteria: displaced fracture with possible neurovascular compromise and compromised vital organ, limb or tissue    Plan - general       Airway plan will be ETT          Induction: intravenous    Postoperative Plan: Postoperative administration of opioids is intended.    Pertinent diagnostic labs and testing reviewed    Informed Consent:    Anesthetic plan and risks discussed with patient.    Use of blood products discussed with: patient whom consented to blood products.

## 2023-05-17 NOTE — DISCHARGE PLANNING
[facility] Transitional [facility] Coordination    Referral from:  Dr. 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    D/C support: will need to verify d/c support      Physiatry consultation pended per protocol.   Will need therapy notes when medically appropriate.         Thank you for the referral.

## 2023-05-17 NOTE — PROGRESS NOTES
2223: XR at bedside for confirmatory XR for central line placement.    2230: Received call from Dr. Charbel redd MD, plan to take patient straight from MRI to OR. Placed call to OR circulator, Ju, to inform of this plan. Per Ju, send Voalte message when MRI close to finished so MRI staff can retrieve pt from imaging for surgery. Awaiting transport staff.    2241: Pt to MRI.    2338: Contacted Ju via Voalte message, MRI with 10-15 mins left.     2353: Pt to OR from MRI. VSS throughout imaging, goal MAP 85+ maintained without vasopressors.
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
## 2023-05-17 NOTE — ANESTHESIA TIME REPORT
Anesthesia Start and Stop Event Times     Date Time Event    5/16/2023 2304 Ready for Procedure    5/17/2023 0003 Anesthesia Start     0308 Anesthesia Stop        Responsible Staff  05/17/23    Name Role Begin End    Devin Burgos M.D. Anesth 0003 [id number]        Overtime Reason:  no overtime (within assigned shift)    Comments: Loretta 1st call
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
## 2023-05-17 NOTE — PROGRESS NOTES
Telephone notification that patient will be departing OR in ~20-30 mins received from Ju, OR circulator. Pt then anticipated back to T917.

## 2023-05-17 NOTE — PROGRESS NOTES
Neurosurgery Progress Note    Subjective:  Kept intubated overnight.  H&H stable    Exam:    Intubated, sedated  Opens eyes to voice, regards, follows commands  Motor exam 5/5 in BUE  RLE                 LLE  Iliopsoas                         3/5                   3/5  Quadriceps                     4/5                   4/5   Dorsiflexion                   0/5                    0/5  Eversion                        0/5                    0/5   EHL                               0/5                    0/5  Plantarflexion                 0/5                   0/5  Hamstrings                     2/5                  2/5    Posterior thoracic incision dressed with Prevena. Subfascial HVx2.    Recent Labs     05/16/23 1825 05/17/23  0445   WBC 13.9* 19.1*   RBC 4.54* 4.64*   HEMOGLOBIN 14.2 14.2   HEMATOCRIT 43.3 43.9   MCV 95.4 94.6   MCH 31.3 30.6   MCHC 32.8* 32.3*   RDW 51.1* 51.7*   PLATELETCT 187 191   MPV 10.3 10.6     Recent Labs     05/16/23 1825 05/17/23  0445   SODIUM 143 141   POTASSIUM 3.9 5.5   CHLORIDE 109 106   CO2 17* 19*   GLUCOSE 182* 172*   BUN 8 8   CREATININE 1.04 1.09   CALCIUM 8.0* 7.0*     Recent Labs     05/16/23 1825   APTT 23.1*   INR 1.04     Recent Labs     05/16/23 1825   REACTMIN 4.2*   CLOTKINET 1.6   CLOTANGL 70.9   MAXCLOTS 56.5   RNY86DEX 0.2   PRCINADP 66.5*   PRCINAA 3.1       Assessment and Plan:  Patient is hospital day # 2, POD# 1, s/p T11-T12 laminectomy, T11-L1 posterior instrumented fusion, ROBERTO C T12 level spinal cord injury.    - Continue ICU care, q4h neuro checks.  - MAP > 85mmHg x5d (stop 5/21)  - Continue drain to self-suction.  - May clear C-collar clinically once patient is cooperative.  Patient may mobilize with TLSO brace when upright and out of bed.  - Remove Prevena when battery runs out  - wean to extubate as tolerated.  - Hold pharmacological DVT prophylaxis at this time.    Please call with questions.    Vita Barger M.D.

## 2023-05-17 NOTE — PROGRESS NOTES
0300: Pt from OR to T917 accompanied by OR RN and anesthesiologist on transport zoll, ventilations via BVM. Per report, multiple attempts made at arterial line in OR without success.     Gtts infusing on arrival (see MAR for titrations): Propofol at 40 mcg/kg/min    VS on arrival:  HR: 115 BPM (ST)  BP (cuff): 115/57  RR: 20 (mechanical)  SpO2: 98% (vented)  Temp (Ramon): 99.5F  Weight (bed scale): 113.5kg    4 Eyes Skin Assessment Completed by Erica PAYAN RN and Jensen HUA RN.  Head WDL. Ears WDL. Eyes Scleral edema. Nose WDL - scant bloody drainage from nares.  Mouth WDL. Neck Redness and Blanching. Mepilexes placed circumferentially around c-collar.   Breast/Chest Redness and Blanching.  Shoulder Blades WDL.  Spine Incision (Midline incision to back w/ Prevena drain over entirety of incision; 2 hemovacs in place jasper-incision, terminating on either side of superior portion of Prevena sponge.   (R) Arm/Elbow/Hand Bruising (scattered). (L) Arm/Elbow/Hand Bruising (scattered).  Abdomen WDL. Groin WDL. Scrotum/Coccyx/Buttocks WDL.  (R) Leg Bruising (scattered).  (L) Leg Scar and Bruising (scar to lateral thigh, bruising to lower leg).  (R) Heel/Foot/Toe Redness and Blanching to heel.  (L) Heel/Foot/Toe Redness and Blanching to heel. Mild bruising to dorsal foot.    Devices In Places ECG, Blood Pressure Cuff, Pulse Ox, Ramon, SCD's, ET Tube, OG/NG, Central Line, and Cervical Collar  Interventions In Place Sacral Mepilex, TAP System, Pillows, Q2 Turns, Low Air Loss Mattress, and Heels Loaded W/Pillows  Possible Skin Injury No - all traumatic appearing.  Pictures Uploaded Into Epic N/A.  Wound Consult Placed N/A.  RN Wound Prevention Protocol Ordered No .    0326: RENATA Boykin bedside to place arterial line.   0331: Arterial line start.   0350: Arterial line procedure complete.

## 2023-05-17 NOTE — PROGRESS NOTES
2120: Pt arrived from BL17 accompanied by ED RN x2. Attached to overhead monitor:   HR: 109 bpm (ST)  BP (cuff): 140/86 (108)  SpO2: 96% (15L oxymask)  RR: 33  T: 97.1F    2131: Dr. Traylor at the bedside, preparing for intubation. RSI meds given as ordered by MD:   -80 mg Propofol (@2132)  -100 mg succinylcholine (@2133)    2136: 8.0 tube, 24 cm at teeth successfully inserted. + color change and breath sounds. 50 mg rocuronium ordered by MD. Given at this time.     2139: MD Traylor bedside for CVC insertion. Lucretia YANEZ bedside for arterial line insertion simultaneously. Time out performed.    2149: /113 - 1mg dilaudid given per MD direction.     2200: Arterial line unsuccessful x2 - hold on procedure per MD and take to MRI STAT.    2201: 50 mg rocuronium ordered and given pre-MRI.
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
## 2023-05-17 NOTE — ASSESSMENT & PLAN NOTE
Slight widening of the anterior disc space at C6-7 raises concern for ligamentous injury.  MRI with C5-7 minimal bulge disc without foramen stenosis.  5/17 Cervical spine cleared by neurosurgery, collar removed.
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
## 2023-05-17 NOTE — ASSESSMENT & PLAN NOTE
5/17 Rehab consult placed.  5/18 Accepted by [facility], insurance authorization pending.
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
## 2023-05-17 NOTE — PROGRESS NOTES
Neurosurgery Note    MRI C-T-L spine reviewed.  - T12 burst fracture, retropulsion into canal with cord contusion, edema from T11 to L1. Plan for emergent OR for T11-L1 laminectomy, posterior instrumented fusion. No family available for consent.  - No C or L spine injury    Vita Barger M.D.
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
## 2023-05-17 NOTE — PROGRESS NOTES
12-hour chart check.    Shift monitor summary: ST, rate 100's-160's. No notable ectopy. See strip below for interval measurements.

## 2023-05-17 NOTE — ASSESSMENT & PLAN NOTE
Bilateral pulmonary contusions.  Supplemental oxygen as needed to maintain SpO2 greater than 94%.  Aggressive pulmonary hygiene and serial chest radiography.
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
## 2023-05-17 NOTE — OP REPORT
DATE OF OPERATION:  5/16/2023    PREOPERATIVE DIAGNOSIS:  Acute respiratory failure, blunt chest trauma with hypoxia    POSTOPERATIVE DIAGNOSIS: Acute respiratory failure, blunt chest trauma with hypoxia    PROCEDURE PERFORMED:  Rapid sequence endotracheal intubation.    SURGEON:   Fady Traylor M.D.    INDICATIONS: The patient is a 42 year-old man with acute respiratory failure, blunt chest trauma with hypoxia, and urgent need for MR imaging of cervical and thoracic spine injuries.  Rapid sequence endotracheal intubation is carried out at the bedside to secure a definitve airway.     PROCEDURE: Following implied emergent consent consent, the patient was properly identified and optimally positioned in bed. He was preoxygenated with 100% oxygen. A rapid sequence induction consisting of propofol and succinyl choline was administered intravenously.    The vocal cords were visualized transorally with a [place] Scope. A cuffed 8 endotracheal tube was passed and the cuff inflated. End tidal CO2 monitoring confirmed return of carbon dioxide. The tube was secured.     The patient tolerated the procedure well. There were no apparent complications.         ____________________________________     Fady Traylor M.D.    DD: 5/16/2023  10:05 PM
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
## 2023-05-17 NOTE — ED NOTES
Pt transported via [facility] to [id number], Report called to Erica RN, pt states understanding of POC

## 2023-05-17 NOTE — PROGRESS NOTES
DATE OF OPERATION:  5/17/2023    PREOPERATIVE DIAGNOSIS:  spinal cord injury.     PROCEDURE PERFORMED: Right radial artery catheter placement.    APRN:   AB Holland    INDICATIONS: The patient is a 42 year-old man with spinal cord injury. A radial arterial line is placed at the bedside.     PROCEDURE: The patient was properly identified and optimally positioned. Intravenous sedation and analgesia was administered. The procedural site was prepped with ChloraPrep® and draped in a standard fashion. Full barrier precautions were employed. The right radial artery was accessed percutaneously and a wire was advanced without resistance. A single lumen arterial catheter was passed using sterile Seldinger technique. The flexible guide wire was removed intact. The catheter was connected to the invasive hemodynamic monitoring apparatus. A satisfactory arterial waveform was observed. The catheter was secured to the skin with silk sutures.  A sterile dressing was applied. The patient tolerated the procedure well. There were no apparent complications.        ____________________________________     LILIA Holland.    DD: 5/17/2023  4:02 AM

## 2023-05-17 NOTE — ASSESSMENT & PLAN NOTE
Right transverse process fractures at L1-L3 and left transverse process fracture at L1. Spinous process fracture at L1.  Non operative management.  Analgesia.
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
## 2023-05-17 NOTE — ANESTHESIA POSTPROCEDURE EVALUATION
Patient: Madhu Forty-Two    Procedure Summary     Date: 05/17/23 Room / Location: [facility] 05 / SURGERY [facility]    Anesthesia Start: 0003 Anesthesia Stop: 0308    Procedures:       LAMINECTOMY, SPINE, LUMBAR, WITH DISCECTOMY (Back)      FUSION, SPINE, THORACIC, POSTERIOR APPROACH, WITH O-ARM IMAGING GUIDANCE T11-L1 (Back) Diagnosis: (unstable T12 burst fracture with involvement of posterior column)    Surgeons: Vita Barger M.D. Responsible Provider: Devin Burgos M.D.    Anesthesia Type: general ASA Status: 4 - Emergent          Final Anesthesia Type: general  Last vitals  BP   Blood Pressure: 117/74    Temp   36.2 °C (97.1 °F)    Pulse   (!) 133   Resp   (!) 23    SpO2   98 %      Anesthesia Post Evaluation    Patient location during evaluation: ICU  Patient participation: complete - patient cannot participate  Level of consciousness: obtunded/minimal responses  Pain score: 0    Airway patency: patent  Anesthetic complications: no  Cardiovascular status: hemodynamically stable  Respiratory status: ETT  Hydration status: euvolemic    PONV: none          No notable events documented.

## 2023-05-17 NOTE — ASSESSMENT & PLAN NOTE
Prophylactic anticoagulation for thrombotic prevention initially contraindicated secondary to elevated bleeding risk.  5/18 Trauma screening bilateral lower extremity venous duplex negative for above knee DVT.  5/19 Prophylactic dose enoxaparin initiated.

## 2023-05-17 NOTE — CARE PLAN
The patient is Unstable - High likelihood or risk of patient condition declining or worsening.    Shift Goals:  Clinical Goals: Maintain hemodynamic parameters (MAP >85), improved neurologic examination, MRI/surgery tonight  Patient Goals: Unable to assess r/t altered mental status  Family Goals: No family present at this time    Progress made toward(s) clinical / shift goals:    Problem: Safety - Medical Restraint  Goal: Remains free of injury from restraints (Restraint for Interference with Medical Device)  Outcome: Progressing     Problem: Pain - Standard  Goal: Alleviation of pain or a reduction in pain to the patient’s comfort goal  Outcome: Progressing    Patient is not progressing towards the following goals:  Problem: Safety - Medical Restraint  Goal: Free from restraint(s) (Restraint for Interference with Medical Device)  Outcome: Not Progressing     Problem: Knowledge Deficit - Standard  Goal: Patient and family/care givers will demonstrate understanding of plan of care, disease process/condition, diagnostic tests and medications  Outcome: Not Progressing

## 2023-05-17 NOTE — ASSESSMENT & PLAN NOTE
Right 1st, 8th and 9th posterior rib fractures. 9th rib fracture is comminuted and displaced.  Aggressive pulmonary hygiene and multimodal pain management and serial chest radiography.

## 2023-05-18 ENCOUNTER — APPOINTMENT (OUTPATIENT)
Dept: RADIOLOGY | Facility: MEDICAL CENTER | Age: 43
DRG: 957 | End: 2023-05-18
Attending: SURGERY
Payer: COMMERCIAL

## 2023-05-18 PROBLEM — E83.39 HYPOPHOSPHATEMIA: Status: ACTIVE | Noted: 2023-05-18

## 2023-05-18 PROBLEM — R93.7 ABNORMAL CT SCAN, CERVICAL SPINE: Status: RESOLVED | Noted: 2023-05-17 | Resolved: 2023-05-18

## 2023-05-18 LAB
ALBUMIN SERPL BCP-MCNC: 2.8 G/DL (ref 3.2–4.9)
ALBUMIN/GLOB SERPL: 1.3 G/DL
ALP SERPL-CCNC: 33 U/L (ref 30–99)
ALT SERPL-CCNC: 32 U/L (ref 2–50)
ANION GAP SERPL CALC-SCNC: 10 MMOL/L (ref 7–16)
AST SERPL-CCNC: 52 U/L (ref 12–45)
BASOPHILS # BLD AUTO: 0.3 % (ref 0–1.8)
BASOPHILS # BLD: 0.03 K/UL (ref 0–0.12)
BILIRUB SERPL-MCNC: 0.9 MG/DL (ref 0.1–1.5)
BUN SERPL-MCNC: 7 MG/DL (ref 8–22)
CALCIUM ALBUM COR SERPL-MCNC: 7.7 MG/DL (ref 8.5–10.5)
CALCIUM SERPL-MCNC: 6.7 MG/DL (ref 8.5–10.5)
CHLORIDE SERPL-SCNC: 103 MMOL/L (ref 96–112)
CO2 SERPL-SCNC: 27 MMOL/L (ref 20–33)
CREAT SERPL-MCNC: 0.84 MG/DL (ref 0.5–1.4)
EOSINOPHIL # BLD AUTO: 0 K/UL (ref 0–0.51)
EOSINOPHIL NFR BLD: 0 % (ref 0–6.9)
ERYTHROCYTE [DISTWIDTH] IN BLOOD BY AUTOMATED COUNT: 50 FL (ref 35.9–50)
GFR SERPLBLD CREATININE-BSD FMLA CKD-EPI: 111 ML/MIN/1.73 M 2
GLOBULIN SER CALC-MCNC: 2.2 G/DL (ref 1.9–3.5)
GLUCOSE BLD STRIP.AUTO-MCNC: 143 MG/DL (ref 65–99)
GLUCOSE SERPL-MCNC: 125 MG/DL (ref 65–99)
HCT VFR BLD AUTO: 30.9 % (ref 42–52)
HGB BLD-MCNC: 10.2 G/DL (ref 14–18)
IMM GRANULOCYTES # BLD AUTO: 0.07 K/UL (ref 0–0.11)
IMM GRANULOCYTES NFR BLD AUTO: 0.7 % (ref 0–0.9)
LYMPHOCYTES # BLD AUTO: 1.73 K/UL (ref 1–4.8)
LYMPHOCYTES NFR BLD: 16.4 % (ref 22–41)
MAGNESIUM SERPL-MCNC: 1.8 MG/DL (ref 1.5–2.5)
MCH RBC QN AUTO: 31.2 PG (ref 27–33)
MCHC RBC AUTO-ENTMCNC: 33 G/DL (ref 33.7–35.3)
MCV RBC AUTO: 94.5 FL (ref 81.4–97.8)
MONOCYTES # BLD AUTO: 1.01 K/UL (ref 0–0.85)
MONOCYTES NFR BLD AUTO: 9.6 % (ref 0–13.4)
NEUTROPHILS # BLD AUTO: 7.71 K/UL (ref 1.82–7.42)
NEUTROPHILS NFR BLD: 73 % (ref 44–72)
NRBC # BLD AUTO: 0 K/UL
NRBC BLD-RTO: 0 /100 WBC
PHOSPHATE SERPL-MCNC: 2.4 MG/DL (ref 2.5–4.5)
PLATELET # BLD AUTO: 128 K/UL (ref 164–446)
PMV BLD AUTO: 10.9 FL (ref 9–12.9)
POTASSIUM SERPL-SCNC: 4 MMOL/L (ref 3.6–5.5)
PROT SERPL-MCNC: 5 G/DL (ref 6–8.2)
RBC # BLD AUTO: 3.27 M/UL (ref 4.7–6.1)
SODIUM SERPL-SCNC: 140 MMOL/L (ref 135–145)
WBC # BLD AUTO: 10.6 K/UL (ref 4.8–10.8)

## 2023-05-18 PROCEDURE — 84100 ASSAY OF PHOSPHORUS: CPT

## 2023-05-18 PROCEDURE — 85025 COMPLETE CBC W/AUTO DIFF WBC: CPT

## 2023-05-18 PROCEDURE — L4398 FOOT DROP SPLINT PRE OTS: HCPCS

## 2023-05-18 PROCEDURE — A9270 NON-COVERED ITEM OR SERVICE: HCPCS | Performed by: SURGERY

## 2023-05-18 PROCEDURE — 770022 HCHG ROOM/CARE - ICU (200)

## 2023-05-18 PROCEDURE — A9270 NON-COVERED ITEM OR SERVICE: HCPCS | Performed by: PHYSICAL MEDICINE & REHABILITATION

## 2023-05-18 PROCEDURE — 700102 HCHG RX REV CODE 250 W/ 637 OVERRIDE(OP): Performed by: PHYSICAL MEDICINE & REHABILITATION

## 2023-05-18 PROCEDURE — 82962 GLUCOSE BLOOD TEST: CPT

## 2023-05-18 PROCEDURE — 97530 THERAPEUTIC ACTIVITIES: CPT

## 2023-05-18 PROCEDURE — 99223 1ST HOSP IP/OBS HIGH 75: CPT | Performed by: PHYSICAL MEDICINE & REHABILITATION

## 2023-05-18 PROCEDURE — 94669 MECHANICAL CHEST WALL OSCILL: CPT

## 2023-05-18 PROCEDURE — 80053 COMPREHEN METABOLIC PANEL: CPT

## 2023-05-18 PROCEDURE — 700101 HCHG RX REV CODE 250: Performed by: SURGERY

## 2023-05-18 PROCEDURE — 93970 EXTREMITY STUDY: CPT

## 2023-05-18 PROCEDURE — 700111 HCHG RX REV CODE 636 W/ 250 OVERRIDE (IP): Performed by: SURGERY

## 2023-05-18 PROCEDURE — 93970 EXTREMITY STUDY: CPT | Mod: 26 | Performed by: INTERNAL MEDICINE

## 2023-05-18 PROCEDURE — 71045 X-RAY EXAM CHEST 1 VIEW: CPT

## 2023-05-18 PROCEDURE — 700105 HCHG RX REV CODE 258: Performed by: SURGERY

## 2023-05-18 PROCEDURE — 99233 SBSQ HOSP IP/OBS HIGH 50: CPT | Performed by: SURGERY

## 2023-05-18 PROCEDURE — 700102 HCHG RX REV CODE 250 W/ 637 OVERRIDE(OP): Performed by: SURGERY

## 2023-05-18 PROCEDURE — 83735 ASSAY OF MAGNESIUM: CPT

## 2023-05-18 RX ORDER — DIAZEPAM 5 MG/1
5 TABLET ORAL EVERY 6 HOURS PRN
Status: DISCONTINUED | OUTPATIENT
Start: 2023-05-18 | End: 2023-05-23 | Stop reason: HOSPADM

## 2023-05-18 RX ORDER — MORPHINE SULFATE 15 MG/1
15 TABLET, FILM COATED, EXTENDED RELEASE ORAL EVERY 12 HOURS
Status: DISCONTINUED | OUTPATIENT
Start: 2023-05-18 | End: 2023-05-23 | Stop reason: HOSPADM

## 2023-05-18 RX ORDER — MIDODRINE HYDROCHLORIDE 5 MG/1
5 TABLET ORAL
Status: DISCONTINUED | OUTPATIENT
Start: 2023-05-18 | End: 2023-05-23 | Stop reason: HOSPADM

## 2023-05-18 RX ORDER — BISACODYL 10 MG
10 SUPPOSITORY, RECTAL RECTAL EVERY MORNING
Status: DISCONTINUED | OUTPATIENT
Start: 2023-05-19 | End: 2023-05-23 | Stop reason: HOSPADM

## 2023-05-18 RX ORDER — MAGNESIUM SULFATE HEPTAHYDRATE 40 MG/ML
2 INJECTION, SOLUTION INTRAVENOUS ONCE
Status: COMPLETED | OUTPATIENT
Start: 2023-05-18 | End: 2023-05-18

## 2023-05-18 RX ADMIN — MIDODRINE HYDROCHLORIDE 5 MG: 5 TABLET ORAL at 18:15

## 2023-05-18 RX ADMIN — HYDROMORPHONE HYDROCHLORIDE 1 MG: 1 INJECTION, SOLUTION INTRAMUSCULAR; INTRAVENOUS; SUBCUTANEOUS at 10:34

## 2023-05-18 RX ADMIN — OXYCODONE HYDROCHLORIDE 10 MG: 10 TABLET ORAL at 09:58

## 2023-05-18 RX ADMIN — ACETAMINOPHEN 1000 MG: 500 TABLET, FILM COATED ORAL at 18:15

## 2023-05-18 RX ADMIN — DIBASIC SODIUM PHOSPHATE, MONOBASIC POTASSIUM PHOSPHATE AND MONOBASIC SODIUM PHOSPHATE 500 MG: 852; 155; 130 TABLET ORAL at 09:59

## 2023-05-18 RX ADMIN — OXYCODONE HYDROCHLORIDE 10 MG: 10 TABLET ORAL at 04:03

## 2023-05-18 RX ADMIN — Medication 1 APPLICATOR: at 05:14

## 2023-05-18 RX ADMIN — SODIUM CHLORIDE, POTASSIUM CHLORIDE, SODIUM LACTATE AND CALCIUM CHLORIDE: 600; 310; 30; 20 INJECTION, SOLUTION INTRAVENOUS at 14:19

## 2023-05-18 RX ADMIN — ACETAMINOPHEN 1000 MG: 500 TABLET, FILM COATED ORAL at 11:12

## 2023-05-18 RX ADMIN — SODIUM CHLORIDE, POTASSIUM CHLORIDE, SODIUM LACTATE AND CALCIUM CHLORIDE: 600; 310; 30; 20 INJECTION, SOLUTION INTRAVENOUS at 05:32

## 2023-05-18 RX ADMIN — FAMOTIDINE 20 MG: 20 TABLET, FILM COATED ORAL at 18:15

## 2023-05-18 RX ADMIN — LIDOCAINE 1 PATCH: 50 PATCH TOPICAL at 05:13

## 2023-05-18 RX ADMIN — DIAZEPAM 5 MG: 5 TABLET ORAL at 18:15

## 2023-05-18 RX ADMIN — SENNOSIDES AND DOCUSATE SODIUM 1 TABLET: 50; 8.6 TABLET ORAL at 20:24

## 2023-05-18 RX ADMIN — DOCUSATE SODIUM 100 MG: 50 LIQUID ORAL at 18:14

## 2023-05-18 RX ADMIN — ACETAMINOPHEN 1000 MG: 500 TABLET, FILM COATED ORAL at 05:14

## 2023-05-18 RX ADMIN — Medication 1 APPLICATOR: at 18:17

## 2023-05-18 RX ADMIN — HYDROMORPHONE HYDROCHLORIDE 1 MG: 1 INJECTION, SOLUTION INTRAMUSCULAR; INTRAVENOUS; SUBCUTANEOUS at 06:48

## 2023-05-18 RX ADMIN — MORPHINE SULFATE 15 MG: 15 TABLET, FILM COATED, EXTENDED RELEASE ORAL at 11:11

## 2023-05-18 RX ADMIN — MAGNESIUM SULFATE HEPTAHYDRATE 2 G: 2 INJECTION, SOLUTION INTRAVENOUS at 10:04

## 2023-05-18 RX ADMIN — OXYCODONE HYDROCHLORIDE 10 MG: 10 TABLET ORAL at 16:27

## 2023-05-18 RX ADMIN — POLYETHYLENE GLYCOL 3350 1 PACKET: 17 POWDER, FOR SOLUTION ORAL at 05:13

## 2023-05-18 RX ADMIN — FAMOTIDINE 20 MG: 20 TABLET, FILM COATED ORAL at 05:14

## 2023-05-18 RX ADMIN — POLYETHYLENE GLYCOL 3350 1 PACKET: 17 POWDER, FOR SOLUTION ORAL at 18:14

## 2023-05-18 RX ADMIN — OXYCODONE HYDROCHLORIDE 10 MG: 10 TABLET ORAL at 20:24

## 2023-05-18 RX ADMIN — DIBASIC SODIUM PHOSPHATE, MONOBASIC POTASSIUM PHOSPHATE AND MONOBASIC SODIUM PHOSPHATE 500 MG: 852; 155; 130 TABLET ORAL at 18:14

## 2023-05-18 RX ADMIN — DOCUSATE SODIUM 100 MG: 50 LIQUID ORAL at 05:14

## 2023-05-18 RX ADMIN — HYDROMORPHONE HYDROCHLORIDE 1 MG: 1 INJECTION, SOLUTION INTRAMUSCULAR; INTRAVENOUS; SUBCUTANEOUS at 07:57

## 2023-05-18 RX ADMIN — HYDROMORPHONE HYDROCHLORIDE 1 MG: 1 INJECTION, SOLUTION INTRAMUSCULAR; INTRAVENOUS; SUBCUTANEOUS at 22:10

## 2023-05-18 RX ADMIN — MORPHINE SULFATE 15 MG: 15 TABLET, FILM COATED, EXTENDED RELEASE ORAL at 18:17

## 2023-05-18 RX ADMIN — OXYCODONE HYDROCHLORIDE 10 MG: 10 TABLET ORAL at 12:58

## 2023-05-18 ASSESSMENT — ENCOUNTER SYMPTOMS
ABDOMINAL DISTENTION: 0
NAUSEA: 0
BACK PAIN: 1
MYALGIAS: 1
VOMITING: 0
ARTHRALGIAS: 1
ABDOMINAL PAIN: 0

## 2023-05-18 ASSESSMENT — COGNITIVE AND FUNCTIONAL STATUS - GENERAL
CLIMB 3 TO 5 STEPS WITH RAILING: TOTAL
MOVING TO AND FROM BED TO CHAIR: UNABLE
TURNING FROM BACK TO SIDE WHILE IN FLAT BAD: UNABLE
MOBILITY SCORE: 7
WALKING IN HOSPITAL ROOM: TOTAL
STANDING UP FROM CHAIR USING ARMS: A LOT
MOVING FROM LYING ON BACK TO SITTING ON SIDE OF FLAT BED: UNABLE
SUGGESTED CMS G CODE MODIFIER MOBILITY: CM

## 2023-05-18 ASSESSMENT — PAIN DESCRIPTION - PAIN TYPE: TYPE: ACUTE PAIN;SURGICAL PAIN

## 2023-05-18 ASSESSMENT — GAIT ASSESSMENTS: GAIT LEVEL OF ASSIST: UNABLE TO PARTICIPATE

## 2023-05-18 ASSESSMENT — FIBROSIS 4 INDEX: FIB4 SCORE: 3.09

## 2023-05-18 NOTE — THERAPY
"Physical Therapy   Daily Treatment     Patient Name: Bharath Diaz  Age:  42 y.o., Sex:  male  Medical Record #: [id number]  Today's Date: 5/18/2023     Precautions  Precautions: Fall Risk;Spinal / Back Precautions ;TLSO (Thoracolumbosacral orthosis)  Comments: TLSO don EOB when OOB > 5 minutes, MAP > 85 until 5/21    Assessment    Pt received in bed and agreeable to PT session. Pt required max A for bed mobility, mod A for multiple STS from EOB, and max A for stand pivot transfer to chair at bedside. Pt was limited by back pain despite pre-medication. RN reports no muscle relaxer on board. Continued to educate pt on spinal cord injury recovery and next steps for rehabilitation. Will follow for acute PT to progress.    Plan    Treatment Plan Status: Continue Current Treatment Plan  Type of Treatment: Bed Mobility, Gait Training, Neuro Re-Education / Balance, Self Care / Home Evaluation, Stair Training, Therapeutic Activities, Therapeutic Exercise  Treatment Frequency: 5 Times per Week  Treatment Duration: Until Therapy Goals Met    DC Equipment Recommendations: Unable to determine at this time  Discharge Recommendations: Recommend post-acute placement for additional physical therapy services prior to discharge home      Subjective    \"I just don't want to fall\"     Objective       05/18/23 1101   Precautions   Precautions Fall Risk;Spinal / Back Precautions ;TLSO (Thoracolumbosacral orthosis)   Comments TLSO don EOB when OOB > 5 minutes, MAP > 85 until 5/21   Pain 0 - 10 Group   Therapist Pain Assessment During Activity;Nurse Notified  (pre-medicated, mid-back pain, not rated)   Cognition    Level of Consciousness Alert   Comments Pleasant and cooperative. Initially hesitant to attempt chair transfer, but agreed with encouragement and reassurance   Strength Lower Body   Comments Continued lack of volitional movement for B DF and PF   Balance   Sitting Balance (Static) Fair -   Sitting Balance (Dynamic) Fair - "   Standing Balance (Static) Trace +   Standing Balance (Dynamic) Trace   Weight Shift Sitting Poor   Weight Shift Standing Poor   Skilled Intervention Verbal Cuing;Tactile Cuing;Facilitation   Comments Facilitation required in standing, pt unable control knee extension well   Bed Mobility    Supine to Sit Maximal Assist   Scooting Maximal Assist   Rolling Minimum Assist to Lt.   Skilled Intervention Verbal Cuing;Tactile Cuing;Sequencing;Facilitation   Comments log roll   Gait Analysis   Gait Level Of Assist Unable to Participate   Functional Mobility   Sit to Stand Moderate Assist   Bed, Chair, Wheelchair Transfer Maximal Assist   Transfer Method Stand Pivot   Mobility EOB, STSx3, pivot to chair   Skilled Intervention Verbal Cuing;Tactile Cuing;Sequencing;Facilitation   Comments Mild knee buckling in standing with pt reporting limited proprioception. Transfer performed with recliner arm rest up and partial stand pivot with 2P for safety.   How much difficulty does the patient currently have...   Turning over in bed (including adjusting bedclothes, sheets and blankets)? 1   Sitting down on and standing up from a chair with arms (e.g., wheelchair, bedside commode, etc.) 1   Moving from lying on back to sitting on the side of the bed? 1   How much help from another person does the patient currently need...   Moving to and from a bed to a chair (including a wheelchair)? 2   Need to walk in a hospital room? 1   Climbing 3-5 steps with a railing? 1   6 clicks Mobility Score 7   Short Term Goals    Short Term Goal # 1 pt will perform supine <> sit with min A in 6 visits for improved independence   Goal Outcome # 1 goal not met   Short Term Goal # 2 pt will roll R/L with min A in 6 visits for improved independence   Goal Outcome # 2 Goal not met   Short Term Goal # 3 pt will perform STS with CGA in 6 visits for improved independence   Goal Outcome # 3 Goal not met   Short Term Goal # 4 pt will complete SPT to chair with min  A in 6 visits for improved independence   Goal Outcome # 4 Goal not met   Short Term Goal # 5 pt will ambulate 50ft with FWW and min A in 6 visits to initiate gait   Goal Outcome # 5 Goal not met   Physical Therapy Treatment Plan   Physical Therapy Treatment Plan Continue Current Treatment Plan   Anticipated Discharge Equipment and Recommendations   DC Equipment Recommendations Unable to determine at this time   Discharge Recommendations Recommend post-acute placement for additional physical therapy services prior to discharge home   Interdisciplinary Plan of Care Collaboration   IDT Collaboration with  Nursing   Patient Position at End of Therapy Seated;Call Light within Reach;Tray Table within Reach;Phone within Reach   Collaboration Comments RN present during session.

## 2023-05-18 NOTE — CARE PLAN
The patient is Stable - Low risk of patient condition declining or worsening    Shift Goals  Clinical Goals: Pulmonary hygiene, mobility, pain control < 7  Patient Goals: Rest  Family Goals: None present      Problem: Knowledge Deficit - Standard  Goal: Patient and family/care givers will demonstrate understanding of plan of care, disease process/condition, diagnostic tests and medications  Outcome: Progressing     Problem: Pain - Standard  Goal: Alleviation of pain or a reduction in pain to the patient’s comfort goal  Outcome: Progressing     Problem: Skin Integrity  Goal: Skin integrity is maintained or improved  Outcome: Progressing     Problem: Fall Risk  Goal: Patient will remain free from falls  Outcome: Progressing

## 2023-05-18 NOTE — PROGRESS NOTES
Neurosurgery Progress Note    Subjective:  Extubated this morning, still with high output of the Hemovac drains.  H&H dropped to 10.2/30.9.  Requiring pressors, maintaining maps.    Exam:    Awake and alert, sitting in a chair.  Wearing TLSO brace    RLE                 LLE  Iliopsoas                         3/5                   3/5  Quadriceps                     4/5                   4/5   Dorsiflexion                   0/5                    0/5  Eversion                        0/5                    0/5   EHL                               0/5                    0/5  Plantarflexion                 0/5                   0/5  Hamstrings                     3/5                  3/5    Posterior thoracic incision dressed with Prevena. Subfascial HVx2.    Recent Labs     05/16/23 1825 05/17/23 0445 05/18/23 0413   WBC 13.9* 19.1* 10.6   RBC 4.54* 4.64* 3.27*   HEMOGLOBIN 14.2 14.2 10.2*   HEMATOCRIT 43.3 43.9 30.9*   MCV 95.4 94.6 94.5   MCH 31.3 30.6 31.2   MCHC 32.8* 32.3* 33.0*   RDW 51.1* 51.7* 50.0   PLATELETCT 187 191 128*   MPV 10.3 10.6 10.9       Recent Labs     05/16/23 1825 05/17/23 0445 05/18/23 0413   SODIUM 143 141 140   POTASSIUM 3.9 5.5 4.0   CHLORIDE 109 106 103   CO2 17* 19* 27   GLUCOSE 182* 172* 125*   BUN 8 8 7*   CREATININE 1.04 1.09 0.84   CALCIUM 8.0* 7.0* 6.7*       Recent Labs     05/16/23 1825   APTT 23.1*   INR 1.04       Recent Labs     05/16/23 1825   REACTMIN 4.2*   CLOTKINET 1.6   CLOTANGL 70.9   MAXCLOTS 56.5   [id number] 0.2   PRCINADP 66.5*   PRCINAA 3.1         Assessment and Plan:  Patient is hospital day # 3, POD# 2, s/p T11-T12 laminectomy, T11-L1 posterior instrumented fusion, ROBERTO C T12 level spinal cord injury.    - Continue ICU care, q4h neuro checks.  - MAP > 85mmHg x5d (stop 5/21)  - Continue drain to self-suction. x2  - C-collar cleared  - Remove Prevena when battery runs out  -Given the high output from the subfascial drains, would hold off on chemical DVT  prophylaxis at this time    Please call with questions.    Vita Barger M.D.
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
## 2023-05-18 NOTE — CARE PLAN
The patient is Watcher - Medium risk of patient condition declining or worsening    Shift Goals  Clinical Goals: extubate, hemodynamic stability  Patient Goals: MIGUEL  Family Goals: MIGUEL    Progress made toward(s) clinical / shift goals:      Problem: Pain - Standard  Goal: Alleviation of pain or a reduction in pain to the patient’s comfort goal  Outcome: Progressing     Problem: Skin Integrity  Goal: Skin integrity is maintained or improved  Outcome: Progressing     Patient is not progressing towards the following goals:

## 2023-05-18 NOTE — CARE PLAN
The patient is Watcher - Medium risk of patient condition declining or worsening    Shift Goals  Clinical Goals: Pulmonary hygiene, MAP >85  Patient Goals: Rest  Family Goals: None present    Progress made toward(s) clinical / shift goals:  MAP goals met with no use of Levophed or other BP support. Education about pulmonary hygiene: oral care, deep breathing, coughing, and IS.    Problem: Knowledge Deficit - Standard  Goal: Patient and family/care givers will demonstrate understanding of plan of care, disease process/condition, diagnostic tests and medications  Outcome: Progressing     Problem: Pain - Standard  Goal: Alleviation of pain or a reduction in pain to the patient’s comfort goal  Outcome: Progressing     Problem: Skin Integrity  Goal: Skin integrity is maintained or improved  Outcome: Progressing       Patient is not progressing towards the following goals:

## 2023-05-18 NOTE — CARE PLAN
Problem: Hyperinflation  Goal: Prevent or improve atelectasis  Description: Target End Date:  3 to 4 days    1. Instruct incentive spirometry usage  2.  Perform hyperinflation therapy as indicated  Outcome: Progressing  Flowsheets  Hyperinflation Protocol Goals/Outcome:   Stable Vital Capacity x24 hrs and Patient Understands / uses I.S.   Greater Than 60% of Predicted I.S. Volume x 24 hrs  Hyperinflation Protocol Indications: Chest Trauma (Blunt, Penetrative, Crushing, or Surgical)       Respiratory Update    Treatment modality: PEP  Frequency: QID    IS: 3250    Pt tolerating current treatments well with no adverse reactions.
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
## 2023-05-18 NOTE — DISCHARGE PLANNING
Care Transition Team Assessment    LSW met with pt at bedside to complete assessment. Pt verified the information on the face sheet. Pt lives in a single story house that has 3 steps to enter. Pt lives with his wife, Tanisha, however reports they are in the middle of a separation. Pt has good DC support from his mom and sister who are currently visiting while he's in the hospital. Pt is unsure how long they will be here for. Prior to this hospitalization pt was independent with all ADLs and IADLs. Pt drives independently and does not use any DME at baseline. Pt is employed as an , no financial concerns. Pt has a history of etoh, however denies wanting any resources at this time.    LSW spoke with pt about PT/OT recommendations. Pt is agreeable with acute rehab and [facility] Choice form faxed to [facility].    Information Source  Orientation Level: Oriented X4  Information Given By: Patient  Informant's Name: Bharath Diaz  Who is responsible for making decisions for patient? : Patient    Readmission Evaluation  Is this a readmission?: No    Elopement Risk  Legal Hold: No  Ambulatory or Self Mobile in Wheelchair: No-Not an Elopement Risk  Elopement Risk: Not at Risk for Elopement    Interdisciplinary Discharge Planning  Lives with - Patient's Self Care Capacity: Spouse  Patient or legal guardian wants to designate a caregiver: No  Support Systems: Friends / Neighbors, Spouse / Significant Other (currently living with wife but they are )  Housing / Facility: [address] [facility]  Prior Services: None  Durable Medical Equipment: Not Applicable    Discharge Preparedness  What is your plan after discharge?: Other (comment) (acute rehab)  What are your discharge supports?: Sibling, Parent  Prior Functional Level: Ambulatory, Drives Self, Independent with Activities of Daily Living, Independent with Medication Management  Difficulity with ADLs: None  Difficulity with  IADLs: None    Functional Assesment  Prior Functional Level: Ambulatory, Drives Self, Independent with Activities of Daily Living, Independent with Medication Management    Finances  Financial Barriers to Discharge: No  Prescription Coverage: Yes    Vision / Hearing Impairment  Vision Impairment : No  Hearing Impairment : No    Advance Directive  Advance Directive?: None  Advance Directive offered?: AD Booklet refused    Domestic Abuse  Have you ever been the victim of abuse or violence?: No  Physical Abuse or Sexual Abuse: No  Verbal Abuse or Emotional Abuse: No  Possible Abuse/Neglect Reported to:: Not Applicable    Psychological Assessment  History of Substance Abuse: Alcohol  History of Psychiatric Problems: No  Non-compliant with Treatment: No  Newly Diagnosed Illness: No    Discharge Risks or Barriers  Discharge risks or barriers?: No PCP, Substance abuse  Patient risk factors: No PCP, Substance abuse    Anticipated Discharge Information  Discharge Disposition: Disch to IP rehab facility or distinct part unit (62)

## 2023-05-19 ENCOUNTER — APPOINTMENT (OUTPATIENT)
Dept: RADIOLOGY | Facility: MEDICAL CENTER | Age: 43
DRG: 957 | End: 2023-05-19
Attending: SURGERY
Payer: COMMERCIAL

## 2023-05-19 ENCOUNTER — APPOINTMENT (OUTPATIENT)
Dept: RADIOLOGY | Facility: MEDICAL CENTER | Age: 43
DRG: 957 | End: 2023-05-19
Payer: COMMERCIAL

## 2023-05-19 PROBLEM — Z78.9 NO CONTRAINDICATION TO DEEP VEIN THROMBOSIS (DVT) PROPHYLAXIS: Status: ACTIVE | Noted: 2023-05-16

## 2023-05-19 PROBLEM — K59.2 NEUROGENIC BOWEL: Status: ACTIVE | Noted: 2023-05-19

## 2023-05-19 PROBLEM — N31.9 NEUROGENIC BLADDER: Status: ACTIVE | Noted: 2023-05-19

## 2023-05-19 PROBLEM — E83.51 HYPOCALCEMIA: Status: RESOLVED | Noted: 2023-05-17 | Resolved: 2023-05-19

## 2023-05-19 LAB
ALBUMIN SERPL BCP-MCNC: 2.8 G/DL (ref 3.2–4.9)
ALBUMIN/GLOB SERPL: 1.2 G/DL
ALP SERPL-CCNC: 35 U/L (ref 30–99)
ALT SERPL-CCNC: 26 U/L (ref 2–50)
ANION GAP SERPL CALC-SCNC: 7 MMOL/L (ref 7–16)
AST SERPL-CCNC: 44 U/L (ref 12–45)
BASOPHILS # BLD AUTO: 0.3 % (ref 0–1.8)
BASOPHILS # BLD: 0.02 K/UL (ref 0–0.12)
BILIRUB SERPL-MCNC: 0.5 MG/DL (ref 0.1–1.5)
BUN SERPL-MCNC: 5 MG/DL (ref 8–22)
CALCIUM ALBUM COR SERPL-MCNC: 8 MG/DL (ref 8.5–10.5)
CALCIUM SERPL-MCNC: 7 MG/DL (ref 8.5–10.5)
CHLORIDE SERPL-SCNC: 100 MMOL/L (ref 96–112)
CO2 SERPL-SCNC: 31 MMOL/L (ref 20–33)
CREAT SERPL-MCNC: 0.71 MG/DL (ref 0.5–1.4)
EOSINOPHIL # BLD AUTO: 0.02 K/UL (ref 0–0.51)
EOSINOPHIL NFR BLD: 0.3 % (ref 0–6.9)
ERYTHROCYTE [DISTWIDTH] IN BLOOD BY AUTOMATED COUNT: 49.5 FL (ref 35.9–50)
GFR SERPLBLD CREATININE-BSD FMLA CKD-EPI: 116 ML/MIN/1.73 M 2
GLOBULIN SER CALC-MCNC: 2.4 G/DL (ref 1.9–3.5)
GLUCOSE BLD STRIP.AUTO-MCNC: 122 MG/DL (ref 65–99)
GLUCOSE SERPL-MCNC: 134 MG/DL (ref 65–99)
HCT VFR BLD AUTO: 27.9 % (ref 42–52)
HGB BLD-MCNC: 9 G/DL (ref 14–18)
HGB BLD-MCNC: 9 G/DL (ref 14–18)
IMM GRANULOCYTES # BLD AUTO: 0.07 K/UL (ref 0–0.11)
IMM GRANULOCYTES NFR BLD AUTO: 0.9 % (ref 0–0.9)
LYMPHOCYTES # BLD AUTO: 1.32 K/UL (ref 1–4.8)
LYMPHOCYTES NFR BLD: 17.7 % (ref 22–41)
MCH RBC QN AUTO: 30.6 PG (ref 27–33)
MCHC RBC AUTO-ENTMCNC: 32.3 G/DL (ref 33.7–35.3)
MCV RBC AUTO: 94.9 FL (ref 81.4–97.8)
MONOCYTES # BLD AUTO: 0.62 K/UL (ref 0–0.85)
MONOCYTES NFR BLD AUTO: 8.3 % (ref 0–13.4)
NEUTROPHILS # BLD AUTO: 5.39 K/UL (ref 1.82–7.42)
NEUTROPHILS NFR BLD: 72.5 % (ref 44–72)
NRBC # BLD AUTO: 0 K/UL
NRBC BLD-RTO: 0 /100 WBC
PHOSPHATE SERPL-MCNC: 1.7 MG/DL (ref 2.5–4.5)
PLATELET # BLD AUTO: 119 K/UL (ref 164–446)
PMV BLD AUTO: 11 FL (ref 9–12.9)
POTASSIUM SERPL-SCNC: 3.6 MMOL/L (ref 3.6–5.5)
PROT SERPL-MCNC: 5.2 G/DL (ref 6–8.2)
RBC # BLD AUTO: 2.94 M/UL (ref 4.7–6.1)
SODIUM SERPL-SCNC: 138 MMOL/L (ref 135–145)
WBC # BLD AUTO: 7.4 K/UL (ref 4.8–10.8)

## 2023-05-19 PROCEDURE — 700102 HCHG RX REV CODE 250 W/ 637 OVERRIDE(OP): Performed by: PHYSICAL MEDICINE & REHABILITATION

## 2023-05-19 PROCEDURE — 700102 HCHG RX REV CODE 250 W/ 637 OVERRIDE(OP): Performed by: SURGERY

## 2023-05-19 PROCEDURE — 84100 ASSAY OF PHOSPHORUS: CPT

## 2023-05-19 PROCEDURE — 94669 MECHANICAL CHEST WALL OSCILL: CPT

## 2023-05-19 PROCEDURE — A9270 NON-COVERED ITEM OR SERVICE: HCPCS | Performed by: SURGERY

## 2023-05-19 PROCEDURE — 700102 HCHG RX REV CODE 250 W/ 637 OVERRIDE(OP)

## 2023-05-19 PROCEDURE — 80053 COMPREHEN METABOLIC PANEL: CPT

## 2023-05-19 PROCEDURE — 73030 X-RAY EXAM OF SHOULDER: CPT | Mod: RT

## 2023-05-19 PROCEDURE — 700101 HCHG RX REV CODE 250: Performed by: SURGERY

## 2023-05-19 PROCEDURE — 85025 COMPLETE CBC W/AUTO DIFF WBC: CPT

## 2023-05-19 PROCEDURE — 85018 HEMOGLOBIN: CPT

## 2023-05-19 PROCEDURE — 36415 COLL VENOUS BLD VENIPUNCTURE: CPT

## 2023-05-19 PROCEDURE — 94760 N-INVAS EAR/PLS OXIMETRY 1: CPT

## 2023-05-19 PROCEDURE — A9270 NON-COVERED ITEM OR SERVICE: HCPCS

## 2023-05-19 PROCEDURE — 82962 GLUCOSE BLOOD TEST: CPT

## 2023-05-19 PROCEDURE — A9270 NON-COVERED ITEM OR SERVICE: HCPCS | Performed by: PHYSICAL MEDICINE & REHABILITATION

## 2023-05-19 PROCEDURE — 99233 SBSQ HOSP IP/OBS HIGH 50: CPT

## 2023-05-19 PROCEDURE — 71045 X-RAY EXAM CHEST 1 VIEW: CPT

## 2023-05-19 PROCEDURE — 770006 HCHG ROOM/CARE - MED/SURG/GYN SEMI*

## 2023-05-19 PROCEDURE — 700111 HCHG RX REV CODE 636 W/ 250 OVERRIDE (IP)

## 2023-05-19 RX ORDER — OXYCODONE HYDROCHLORIDE 10 MG/1
10 TABLET ORAL
Status: DISCONTINUED | OUTPATIENT
Start: 2023-05-19 | End: 2023-05-21

## 2023-05-19 RX ORDER — ENOXAPARIN SODIUM 100 MG/ML
30 INJECTION SUBCUTANEOUS EVERY 12 HOURS
Status: DISCONTINUED | OUTPATIENT
Start: 2023-05-19 | End: 2023-05-23 | Stop reason: HOSPADM

## 2023-05-19 RX ORDER — HYDROMORPHONE HYDROCHLORIDE 1 MG/ML
0.5 INJECTION, SOLUTION INTRAMUSCULAR; INTRAVENOUS; SUBCUTANEOUS
Status: DISCONTINUED | OUTPATIENT
Start: 2023-05-19 | End: 2023-05-21

## 2023-05-19 RX ORDER — OXYCODONE HYDROCHLORIDE 5 MG/1
5 TABLET ORAL
Status: DISCONTINUED | OUTPATIENT
Start: 2023-05-19 | End: 2023-05-21

## 2023-05-19 RX ADMIN — MIDODRINE HYDROCHLORIDE 5 MG: 5 TABLET ORAL at 17:02

## 2023-05-19 RX ADMIN — ENOXAPARIN SODIUM 30 MG: 100 INJECTION SUBCUTANEOUS at 17:05

## 2023-05-19 RX ADMIN — POLYETHYLENE GLYCOL 3350 1 PACKET: 17 POWDER, FOR SOLUTION ORAL at 05:19

## 2023-05-19 RX ADMIN — ACETAMINOPHEN 1000 MG: 500 TABLET, FILM COATED ORAL at 11:43

## 2023-05-19 RX ADMIN — DIAZEPAM 5 MG: 5 TABLET ORAL at 17:02

## 2023-05-19 RX ADMIN — BISACODYL 10 MG: 10 SUPPOSITORY RECTAL at 05:19

## 2023-05-19 RX ADMIN — MORPHINE SULFATE 15 MG: 15 TABLET, FILM COATED, EXTENDED RELEASE ORAL at 05:19

## 2023-05-19 RX ADMIN — DIAZEPAM 5 MG: 5 TABLET ORAL at 08:10

## 2023-05-19 RX ADMIN — ACETAMINOPHEN 1000 MG: 500 TABLET, FILM COATED ORAL at 05:18

## 2023-05-19 RX ADMIN — OXYCODONE HYDROCHLORIDE 10 MG: 10 TABLET ORAL at 11:43

## 2023-05-19 RX ADMIN — MORPHINE SULFATE 15 MG: 15 TABLET, FILM COATED, EXTENDED RELEASE ORAL at 17:02

## 2023-05-19 RX ADMIN — MIDODRINE HYDROCHLORIDE 5 MG: 5 TABLET ORAL at 11:43

## 2023-05-19 RX ADMIN — OXYCODONE HYDROCHLORIDE 10 MG: 10 TABLET ORAL at 02:23

## 2023-05-19 RX ADMIN — Medication 1 APPLICATOR: at 17:09

## 2023-05-19 RX ADMIN — OXYCODONE HYDROCHLORIDE 5 MG: 5 TABLET ORAL at 21:55

## 2023-05-19 RX ADMIN — ACETAMINOPHEN 1000 MG: 500 TABLET, FILM COATED ORAL at 17:02

## 2023-05-19 RX ADMIN — LIDOCAINE 1 PATCH: 50 PATCH TOPICAL at 05:19

## 2023-05-19 RX ADMIN — DIBASIC SODIUM PHOSPHATE, MONOBASIC POTASSIUM PHOSPHATE AND MONOBASIC SODIUM PHOSPHATE 500 MG: 852; 155; 130 TABLET ORAL at 18:33

## 2023-05-19 RX ADMIN — OXYCODONE HYDROCHLORIDE 10 MG: 10 TABLET ORAL at 18:33

## 2023-05-19 RX ADMIN — ACETAMINOPHEN 1000 MG: 500 TABLET, FILM COATED ORAL at 00:17

## 2023-05-19 RX ADMIN — SENNOSIDES AND DOCUSATE SODIUM 1 TABLET: 50; 8.6 TABLET ORAL at 21:53

## 2023-05-19 RX ADMIN — FAMOTIDINE 20 MG: 20 TABLET, FILM COATED ORAL at 05:19

## 2023-05-19 RX ADMIN — DIAZEPAM 5 MG: 5 TABLET ORAL at 00:17

## 2023-05-19 RX ADMIN — Medication 1 APPLICATOR: at 05:19

## 2023-05-19 RX ADMIN — DOCUSATE SODIUM 100 MG: 50 LIQUID ORAL at 05:18

## 2023-05-19 RX ADMIN — OXYCODONE HYDROCHLORIDE 10 MG: 10 TABLET ORAL at 08:10

## 2023-05-19 RX ADMIN — MIDODRINE HYDROCHLORIDE 5 MG: 5 TABLET ORAL at 08:11

## 2023-05-19 ASSESSMENT — PAIN DESCRIPTION - PAIN TYPE
TYPE: ACUTE PAIN;SURGICAL PAIN
TYPE: ACUTE PAIN
TYPE: ACUTE PAIN;SURGICAL PAIN
TYPE: ACUTE PAIN
TYPE: ACUTE PAIN
TYPE: ACUTE PAIN;SURGICAL PAIN
TYPE: ACUTE PAIN

## 2023-05-19 ASSESSMENT — ENCOUNTER SYMPTOMS
SPEECH CHANGE: 0
DIAPHORESIS: 0
PALPITATIONS: 0
FEVER: 0
SENSORY CHANGE: 1
NECK PAIN: 0
TINGLING: 1
EYE PAIN: 0
ABDOMINAL PAIN: 0
HEADACHES: 0
PHOTOPHOBIA: 0
MYALGIAS: 1
WEAKNESS: 1
DIZZINESS: 0
VOMITING: 0
CHILLS: 0
BACK PAIN: 1
COUGH: 0
SHORTNESS OF BREATH: 0
DOUBLE VISION: 0
BLURRED VISION: 0
NAUSEA: 0

## 2023-05-19 ASSESSMENT — FIBROSIS 4 INDEX: FIB4 SCORE: 3.12
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
## 2023-05-19 NOTE — CARE PLAN
Problem: Hyperinflation  Goal: Prevent or improve atelectasis  Description: Target End Date:  3 to 4 days    1. Instruct incentive spirometry usage  2.  Perform hyperinflation therapy as indicated  Outcome: Progressing       PEP QID  IS - 325

## 2023-05-19 NOTE — CARE PLAN
The patient is Stable - Low risk of patient condition declining or worsening    Shift Goals  Clinical Goals: Pain control, rest  Patient Goals: Sleep  Family Goals: Healing, updates    Progress made toward(s) clinical / shift goals:    Problem: Knowledge Deficit - Standard  Goal: Patient and family/care givers will demonstrate understanding of plan of care, disease process/condition, diagnostic tests and medications  Outcome: Progressing     Problem: Pain - Standard  Goal: Alleviation of pain or a reduction in pain to the patient’s comfort goal  Outcome: Progressing     Problem: Skin Integrity  Goal: Skin integrity is maintained or improved  Outcome: Progressing     Problem: Fall Risk  Goal: Patient will remain free from falls  Outcome: Progressing       Patient is not progressing towards the following goals:  Patient needs to increase mobility.  Pain control has been a challenge but has begun to improve.

## 2023-05-19 NOTE — DISCHARGE PLANNING
Per physiatry patient is a candidate for IPR. Contacted sister Sary [phone] to verify dc support. Patient lives with wife in a single floor farm home with 2-3 CARLYN. Patient lives with wife, but they are currently going through a separation. Per sister, if wife is unable to provide care upon dc, sister and family in [place] can provide 24/7 care. Sister is a nurse for a rehab facility and lives in a w/c accessible home with daughters who also work in healthcare.     Submitted to [place] for prior authorization, [facility] will follow.

## 2023-05-19 NOTE — PREADMISSION SCREENING NOTE
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 closed, initial encounter ([facility]) 05/17/2023    Closed fracture of spinous process of thoracic vertebra ([facility]) 05/17/2023    Hypocalcemia 05/17/2023    Discharge planning issues 05/17/2023    Trauma 05/16/2023    No contraindication to deep vein thrombosis (DVT) prophylaxis 05/16/2023    Spinal cord injury at T7-T12 level ([facility]) 05/16/2023    Bilateral pulmonary contusion 05/16/2023    Acute  respiratory failure following trauma and surgery ([facility]) 05/16/2023    Acute alcohol intoxication ([facility]) 05/16/2023     Alert  Current Vital Signs:   Temperature:  (In OR) Pulse: 95 Respiration: 13 Blood Pressure: 132/74  Weight: 119 kg (262 lb 2 oz) Height: 182.9 cm (6')  Pulse Oximetry: 96 % O2 (LPM): 1      Completed Laboratory Reports:  Recent Labs     05/16/23  1825 05/17/23  0445 05/18/23  0413 05/19/23  0416 05/19/23  0941   WBC 13.9* 19.1* 10.6 7.4  --    HEMOGLOBIN 14.2 14.2 10.2* 9.0* 9.0*   HEMATOCRIT 43.3 43.9 30.9* 27.9*  --    PLATELETCT 187 191 128* 119*  --    SODIUM 143 141 140 138  --    POTASSIUM 3.9 5.5 4.0 3.6  --    BUN 8 8 7* 5*  --    CREATININE 1.04 1.09 0.84 0.71  --    ALBUMIN 4.1 3.5 2.8* 2.8*  --    GLUCOSE 182* 172* 125* 134*  --    INR 1.04  --   --   --   --      Additional Labs: Not Applicable    Prior Living Situation:   Housing / Facility: 1 Story House  Steps Into Home: 3  Steps In Home: 0  Lives with - Patient's Self Care Capacity: Spouse  Equipment Owned: None    Prior Level of Function / Living Situation:   Physical Therapy: Prior Services: None  Housing / Facility: 1 Story House  Steps Into Home: 3  Steps In Home: 0  Bathroom Set up: Bathtub / Shower Combination  Equipment Owned: None  Lives with - Patient's Self Care Capacity: Spouse  Bed Mobility: Independent  Transfer Status: Independent  Ambulation: Independent  Assistive Devices Used: None  Stairs: Independent  Current Level of Function:   Gait Level Of Assist: Unable to Participate  Weight Bearing Status: no restrictions  Supine to Sit: Maximal Assist  Sit to Supine: Maximal Assist  Scooting: Maximal Assist  Rolling: Minimum Assist to Lt.  Skilled Intervention: Verbal Cuing, Tactile Cuing, Sequencing, Facilitation  Comments: log roll  Sit to Stand: Moderate Assist  Bed, Chair, Wheelchair Transfer: Maximal Assist  Transfer Method: Stand Pivot  Skilled Intervention: Verbal Cuing, Tactile Cuing, Sequencing, Facilitation      Occupational Therapy:   Self Feeding: Independent  Grooming / Hygiene: Independent  Bathing: Independent  Dressing: Independent  Toileting: Independent  Medication Management: Independent  Laundry: Independent  Kitchen Mobility: Independent  Finances: Independent  Home Management: Independent  Shopping: Independent  Prior Level Of Mobility: Independent Without Device in Community, Independent Without Device in Home  Driving / Transportation: Driving Independent  Prior Services: None  Housing / Facility: [address]  Occupation (Pre-Hospital Vocational): Employed Full Time ()  Current Level of Function:   Upper Body Dressing: Maximal Assist (don/doff TLSO)  Lower Body Dressing: Maximal Assist (don socks)  Toileting:  (declined need)  Speech Language Pathology:      Rehabilitation Prognosis/Potential: Good  Estimated Length of Stay: 10-14 days    Nursing:      Ramon in Place    Scope/Intensity of Services Recommended:  Physical Therapy: 1.5 hr / day  5 days / week. Therapeutic Interventions Required: Maximize Endurance, Mobility, Strength, and Safety  Occupational Therapy: 1.5 hr / day 5 days / week. Therapeutic Interventions Required: Maximize Self Care, ADLs, IADLs, and Energy Conservation  Rehabilitation Nursin/7. Therapeutic Interventions Required: Monitor Pain, Skin, Vital Signs, Intake and Output, Labs, Safety, and Family Training  Rehabilitation Physician: 3 - 5 days / week. Therapeutic Interventions Required: Medical Management    He requires 24-hour rehabilitation nursing to manage bowel and bladder function, skin care, surgical incision, nutrition and fluid intake, pain control, safety, medication management, and patient/family goals. In addition, rehabilitation nursing will reiterate and reinforce therapy skills and equipment use, including ADLs, as well as provide education to the patient and family. Bharath Joe is willing to participate in and is able to tolerate the proposed plan  of care.    Rehabilitation Goals and Plan (Expected frequency & duration of treatment in the IRF):   Return to the Community, Minimal Assist Level of Care, Moderate Assist Level of Care, and Family Able to Provide 24/7 Assistance  Anticipated Date of Rehabilitation Admission: 5/22/23  Patient/Family oriented IRF level of care/facility/plan: Yes  Patient/Family willing to participate in IRF care/facility/plan: Yes  Patient able to tolerate IRF level of care proposed: Yes  Patient has potential to benefit IRF level of care proposed: Yes  Comments: Not Applicable    Special Needs or Precautions - Medical Necessity:  Safety Concerns/Precautions:  Fall Risk / High Risk for Falls and Balance  Pain Management  IV Site: Peripheral  Current Medications:    Current Facility-Administered Medications Ordered in Epic   Medication Dose Route Frequency Provider Last Rate Last Admin    enoxaparin (Lovenox) inj 30 mg  30 mg Subcutaneous Q12HRS AB Carcamo nasal  swab  1 Applicator Each Nostril BID Max Root D.O.   1 Applicator at 05/19/23 0519    morphine ER (MS CONTIN) tablet 15 mg  15 mg Oral Q12HRS Lul Jiménez M.D.   15 mg at 05/19/23 0519    diazePAM (VALIUM) tablet 5 mg  5 mg Oral Q6HRS PRN Lul Jiménez M.D.   5 mg at 05/19/23 0810    bisacodyl (DULCOLAX) suppository 10 mg  10 mg Rectal QAM Liam Worchel, D.O.   10 mg at 05/19/23 0519    midodrine (PROAMATINE) tablet 5 mg  5 mg Oral TID WITH MEALS Liam Worchel, D.O.   5 mg at 05/19/23 1143    oxyCODONE immediate-release (ROXICODONE) tablet 5 mg  5 mg Enteral Tube Q3HRS PRN Lul Jiménez M.D.        Or    oxyCODONE immediate release (ROXICODONE) tablet 10 mg  10 mg Enteral Tube Q3HRS PRN Lul Jiménez M.D.   10 mg at 05/19/23 1143    Or    HYDROmorphone (Dilaudid) injection 1 mg  1 mg Intravenous Q HOUR PRN Lul Jiménez M.D.   1 mg at 05/18/23 2210    Pharmacy Consult Request ...Pain Management Review 1 Each  1 Each Other  PHARMACY TO DOSE Fady Traylor M.D.        ondansetron (ZOFRAN) syringe/vial injection 4 mg  4 mg Intravenous Q4HRS PRN Fady Traylor M.D.        bisacodyl (DULCOLAX) suppository 10 mg  10 mg Rectal Q24HRS PRN Fady Traylor M.D.        sodium phosphate (Fleet) enema 133 mL  1 Each Rectal Once PRN Fady Traylor M.D.        Respiratory Therapy Consult   Nebulization Continuous RT Fady Traylor M.D.        famotidine (PEPCID) tablet 20 mg  20 mg Enteral Tube BID Fady Traylor M.D.   20 mg at 05/19/23 0519    Or    famotidine (PEPCID) injection 20 mg  20 mg Intravenous BID Fady Traylor M.D.   20 mg at 05/17/23 0501    LR infusion   Intravenous Continuous Lul Jiménez M.D. 10 mL/hr at 05/18/23 2000 Rate Verify at 05/18/23 2000    acetaminophen (TYLENOL) tablet 1,000 mg  1,000 mg Enteral Tube Q6HRS Fady Traylor M.D.   1,000 mg at 05/19/23 1143    Followed by    [START ON 5/22/2023] acetaminophen (TYLENOL) tablet 1,000 mg  1,000 mg Enteral Tube Q6HRS PRN Fady Traylor M.D.        lidocaine (LIDODERM) 5 % 1 Patch  1 Patch Transdermal Q24HRS Fady Traylor M.D.   1 Patch at 05/19/23 0519    labetalol (NORMODYNE/TRANDATE) injection 10 mg  10 mg Intravenous Q4HRS PRN Fady Traylor M.D.        insulin regular (HumuLIN R,NovoLIN R) injection  1-6 Units Subcutaneous Q6HRS Fady Traylor M.D.   1 Units at 05/17/23 0458    And    dextrose 10 % BOLUS 25 g  25 g Intravenous Q15 MIN PRN Fady Traylor M.D.        docusate sodium (Colace) oral solution 100 mg  100 mg Enteral Tube BID Fady Traylor M.D.   100 mg at 05/19/23 0518    magnesium hydroxide (MILK OF MAGNESIA) suspension 30 mL  30 mL Enteral Tube DAILY Fady Traylor M.D.        ondansetron (ZOFRAN ODT) dispertab 4 mg  4 mg Enteral Tube Q4HRS PRN Fady Traylor M.D.        polyethylene glycol/lytes (MIRALAX) PACKET 1 Packet  1 Packet Enteral Tube BID Fady Traylor M.D.   1 Packet at 05/19/23 0519    senna-docusate (PERICOLACE or SENOKOT S) 8.6-50 MG per tablet  1 Tablet  1 Tablet Enteral Tube Q24HRS PRN Fady Traylor M.D.        senna-docusate (PERICOLACE or SENOKOT S) 8.6-50 MG per tablet 1 Tablet  1 Tablet Enteral Tube Nightly Fady Traylor M.D.   1 Tablet at 05/18/23 2024     No current Epic-ordered outpatient medications on file.     Diet:   DIET ORDERS (From admission to next 24h)       Start     Ordered    05/18/23 1425  Diet Order Diet: Regular  ALL MEALS        Question:  Diet:  Answer:  Regular    05/18/23 1424                    Anticipated Discharge Destination / Patient/Family Goal:  Destination: Home with Assistance Support System: Spouse and Family   Anticipated home health services: OT, PT, Nursing, Social Work, and Aide  Previously used HH service/ provider: Not Applicable  Anticipated DME Needs: Walker and Wheelchair  Outpatient Services: OT and PT  Alternative resources to address additional identified needs:   No future appointments.   Pre-Screen Completed: 5/19/2023 11:48 AM Alexandra Chan

## 2023-05-19 NOTE — PROGRESS NOTES
Neurosurgery Progress Note    Subjective:  Doing well. Still with decent back pain.  Some increased tingling and sensation in feet.    Exam:    Awake and alert, sitting in bed.    RLE                 LLE  Iliopsoas                         4/5                   3/5  Quadriceps                     4+/5                  4/5   Dorsiflexion                   0/5                    0/5  Eversion                        0/5                    0/5   EHL                               0/5                    0/5  Plantarflexion                 0/5                   0/5  Hamstrings                     4/5                  4/5    Posterior thoracic incision dressed with Prevena. Subfascial Hvx2, output decreasing    Recent Labs     05/17/23  0445 05/18/23  0413 05/19/23  0416   WBC 19.1* 10.6 7.4   RBC 4.64* 3.27* 2.94*   HEMOGLOBIN 14.2 10.2* 9.0*   HEMATOCRIT 43.9 30.9* 27.9*   MCV 94.6 94.5 94.9   MCH 30.6 31.2 30.6   MCHC 32.3* 33.0* 32.3*   RDW 51.7* 50.0 49.5   PLATELETCT 191 128* 119*   MPV 10.6 10.9 11.0       Recent Labs     05/17/23 0445 05/18/23  0413 05/19/23  0416   SODIUM 141 140 138   POTASSIUM 5.5 4.0 3.6   CHLORIDE 106 103 100   CO2 19* 27 31   GLUCOSE 172* 125* 134*   BUN 8 7* 5*   CREATININE 1.09 0.84 0.71   CALCIUM 7.0* 6.7* 7.0*       Recent Labs     05/16/23  1825   APTT 23.1*   INR 1.04       Recent Labs     05/16/23  1825   REACTMIN 4.2*   CLOTKINET 1.6   CLOTANGL 70.9   MAXCLOTS 56.5   [id number] 0.2   PRCINADP 66.5*   PRCINAA 3.1         Assessment and Plan:  Patient is hospital day # 4, POD# 3, s/p T11-T12 laminectomy, T11-L1 posterior instrumented fusion, ROBERTO C T12 level spinal cord injury.    - Continue ICU care, q4h neuro checks.  - MAP > 85mmHg x5d (stop 5/21);  OK to move to floor today  - Continue drain to self-suction. X2, output decreasing  - C-collar cleared  - Remove Prevena when battery runs out  -Given the high output from the subfascial drains, would hold off on chemical DVT prophylaxis at  this time    Please call with questions.    Vita Barger M.D.

## 2023-05-19 NOTE — PROGRESS NOTES
Dr. Barger at bedside and stated patient OK to move out of ICU from her stand point since he his maintaining a MAP > 85 with midodrine.

## 2023-05-19 NOTE — CARE PLAN
The patient is Watcher - Medium risk of patient condition declining or worsening    Shift Goals  Clinical Goals: Pain control, rest  Patient Goals: Sleep  Family Goals: Healing, updates    Progress made toward(s) clinical / shift goals:  Pain managed with prn medications, opioids and valium for muscle relaxation in addition to non-pharm interventions such as heat packs, repositioning, etc.    Problem: Knowledge Deficit - Standard  Goal: Patient and family/care givers will demonstrate understanding of plan of care, disease process/condition, diagnostic tests and medications  Outcome: Progressing     Problem: Pain - Standard  Goal: Alleviation of pain or a reduction in pain to the patient’s comfort goal  Outcome: Progressing       Patient is not progressing towards the following goals:
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
## 2023-05-20 ENCOUNTER — APPOINTMENT (OUTPATIENT)
Dept: RADIOLOGY | Facility: MEDICAL CENTER | Age: 43
DRG: 957 | End: 2023-05-20
Attending: SURGERY
Payer: COMMERCIAL

## 2023-05-20 LAB
ANION GAP SERPL CALC-SCNC: 6 MMOL/L (ref 7–16)
BASOPHILS # BLD AUTO: 0.4 % (ref 0–1.8)
BASOPHILS # BLD: 0.03 K/UL (ref 0–0.12)
BUN SERPL-MCNC: 4 MG/DL (ref 8–22)
CALCIUM SERPL-MCNC: 7.5 MG/DL (ref 8.5–10.5)
CHLORIDE SERPL-SCNC: 102 MMOL/L (ref 96–112)
CO2 SERPL-SCNC: 31 MMOL/L (ref 20–33)
CREAT SERPL-MCNC: 0.61 MG/DL (ref 0.5–1.4)
EOSINOPHIL # BLD AUTO: 0.04 K/UL (ref 0–0.51)
EOSINOPHIL NFR BLD: 0.5 % (ref 0–6.9)
ERYTHROCYTE [DISTWIDTH] IN BLOOD BY AUTOMATED COUNT: 50.2 FL (ref 35.9–50)
GFR SERPLBLD CREATININE-BSD FMLA CKD-EPI: 122 ML/MIN/1.73 M 2
GLUCOSE SERPL-MCNC: 112 MG/DL (ref 65–99)
HCT VFR BLD AUTO: 29.2 % (ref 42–52)
HGB BLD-MCNC: 9.3 G/DL (ref 14–18)
IMM GRANULOCYTES # BLD AUTO: 0.08 K/UL (ref 0–0.11)
IMM GRANULOCYTES NFR BLD AUTO: 1.1 % (ref 0–0.9)
LYMPHOCYTES # BLD AUTO: 1.91 K/UL (ref 1–4.8)
LYMPHOCYTES NFR BLD: 25.4 % (ref 22–41)
MCH RBC QN AUTO: 30.6 PG (ref 27–33)
MCHC RBC AUTO-ENTMCNC: 31.8 G/DL (ref 33.7–35.3)
MCV RBC AUTO: 96.1 FL (ref 81.4–97.8)
MONOCYTES # BLD AUTO: 0.71 K/UL (ref 0–0.85)
MONOCYTES NFR BLD AUTO: 9.5 % (ref 0–13.4)
NEUTROPHILS # BLD AUTO: 4.74 K/UL (ref 1.82–7.42)
NEUTROPHILS NFR BLD: 63.1 % (ref 44–72)
NRBC # BLD AUTO: 0 K/UL
NRBC BLD-RTO: 0 /100 WBC
PHOSPHATE SERPL-MCNC: 2.4 MG/DL (ref 2.5–4.5)
PLATELET # BLD AUTO: 148 K/UL (ref 164–446)
PMV BLD AUTO: 10.3 FL (ref 9–12.9)
POTASSIUM SERPL-SCNC: 3.5 MMOL/L (ref 3.6–5.5)
RBC # BLD AUTO: 3.04 M/UL (ref 4.7–6.1)
SODIUM SERPL-SCNC: 139 MMOL/L (ref 135–145)
WBC # BLD AUTO: 7.5 K/UL (ref 4.8–10.8)

## 2023-05-20 PROCEDURE — 99233 SBSQ HOSP IP/OBS HIGH 50: CPT | Performed by: NURSE PRACTITIONER

## 2023-05-20 PROCEDURE — 84100 ASSAY OF PHOSPHORUS: CPT

## 2023-05-20 PROCEDURE — 85025 COMPLETE CBC W/AUTO DIFF WBC: CPT

## 2023-05-20 PROCEDURE — 700111 HCHG RX REV CODE 636 W/ 250 OVERRIDE (IP)

## 2023-05-20 PROCEDURE — A9270 NON-COVERED ITEM OR SERVICE: HCPCS | Performed by: NURSE PRACTITIONER

## 2023-05-20 PROCEDURE — 36415 COLL VENOUS BLD VENIPUNCTURE: CPT

## 2023-05-20 PROCEDURE — 700102 HCHG RX REV CODE 250 W/ 637 OVERRIDE(OP): Performed by: SURGERY

## 2023-05-20 PROCEDURE — A9270 NON-COVERED ITEM OR SERVICE: HCPCS

## 2023-05-20 PROCEDURE — 700102 HCHG RX REV CODE 250 W/ 637 OVERRIDE(OP)

## 2023-05-20 PROCEDURE — 97602 WOUND(S) CARE NON-SELECTIVE: CPT

## 2023-05-20 PROCEDURE — A9270 NON-COVERED ITEM OR SERVICE: HCPCS | Performed by: SURGERY

## 2023-05-20 PROCEDURE — 770006 HCHG ROOM/CARE - MED/SURG/GYN SEMI*

## 2023-05-20 PROCEDURE — A9270 NON-COVERED ITEM OR SERVICE: HCPCS | Performed by: PHYSICAL MEDICINE & REHABILITATION

## 2023-05-20 PROCEDURE — 700101 HCHG RX REV CODE 250: Performed by: SURGERY

## 2023-05-20 PROCEDURE — 71045 X-RAY EXAM CHEST 1 VIEW: CPT

## 2023-05-20 PROCEDURE — 94760 N-INVAS EAR/PLS OXIMETRY 1: CPT

## 2023-05-20 PROCEDURE — 80048 BASIC METABOLIC PNL TOTAL CA: CPT

## 2023-05-20 PROCEDURE — 700102 HCHG RX REV CODE 250 W/ 637 OVERRIDE(OP): Performed by: PHYSICAL MEDICINE & REHABILITATION

## 2023-05-20 PROCEDURE — 700102 HCHG RX REV CODE 250 W/ 637 OVERRIDE(OP): Performed by: NURSE PRACTITIONER

## 2023-05-20 RX ORDER — GABAPENTIN 100 MG/1
100 CAPSULE ORAL 3 TIMES DAILY
Status: DISCONTINUED | OUTPATIENT
Start: 2023-05-20 | End: 2023-05-21

## 2023-05-20 RX ADMIN — LIDOCAINE 1 PATCH: 50 PATCH TOPICAL at 05:35

## 2023-05-20 RX ADMIN — OXYCODONE HYDROCHLORIDE 10 MG: 10 TABLET ORAL at 11:31

## 2023-05-20 RX ADMIN — OXYCODONE HYDROCHLORIDE 5 MG: 5 TABLET ORAL at 01:05

## 2023-05-20 RX ADMIN — ACETAMINOPHEN 1000 MG: 500 TABLET, FILM COATED ORAL at 00:29

## 2023-05-20 RX ADMIN — ACETAMINOPHEN 1000 MG: 500 TABLET, FILM COATED ORAL at 05:36

## 2023-05-20 RX ADMIN — ENOXAPARIN SODIUM 30 MG: 100 INJECTION SUBCUTANEOUS at 18:06

## 2023-05-20 RX ADMIN — GABAPENTIN 100 MG: 100 CAPSULE ORAL at 18:06

## 2023-05-20 RX ADMIN — MORPHINE SULFATE 15 MG: 15 TABLET, FILM COATED, EXTENDED RELEASE ORAL at 18:06

## 2023-05-20 RX ADMIN — MIDODRINE HYDROCHLORIDE 5 MG: 5 TABLET ORAL at 07:16

## 2023-05-20 RX ADMIN — OXYCODONE HYDROCHLORIDE 10 MG: 10 TABLET ORAL at 07:17

## 2023-05-20 RX ADMIN — BISACODYL 10 MG: 10 SUPPOSITORY RECTAL at 05:36

## 2023-05-20 RX ADMIN — ACETAMINOPHEN 1000 MG: 500 TABLET, FILM COATED ORAL at 18:06

## 2023-05-20 RX ADMIN — DIAZEPAM 5 MG: 5 TABLET ORAL at 21:03

## 2023-05-20 RX ADMIN — POLYETHYLENE GLYCOL 3350 1 PACKET: 17 POWDER, FOR SOLUTION ORAL at 05:35

## 2023-05-20 RX ADMIN — DIBASIC SODIUM PHOSPHATE, MONOBASIC POTASSIUM PHOSPHATE AND MONOBASIC SODIUM PHOSPHATE 500 MG: 852; 155; 130 TABLET ORAL at 05:36

## 2023-05-20 RX ADMIN — SENNOSIDES AND DOCUSATE SODIUM 1 TABLET: 50; 8.6 TABLET ORAL at 21:03

## 2023-05-20 RX ADMIN — DOCUSATE SODIUM 100 MG: 50 LIQUID ORAL at 18:06

## 2023-05-20 RX ADMIN — MIDODRINE HYDROCHLORIDE 5 MG: 5 TABLET ORAL at 11:31

## 2023-05-20 RX ADMIN — DIBASIC SODIUM PHOSPHATE, MONOBASIC POTASSIUM PHOSPHATE AND MONOBASIC SODIUM PHOSPHATE 500 MG: 852; 155; 130 TABLET ORAL at 18:06

## 2023-05-20 RX ADMIN — DIBASIC SODIUM PHOSPHATE, MONOBASIC POTASSIUM PHOSPHATE AND MONOBASIC SODIUM PHOSPHATE 500 MG: 852; 155; 130 TABLET ORAL at 00:28

## 2023-05-20 RX ADMIN — GABAPENTIN 100 MG: 100 CAPSULE ORAL at 11:31

## 2023-05-20 RX ADMIN — MORPHINE SULFATE 15 MG: 15 TABLET, FILM COATED, EXTENDED RELEASE ORAL at 05:36

## 2023-05-20 RX ADMIN — MIDODRINE HYDROCHLORIDE 5 MG: 5 TABLET ORAL at 18:06

## 2023-05-20 RX ADMIN — ACETAMINOPHEN 1000 MG: 500 TABLET, FILM COATED ORAL at 11:31

## 2023-05-20 RX ADMIN — DIAZEPAM 5 MG: 5 TABLET ORAL at 00:29

## 2023-05-20 RX ADMIN — DIBASIC SODIUM PHOSPHATE, MONOBASIC POTASSIUM PHOSPHATE AND MONOBASIC SODIUM PHOSPHATE 500 MG: 852; 155; 130 TABLET ORAL at 11:31

## 2023-05-20 RX ADMIN — OXYCODONE HYDROCHLORIDE 10 MG: 10 TABLET ORAL at 18:39

## 2023-05-20 RX ADMIN — DOCUSATE SODIUM 100 MG: 50 LIQUID ORAL at 05:35

## 2023-05-20 RX ADMIN — POLYETHYLENE GLYCOL 3350 1 PACKET: 17 POWDER, FOR SOLUTION ORAL at 18:06

## 2023-05-20 RX ADMIN — ENOXAPARIN SODIUM 30 MG: 100 INJECTION SUBCUTANEOUS at 05:35

## 2023-05-20 ASSESSMENT — ENCOUNTER SYMPTOMS
COUGH: 0
SHORTNESS OF BREATH: 0
NAUSEA: 0
ROS GI COMMENTS: 5/19 BM
DIZZINESS: 0
NECK PAIN: 0
VOMITING: 0
SPEECH CHANGE: 0
HEADACHES: 0
FEVER: 0
ABDOMINAL PAIN: 0
SENSORY CHANGE: 1
BACK PAIN: 1
MYALGIAS: 1
TINGLING: 1
BLURRED VISION: 0
DOUBLE VISION: 0
CHILLS: 0
WEAKNESS: 1

## 2023-05-20 ASSESSMENT — PAIN DESCRIPTION - PAIN TYPE
TYPE: ACUTE PAIN
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
## 2023-05-20 NOTE — PROGRESS NOTES
4 Eyes Skin Assessment Completed by OBIE Salmeron and OBIE Locke.    Head WDL  Ears WDL  Nose WDL  Mouth WDL  Neck WDL  Breast/Chest Bruising and Discoloration, left chest/shoulder area. Right central line  Shoulder Blades WDL, anterior left bruising  Spine Incision with prevena wound vac, x2 hemovac drains  (R) Arm/Elbow/Hand Bruising  (L) Arm/Elbow/Hand WDL  Abdomen right flank bruising  Groin WDL  Scrotum/Coccyx/Buttocks WDL  (R) Leg WDL  (L) Leg Abrasion, left lateral thigh partial thickness wound x2  (R) Heel/Foot/Toe WDL  (L) Heel/Foot/Toe Bruising, inner left heel          Devices In Places Blood Pressure Cuff, Pulse Ox, Central Line, and Nasal Cannula, foot drop boot      Interventions In Place Sacral Mepilex, Chair Waffle, Pillows, Q2 Turns, Heels Loaded W/Pillows, and Pressure Redistribution Mattress    Possible Skin Injury Yes    Pictures Uploaded Into Epic Yes  Wound Consult Placed Yes  RN Wound Prevention Protocol Ordered Yes

## 2023-05-20 NOTE — CARE PLAN
The patient is Stable - Low risk of patient condition declining or worsening    Shift Goals  Clinical Goals: pain management  Patient Goals: pain management  Family Goals: Healing, updates    Progress made toward(s) clinical / shift goals:    Problem: Pain - Standard  Goal: Alleviation of pain or a reduction in pain to the patient’s comfort goal  Outcome: Progressing     Problem: Skin Integrity  Goal: Skin integrity is maintained or improved  Outcome: Progressing     Problem: Fall Risk  Goal: Patient will remain free from falls  Outcome: Progressing       Patient is not progressing towards the following goals:

## 2023-05-20 NOTE — PROGRESS NOTES
Neurosurgery Progress Note    Subjective:  Sitting up in bed. Still with decent back pain.  Persisting tingling and sensation in feet.  +BM 5/19    Exam:  Awake and alert    RLE                 LLE  Iliopsoas                         4/5                   3/5  Quadriceps                     4+/5                  4/5   Dorsiflexion                   0/5                    0/5  Eversion                        0/5                    0/5   EHL                               0/5                    0/5  Plantarflexion                 0/5                   0/5  Hamstrings                     4/5                  4/5    Posterior thoracic incision dressed with Prevena. Subfascial Hvx2- 40/50ml    Recent Labs     05/18/23 0413 05/19/23  0416 05/19/23  0941 05/20/23  0301   WBC 10.6 7.4  --  7.5   RBC 3.27* 2.94*  --  3.04*   HEMOGLOBIN 10.2* 9.0* 9.0* 9.3*   HEMATOCRIT 30.9* 27.9*  --  29.2*   MCV 94.5 94.9  --  96.1   MCH 31.2 30.6  --  30.6   MCHC 33.0* 32.3*  --  31.8*   RDW 50.0 49.5  --  50.2*   PLATELETCT 128* 119*  --  148*   MPV 10.9 11.0  --  10.3       Recent Labs     05/18/23 0413 05/19/23 0416 05/20/23  0301   SODIUM 140 138 139   POTASSIUM 4.0 3.6 3.5*   CHLORIDE 103 100 102   CO2 27 31 31   GLUCOSE 125* 134* 112*   BUN 7* 5* 4*   CREATININE 0.84 0.71 0.61   CALCIUM 6.7* 7.0* 7.5*                     Assessment and Plan:  Hospital day # 5  POD# 4, s/p T11-T12 laminectomy, T11-L1 posterior instrumented fusion, ROBERTO C T12 level spinal cord injury.    - Neuro checks Q4  - MAP > 85mmHg x5d (stop 5/21)  - Continue drain to self-suction. X2, output decreasing  - C-collar cleared  - Remove Prevena when battery runs out   - PT/OT- TLSO on when AB Saab

## 2023-05-20 NOTE — CARE PLAN
The patient is Stable - Low risk of patient condition declining or worsening    Shift Goals  Clinical Goals: pain management, mobility, MAP >85, stable neuro checks  Patient Goals: pain management  Family Goals: pain management, mobility    Progress made toward(s) clinical / shift goals:  MAPs all above 85. Neuro checks stable. Bilateral hemovacs removed. Pain controlled with oxycodone and scheduled medications. All fall precautions in place. Q2 turns for skin integrity.   Problem: Knowledge Deficit - Standard  Goal: Patient and family/care givers will demonstrate understanding of plan of care, disease process/condition, diagnostic tests and medications  Description: Target End Date:  1-3 days or as soon as patient condition allows    Document in Patient Education    1.  Patient and family/caregiver oriented to unit, equipment, visitation policy and means for communicating concern  2.  Complete/review Learning Assessment  3.  Assess knowledge level of disease process/condition, treatment plan, diagnostic tests and medications  4.  Explain disease process/condition, treatment plan, diagnostic tests and medications  Outcome: Progressing     Problem: Pain - Standard  Goal: Alleviation of pain or a reduction in pain to the patient’s comfort goal  Description: Target End Date:  Prior to discharge or change in level of care    Document on Vitals flowsheet    1.  Document pain using the appropriate pain scale per order or unit policy  2.  Educate and implement non-pharmacologic comfort measures (i.e. relaxation, distraction, massage, cold/heat therapy, etc.)  3.  Pain management medications as ordered  4.  Reassess pain after pain med administration per policy  5.  If opiods administered assess patient's response to pain medication is appropriate per POSS sedation scale  6.  Follow pain management plan developed in collaboration with patient and interdisciplinary team (including palliative care or pain specialists if  applicable)  Outcome: Progressing     Problem: Skin Integrity  Goal: Skin integrity is maintained or improved  Description: Target End Date:  Prior to discharge or change in level of care    Document interventions on Skin Risk/Alexis flowsheet groups and corresponding LDA    1.  Assess and monitor skin integrity, appearance and/or temperature  2.  Assess risk factors for impaired skin integrity and/or pressures ulcers  3.  Implement precautions to protect skin integrity in collaboration with interdisciplinary team  4.  Implement pressure ulcer prevention protocol if at risk for skin breakdown  5.  Confirm wound care consult if at risk for skin breakdown  6.  Ensure patient use of pressure relieving devices  (Low air loss bed, waffle overlay, heel protectors, ROHO cushion, etc)  Outcome: Progressing     Problem: Fall Risk  Goal: Patient will remain free from falls  Description: Target End Date:  Prior to discharge or change in level of care    Document interventions on the Arriola Melvin Fall Risk Assessment    1.  Assess for fall risk factors  2.  Implement fall precautions  Outcome: Progressing       Patient is not progressing towards the following goals:
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
## 2023-05-21 ENCOUNTER — APPOINTMENT (OUTPATIENT)
Dept: RADIOLOGY | Facility: MEDICAL CENTER | Age: 43
DRG: 957 | End: 2023-05-21
Attending: SURGERY
Payer: COMMERCIAL

## 2023-05-21 LAB
ANION GAP SERPL CALC-SCNC: 10 MMOL/L (ref 7–16)
BASOPHILS # BLD AUTO: 0.2 % (ref 0–1.8)
BASOPHILS # BLD: 0.02 K/UL (ref 0–0.12)
BUN SERPL-MCNC: 6 MG/DL (ref 8–22)
CALCIUM SERPL-MCNC: 8 MG/DL (ref 8.5–10.5)
CHLORIDE SERPL-SCNC: 102 MMOL/L (ref 96–112)
CO2 SERPL-SCNC: 28 MMOL/L (ref 20–33)
CREAT SERPL-MCNC: 0.7 MG/DL (ref 0.5–1.4)
EOSINOPHIL # BLD AUTO: 0.07 K/UL (ref 0–0.51)
EOSINOPHIL NFR BLD: 0.8 % (ref 0–6.9)
ERYTHROCYTE [DISTWIDTH] IN BLOOD BY AUTOMATED COUNT: 48.4 FL (ref 35.9–50)
GFR SERPLBLD CREATININE-BSD FMLA CKD-EPI: 117 ML/MIN/1.73 M 2
GLUCOSE SERPL-MCNC: 128 MG/DL (ref 65–99)
HCT VFR BLD AUTO: 31.3 % (ref 42–52)
HGB BLD-MCNC: 10.2 G/DL (ref 14–18)
IMM GRANULOCYTES # BLD AUTO: 0.11 K/UL (ref 0–0.11)
IMM GRANULOCYTES NFR BLD AUTO: 1.3 % (ref 0–0.9)
LYMPHOCYTES # BLD AUTO: 1.36 K/UL (ref 1–4.8)
LYMPHOCYTES NFR BLD: 16.1 % (ref 22–41)
MCH RBC QN AUTO: 30.6 PG (ref 27–33)
MCHC RBC AUTO-ENTMCNC: 32.6 G/DL (ref 33.7–35.3)
MCV RBC AUTO: 94 FL (ref 81.4–97.8)
MONOCYTES # BLD AUTO: 0.83 K/UL (ref 0–0.85)
MONOCYTES NFR BLD AUTO: 9.8 % (ref 0–13.4)
NEUTROPHILS # BLD AUTO: 6.06 K/UL (ref 1.82–7.42)
NEUTROPHILS NFR BLD: 71.8 % (ref 44–72)
NRBC # BLD AUTO: 0.03 K/UL
NRBC BLD-RTO: 0.4 /100 WBC
PHOSPHATE SERPL-MCNC: 2.9 MG/DL (ref 2.5–4.5)
PLATELET # BLD AUTO: 209 K/UL (ref 164–446)
PMV BLD AUTO: 9.8 FL (ref 9–12.9)
POTASSIUM SERPL-SCNC: 3.6 MMOL/L (ref 3.6–5.5)
RBC # BLD AUTO: 3.33 M/UL (ref 4.7–6.1)
SODIUM SERPL-SCNC: 140 MMOL/L (ref 135–145)
WBC # BLD AUTO: 8.5 K/UL (ref 4.8–10.8)

## 2023-05-21 PROCEDURE — 85025 COMPLETE CBC W/AUTO DIFF WBC: CPT

## 2023-05-21 PROCEDURE — A9270 NON-COVERED ITEM OR SERVICE: HCPCS | Performed by: SURGERY

## 2023-05-21 PROCEDURE — 51798 US URINE CAPACITY MEASURE: CPT

## 2023-05-21 PROCEDURE — 700101 HCHG RX REV CODE 250: Performed by: SURGERY

## 2023-05-21 PROCEDURE — 700111 HCHG RX REV CODE 636 W/ 250 OVERRIDE (IP): Performed by: SURGERY

## 2023-05-21 PROCEDURE — 99233 SBSQ HOSP IP/OBS HIGH 50: CPT | Performed by: NURSE PRACTITIONER

## 2023-05-21 PROCEDURE — 700102 HCHG RX REV CODE 250 W/ 637 OVERRIDE(OP): Performed by: SURGERY

## 2023-05-21 PROCEDURE — 700102 HCHG RX REV CODE 250 W/ 637 OVERRIDE(OP): Performed by: NURSE PRACTITIONER

## 2023-05-21 PROCEDURE — 80048 BASIC METABOLIC PNL TOTAL CA: CPT

## 2023-05-21 PROCEDURE — 770020 HCHG ROOM/CARE - TELE (206)

## 2023-05-21 PROCEDURE — A9270 NON-COVERED ITEM OR SERVICE: HCPCS | Performed by: PHYSICAL MEDICINE & REHABILITATION

## 2023-05-21 PROCEDURE — 36415 COLL VENOUS BLD VENIPUNCTURE: CPT

## 2023-05-21 PROCEDURE — A9270 NON-COVERED ITEM OR SERVICE: HCPCS

## 2023-05-21 PROCEDURE — 700102 HCHG RX REV CODE 250 W/ 637 OVERRIDE(OP): Performed by: PHYSICAL MEDICINE & REHABILITATION

## 2023-05-21 PROCEDURE — 71045 X-RAY EXAM CHEST 1 VIEW: CPT

## 2023-05-21 PROCEDURE — 700102 HCHG RX REV CODE 250 W/ 637 OVERRIDE(OP)

## 2023-05-21 PROCEDURE — 700111 HCHG RX REV CODE 636 W/ 250 OVERRIDE (IP)

## 2023-05-21 PROCEDURE — 84100 ASSAY OF PHOSPHORUS: CPT

## 2023-05-21 PROCEDURE — A9270 NON-COVERED ITEM OR SERVICE: HCPCS | Performed by: NURSE PRACTITIONER

## 2023-05-21 RX ORDER — ONDANSETRON 4 MG/1
4 TABLET, ORALLY DISINTEGRATING ORAL EVERY 4 HOURS PRN
Status: DISCONTINUED | OUTPATIENT
Start: 2023-05-21 | End: 2023-05-23 | Stop reason: HOSPADM

## 2023-05-21 RX ORDER — ACETAMINOPHEN 500 MG
1000 TABLET ORAL EVERY 6 HOURS
Status: COMPLETED | OUTPATIENT
Start: 2023-05-21 | End: 2023-05-21

## 2023-05-21 RX ORDER — POLYETHYLENE GLYCOL 3350 17 G/17G
1 POWDER, FOR SOLUTION ORAL 2 TIMES DAILY
Status: DISCONTINUED | OUTPATIENT
Start: 2023-05-21 | End: 2023-05-23 | Stop reason: HOSPADM

## 2023-05-21 RX ORDER — AMOXICILLIN 250 MG
1 CAPSULE ORAL
Status: DISCONTINUED | OUTPATIENT
Start: 2023-05-21 | End: 2023-05-23 | Stop reason: HOSPADM

## 2023-05-21 RX ORDER — DOCUSATE SODIUM 100 MG/1
100 CAPSULE, LIQUID FILLED ORAL 2 TIMES DAILY
Status: DISCONTINUED | OUTPATIENT
Start: 2023-05-21 | End: 2023-05-23 | Stop reason: HOSPADM

## 2023-05-21 RX ORDER — ACETAMINOPHEN 500 MG
1000 TABLET ORAL EVERY 6 HOURS PRN
Status: DISCONTINUED | OUTPATIENT
Start: 2023-05-22 | End: 2023-05-23 | Stop reason: HOSPADM

## 2023-05-21 RX ORDER — HYDROMORPHONE HYDROCHLORIDE 1 MG/ML
0.5 INJECTION, SOLUTION INTRAMUSCULAR; INTRAVENOUS; SUBCUTANEOUS
Status: DISCONTINUED | OUTPATIENT
Start: 2023-05-21 | End: 2023-05-23

## 2023-05-21 RX ORDER — OXYCODONE HYDROCHLORIDE 5 MG/1
5 TABLET ORAL
Status: DISCONTINUED | OUTPATIENT
Start: 2023-05-21 | End: 2023-05-23 | Stop reason: HOSPADM

## 2023-05-21 RX ORDER — OXYCODONE HYDROCHLORIDE 10 MG/1
10 TABLET ORAL
Status: DISCONTINUED | OUTPATIENT
Start: 2023-05-21 | End: 2023-05-23 | Stop reason: HOSPADM

## 2023-05-21 RX ORDER — GABAPENTIN 100 MG/1
200 CAPSULE ORAL 3 TIMES DAILY
Status: DISCONTINUED | OUTPATIENT
Start: 2023-05-21 | End: 2023-05-23 | Stop reason: HOSPADM

## 2023-05-21 RX ORDER — AMOXICILLIN 250 MG
1 CAPSULE ORAL NIGHTLY
Status: DISCONTINUED | OUTPATIENT
Start: 2023-05-21 | End: 2023-05-23 | Stop reason: HOSPADM

## 2023-05-21 RX ADMIN — ENOXAPARIN SODIUM 30 MG: 100 INJECTION SUBCUTANEOUS at 17:56

## 2023-05-21 RX ADMIN — POLYETHYLENE GLYCOL 3350 1 PACKET: 17 POWDER, FOR SOLUTION ORAL at 05:08

## 2023-05-21 RX ADMIN — MORPHINE SULFATE 15 MG: 15 TABLET, FILM COATED, EXTENDED RELEASE ORAL at 17:57

## 2023-05-21 RX ADMIN — OXYCODONE HYDROCHLORIDE 10 MG: 10 TABLET ORAL at 20:03

## 2023-05-21 RX ADMIN — ACETAMINOPHEN 1000 MG: 500 TABLET, FILM COATED ORAL at 00:50

## 2023-05-21 RX ADMIN — ACETAMINOPHEN 1000 MG: 500 TABLET, FILM COATED ORAL at 17:55

## 2023-05-21 RX ADMIN — OXYCODONE HYDROCHLORIDE 10 MG: 10 TABLET ORAL at 00:51

## 2023-05-21 RX ADMIN — GABAPENTIN 200 MG: 100 CAPSULE ORAL at 11:19

## 2023-05-21 RX ADMIN — BISACODYL 10 MG: 10 SUPPOSITORY RECTAL at 05:08

## 2023-05-21 RX ADMIN — ACETAMINOPHEN 1000 MG: 500 TABLET, FILM COATED ORAL at 05:08

## 2023-05-21 RX ADMIN — MIDODRINE HYDROCHLORIDE 5 MG: 5 TABLET ORAL at 11:20

## 2023-05-21 RX ADMIN — DOCUSATE SODIUM 100 MG: 50 LIQUID ORAL at 05:08

## 2023-05-21 RX ADMIN — MIDODRINE HYDROCHLORIDE 5 MG: 5 TABLET ORAL at 08:32

## 2023-05-21 RX ADMIN — OXYCODONE HYDROCHLORIDE 10 MG: 10 TABLET ORAL at 15:30

## 2023-05-21 RX ADMIN — OXYCODONE HYDROCHLORIDE 10 MG: 10 TABLET ORAL at 06:16

## 2023-05-21 RX ADMIN — GABAPENTIN 100 MG: 100 CAPSULE ORAL at 05:08

## 2023-05-21 RX ADMIN — GABAPENTIN 200 MG: 100 CAPSULE ORAL at 17:56

## 2023-05-21 RX ADMIN — ACETAMINOPHEN 1000 MG: 500 TABLET, FILM COATED ORAL at 11:19

## 2023-05-21 RX ADMIN — DIAZEPAM 5 MG: 5 TABLET ORAL at 05:08

## 2023-05-21 RX ADMIN — HYDROMORPHONE HYDROCHLORIDE 0.5 MG: 1 INJECTION, SOLUTION INTRAMUSCULAR; INTRAVENOUS; SUBCUTANEOUS at 11:18

## 2023-05-21 RX ADMIN — MORPHINE SULFATE 15 MG: 15 TABLET, FILM COATED, EXTENDED RELEASE ORAL at 05:08

## 2023-05-21 RX ADMIN — LIDOCAINE 1 PATCH: 50 PATCH TOPICAL at 05:08

## 2023-05-21 RX ADMIN — POLYETHYLENE GLYCOL 3350 1 PACKET: 17 POWDER, FOR SOLUTION ORAL at 17:56

## 2023-05-21 RX ADMIN — MIDODRINE HYDROCHLORIDE 5 MG: 5 TABLET ORAL at 17:57

## 2023-05-21 RX ADMIN — ENOXAPARIN SODIUM 30 MG: 100 INJECTION SUBCUTANEOUS at 05:08

## 2023-05-21 RX ADMIN — DIAZEPAM 5 MG: 5 TABLET ORAL at 15:31

## 2023-05-21 RX ADMIN — DOCUSATE SODIUM 100 MG: 100 CAPSULE, LIQUID FILLED ORAL at 17:56

## 2023-05-21 RX ADMIN — OXYCODONE HYDROCHLORIDE 10 MG: 10 TABLET ORAL at 09:27

## 2023-05-21 RX ADMIN — SENNOSIDES AND DOCUSATE SODIUM 1 TABLET: 50; 8.6 TABLET ORAL at 20:04

## 2023-05-21 ASSESSMENT — ENCOUNTER SYMPTOMS
ROS GI COMMENTS: 5/20 BM
ABDOMINAL PAIN: 0
BLURRED VISION: 0
DOUBLE VISION: 0
SENSORY CHANGE: 1
DIZZINESS: 0
SPEECH CHANGE: 0
TINGLING: 1
COUGH: 0
BACK PAIN: 1
NECK PAIN: 0
NAUSEA: 0
MYALGIAS: 1
HEADACHES: 0
CHILLS: 0
WEAKNESS: 1
FEVER: 0
VOMITING: 0
SHORTNESS OF BREATH: 0

## 2023-05-21 ASSESSMENT — PAIN DESCRIPTION - PAIN TYPE
TYPE: ACUTE PAIN
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
## 2023-05-21 NOTE — CARE PLAN
The patient is Stable - Low risk of patient condition declining or worsening    Shift Goals  Clinical Goals: pain mangement, monitor MAP  Patient Goals: pain management  Family Goals: pain management, mobility    Progress made toward(s) clinical / shift goals:    Problem: Pain - Standard  Goal: Alleviation of pain or a reduction in pain to the patient’s comfort goal  Outcome: Progressing     Problem: Skin Integrity  Goal: Skin integrity is maintained or improved  Outcome: Progressing     Problem: Fall Risk  Goal: Patient will remain free from falls  Outcome: Progressing       Patient is not progressing towards the following goals:

## 2023-05-21 NOTE — PROGRESS NOTES
Neurosurgery Progress Note    Subjective:  Sitting up in bed. Still with decent back pain.  Persisting tingling and sensation in feet.      Exam:  RLE                 LLE  Iliopsoas                         4/5                   3/5  Quadriceps                     4+/5                  4/5   Dorsiflexion                   0/5                    0/5  EHL                               0/5                    0/5  Plantarflexion                 0/5                   0/5      Posterior thoracic incision dressed with Prevena.     Recent Labs     05/19/23  0416 05/19/23  0941 05/20/23  0301 05/21/23  0206   WBC 7.4  --  7.5 8.5   RBC 2.94*  --  3.04* 3.33*   HEMOGLOBIN 9.0* 9.0* 9.3* 10.2*   HEMATOCRIT 27.9*  --  29.2* 31.3*   MCV 94.9  --  96.1 94.0   MCH 30.6  --  30.6 30.6   MCHC 32.3*  --  31.8* 32.6*   RDW 49.5  --  50.2* 48.4   PLATELETCT 119*  --  148* 209   MPV 11.0  --  10.3 9.8       Recent Labs     05/19/23  0416 05/20/23  0301 05/21/23  0206   SODIUM 138 139 140   POTASSIUM 3.6 3.5* 3.6   CHLORIDE 100 102 102   CO2 31 31 28   GLUCOSE 134* 112* 128*   BUN 5* 4* 6*   CREATININE 0.71 0.61 0.70   CALCIUM 7.0* 7.5* 8.0*                     Assessment and Plan:  Hospital day # 6  POD# 5 s/p T11-T12 laminectomy, T11-L1 posterior instrumented fusion, ROBERTO C T12 level spinal cord injury.    - Neuro checks Q4  - MAP > 85mmHg x5d (stop 5/21)- self mapping  - C-collar cleared  - Remove Prevena when battery runs out   - PT/OT- TLSO on when oob  DW trauma    MARY Bermudez.

## 2023-05-22 ENCOUNTER — APPOINTMENT (OUTPATIENT)
Dept: RADIOLOGY | Facility: MEDICAL CENTER | Age: 43
DRG: 957 | End: 2023-05-22
Attending: SURGERY
Payer: COMMERCIAL

## 2023-05-22 PROBLEM — M25.562 ACUTE PAIN OF LEFT KNEE: Status: ACTIVE | Noted: 2023-05-22

## 2023-05-22 LAB
ANION GAP SERPL CALC-SCNC: 10 MMOL/L (ref 7–16)
BASOPHILS # BLD AUTO: 0.5 % (ref 0–1.8)
BASOPHILS # BLD: 0.05 K/UL (ref 0–0.12)
BUN SERPL-MCNC: 7 MG/DL (ref 8–22)
CALCIUM SERPL-MCNC: 8 MG/DL (ref 8.5–10.5)
CHLORIDE SERPL-SCNC: 102 MMOL/L (ref 96–112)
CO2 SERPL-SCNC: 26 MMOL/L (ref 20–33)
CREAT SERPL-MCNC: 0.59 MG/DL (ref 0.5–1.4)
EOSINOPHIL # BLD AUTO: 0.12 K/UL (ref 0–0.51)
EOSINOPHIL NFR BLD: 1.1 % (ref 0–6.9)
ERYTHROCYTE [DISTWIDTH] IN BLOOD BY AUTOMATED COUNT: 48 FL (ref 35.9–50)
GFR SERPLBLD CREATININE-BSD FMLA CKD-EPI: 123 ML/MIN/1.73 M 2
GLUCOSE SERPL-MCNC: 113 MG/DL (ref 65–99)
HCT VFR BLD AUTO: 31.9 % (ref 42–52)
HGB BLD-MCNC: 10.4 G/DL (ref 14–18)
IMM GRANULOCYTES # BLD AUTO: 0.2 K/UL (ref 0–0.11)
IMM GRANULOCYTES NFR BLD AUTO: 1.9 % (ref 0–0.9)
LYMPHOCYTES # BLD AUTO: 1.73 K/UL (ref 1–4.8)
LYMPHOCYTES NFR BLD: 16.2 % (ref 22–41)
MAGNESIUM SERPL-MCNC: 2 MG/DL (ref 1.5–2.5)
MCH RBC QN AUTO: 30.7 PG (ref 27–33)
MCHC RBC AUTO-ENTMCNC: 32.6 G/DL (ref 33.7–35.3)
MCV RBC AUTO: 94.1 FL (ref 81.4–97.8)
MONOCYTES # BLD AUTO: 1.03 K/UL (ref 0–0.85)
MONOCYTES NFR BLD AUTO: 9.7 % (ref 0–13.4)
NEUTROPHILS # BLD AUTO: 7.53 K/UL (ref 1.82–7.42)
NEUTROPHILS NFR BLD: 70.6 % (ref 44–72)
NRBC # BLD AUTO: 0.03 K/UL
NRBC BLD-RTO: 0.3 /100 WBC
PHOSPHATE SERPL-MCNC: 2.9 MG/DL (ref 2.5–4.5)
PLATELET # BLD AUTO: 243 K/UL (ref 164–446)
PMV BLD AUTO: 9.6 FL (ref 9–12.9)
POTASSIUM SERPL-SCNC: 3.7 MMOL/L (ref 3.6–5.5)
RBC # BLD AUTO: 3.39 M/UL (ref 4.7–6.1)
SODIUM SERPL-SCNC: 138 MMOL/L (ref 135–145)
WBC # BLD AUTO: 10.7 K/UL (ref 4.8–10.8)

## 2023-05-22 PROCEDURE — 700102 HCHG RX REV CODE 250 W/ 637 OVERRIDE(OP): Performed by: NURSE PRACTITIONER

## 2023-05-22 PROCEDURE — 71045 X-RAY EXAM CHEST 1 VIEW: CPT

## 2023-05-22 PROCEDURE — 700102 HCHG RX REV CODE 250 W/ 637 OVERRIDE(OP): Performed by: PHYSICAL MEDICINE & REHABILITATION

## 2023-05-22 PROCEDURE — 770020 HCHG ROOM/CARE - TELE (206)

## 2023-05-22 PROCEDURE — 97110 THERAPEUTIC EXERCISES: CPT

## 2023-05-22 PROCEDURE — 85025 COMPLETE CBC W/AUTO DIFF WBC: CPT

## 2023-05-22 PROCEDURE — 99232 SBSQ HOSP IP/OBS MODERATE 35: CPT | Performed by: NURSE PRACTITIONER

## 2023-05-22 PROCEDURE — 700111 HCHG RX REV CODE 636 W/ 250 OVERRIDE (IP)

## 2023-05-22 PROCEDURE — 83735 ASSAY OF MAGNESIUM: CPT

## 2023-05-22 PROCEDURE — 700102 HCHG RX REV CODE 250 W/ 637 OVERRIDE(OP): Performed by: SURGERY

## 2023-05-22 PROCEDURE — 36415 COLL VENOUS BLD VENIPUNCTURE: CPT

## 2023-05-22 PROCEDURE — 97530 THERAPEUTIC ACTIVITIES: CPT

## 2023-05-22 PROCEDURE — 700111 HCHG RX REV CODE 636 W/ 250 OVERRIDE (IP): Performed by: SURGERY

## 2023-05-22 PROCEDURE — 80048 BASIC METABOLIC PNL TOTAL CA: CPT

## 2023-05-22 PROCEDURE — 51798 US URINE CAPACITY MEASURE: CPT

## 2023-05-22 PROCEDURE — A9270 NON-COVERED ITEM OR SERVICE: HCPCS | Performed by: SURGERY

## 2023-05-22 PROCEDURE — A9270 NON-COVERED ITEM OR SERVICE: HCPCS | Performed by: NURSE PRACTITIONER

## 2023-05-22 PROCEDURE — A9270 NON-COVERED ITEM OR SERVICE: HCPCS | Performed by: PHYSICAL MEDICINE & REHABILITATION

## 2023-05-22 PROCEDURE — 700101 HCHG RX REV CODE 250: Performed by: SURGERY

## 2023-05-22 PROCEDURE — 84100 ASSAY OF PHOSPHORUS: CPT

## 2023-05-22 PROCEDURE — 97535 SELF CARE MNGMENT TRAINING: CPT | Mod: CO

## 2023-05-22 RX ADMIN — ENOXAPARIN SODIUM 30 MG: 100 INJECTION SUBCUTANEOUS at 17:14

## 2023-05-22 RX ADMIN — MORPHINE SULFATE 15 MG: 15 TABLET, FILM COATED, EXTENDED RELEASE ORAL at 04:28

## 2023-05-22 RX ADMIN — DIAZEPAM 5 MG: 5 TABLET ORAL at 01:27

## 2023-05-22 RX ADMIN — HYDROMORPHONE HYDROCHLORIDE 0.5 MG: 1 INJECTION, SOLUTION INTRAMUSCULAR; INTRAVENOUS; SUBCUTANEOUS at 19:24

## 2023-05-22 RX ADMIN — BISACODYL 10 MG: 10 SUPPOSITORY RECTAL at 04:28

## 2023-05-22 RX ADMIN — OXYCODONE HYDROCHLORIDE 10 MG: 10 TABLET ORAL at 07:58

## 2023-05-22 RX ADMIN — DOCUSATE SODIUM 100 MG: 100 CAPSULE, LIQUID FILLED ORAL at 17:15

## 2023-05-22 RX ADMIN — OXYCODONE HYDROCHLORIDE 10 MG: 10 TABLET ORAL at 03:46

## 2023-05-22 RX ADMIN — ACETAMINOPHEN 1000 MG: 500 TABLET, FILM COATED ORAL at 11:57

## 2023-05-22 RX ADMIN — OXYCODONE HYDROCHLORIDE 10 MG: 10 TABLET ORAL at 00:58

## 2023-05-22 RX ADMIN — HYDROMORPHONE HYDROCHLORIDE 0.5 MG: 1 INJECTION, SOLUTION INTRAMUSCULAR; INTRAVENOUS; SUBCUTANEOUS at 05:17

## 2023-05-22 RX ADMIN — DIAZEPAM 5 MG: 5 TABLET ORAL at 17:15

## 2023-05-22 RX ADMIN — OXYCODONE HYDROCHLORIDE 10 MG: 10 TABLET ORAL at 21:46

## 2023-05-22 RX ADMIN — MORPHINE SULFATE 15 MG: 15 TABLET, FILM COATED, EXTENDED RELEASE ORAL at 17:15

## 2023-05-22 RX ADMIN — MIDODRINE HYDROCHLORIDE 5 MG: 5 TABLET ORAL at 07:58

## 2023-05-22 RX ADMIN — GABAPENTIN 200 MG: 100 CAPSULE ORAL at 17:15

## 2023-05-22 RX ADMIN — OXYCODONE HYDROCHLORIDE 10 MG: 10 TABLET ORAL at 11:57

## 2023-05-22 RX ADMIN — MIDODRINE HYDROCHLORIDE 5 MG: 5 TABLET ORAL at 17:15

## 2023-05-22 RX ADMIN — DOCUSATE SODIUM 100 MG: 100 CAPSULE, LIQUID FILLED ORAL at 04:28

## 2023-05-22 RX ADMIN — MIDODRINE HYDROCHLORIDE 5 MG: 5 TABLET ORAL at 11:58

## 2023-05-22 RX ADMIN — SENNOSIDES AND DOCUSATE SODIUM 1 TABLET: 50; 8.6 TABLET ORAL at 19:25

## 2023-05-22 RX ADMIN — GABAPENTIN 200 MG: 100 CAPSULE ORAL at 11:57

## 2023-05-22 RX ADMIN — DIAZEPAM 5 MG: 5 TABLET ORAL at 07:59

## 2023-05-22 RX ADMIN — OXYCODONE HYDROCHLORIDE 10 MG: 10 TABLET ORAL at 17:15

## 2023-05-22 RX ADMIN — ENOXAPARIN SODIUM 30 MG: 100 INJECTION SUBCUTANEOUS at 04:28

## 2023-05-22 RX ADMIN — LIDOCAINE 1 PATCH: 50 PATCH TOPICAL at 04:27

## 2023-05-22 RX ADMIN — GABAPENTIN 200 MG: 100 CAPSULE ORAL at 04:28

## 2023-05-22 ASSESSMENT — ENCOUNTER SYMPTOMS
FEVER: 0
MYALGIAS: 1
ABDOMINAL PAIN: 0
DIZZINESS: 0
NAUSEA: 0
VOMITING: 0
BLURRED VISION: 0
CHILLS: 0
HEADACHES: 0
ROS GI COMMENTS: 5/21 BM
WEAKNESS: 1
TINGLING: 1
COUGH: 0
SENSORY CHANGE: 1
BACK PAIN: 1
SHORTNESS OF BREATH: 0
NECK PAIN: 0
SPEECH CHANGE: 0
DOUBLE VISION: 0

## 2023-05-22 ASSESSMENT — COGNITIVE AND FUNCTIONAL STATUS - GENERAL
SUGGESTED CMS G CODE MODIFIER MOBILITY: CN
TURNING FROM BACK TO SIDE WHILE IN FLAT BAD: UNABLE
MOVING TO AND FROM BED TO CHAIR: UNABLE
HELP NEEDED FOR BATHING: A LOT
DRESSING REGULAR LOWER BODY CLOTHING: A LOT
PERSONAL GROOMING: A LITTLE
EATING MEALS: A LITTLE
TOILETING: A LOT
STANDING UP FROM CHAIR USING ARMS: TOTAL
DRESSING REGULAR UPPER BODY CLOTHING: A LOT
MOVING FROM LYING ON BACK TO SITTING ON SIDE OF FLAT BED: UNABLE
SUGGESTED CMS G CODE MODIFIER DAILY ACTIVITY: CK
CLIMB 3 TO 5 STEPS WITH RAILING: TOTAL
WALKING IN HOSPITAL ROOM: TOTAL
DAILY ACTIVITIY SCORE: 14
MOBILITY SCORE: 6

## 2023-05-22 ASSESSMENT — GAIT ASSESSMENTS: GAIT LEVEL OF ASSIST: UNABLE TO PARTICIPATE

## 2023-05-22 ASSESSMENT — PAIN DESCRIPTION - PAIN TYPE
TYPE: ACUTE PAIN

## 2023-05-22 NOTE — CARE PLAN
Problem: Pain - Standard  Goal: Alleviation of pain or a reduction in pain to the patient’s comfort goal  Description: Target End Date:  Prior to discharge or change in level of care    Document on Vitals flowsheet    1.  Document pain using the appropriate pain scale per order or unit policy  2.  Educate and implement non-pharmacologic comfort measures (i.e. relaxation, distraction, massage, cold/heat therapy, etc.)  3.  Pain management medications as ordered  4.  Reassess pain after pain med administration per policy  5.  If opiods administered assess patient's response to pain medication is appropriate per POSS sedation scale  6.  Follow pain management plan developed in collaboration with patient and interdisciplinary team (including palliative care or pain specialists if applicable)  Outcome: Progressing     Problem: Skin Integrity  Goal: Skin integrity is maintained or improved  Description: Target End Date:  Prior to discharge or change in level of care    Document interventions on Skin Risk/Alexis flowsheet groups and corresponding LDA    1.  Assess and monitor skin integrity, appearance and/or temperature  2.  Assess risk factors for impaired skin integrity and/or pressures ulcers  3.  Implement precautions to protect skin integrity in collaboration with interdisciplinary team  4.  Implement pressure ulcer prevention protocol if at risk for skin breakdown  5.  Confirm wound care consult if at risk for skin breakdown  6.  Ensure patient use of pressure relieving devices  (Low air loss bed, waffle overlay, heel protectors, ROHO cushion, etc)  Outcome: Progressing   The patient is Watcher - Medium risk of patient condition declining or worsening    Shift Goals  Clinical Goals: oob shower  Patient Goals: pain control  Family Goals: shower    Progress made toward(s) clinical / shift goals:      Patient is not progressing towards the following goals:

## 2023-05-22 NOTE — PROGRESS NOTES
Neurosurgery Progress Note    Subjective:  No acute events overnight.  Having shoulder, knee, and back pain.  States having difficulty controlling pain overnight but better during the day.  Up OOB as tolerated, had shower yesterday.      Exam:  Awake, alert, lying in bed.    RLE                 LLE  Iliopsoas                         4/5                   3/5  Quadriceps                     4+/5                  4/5   Dorsiflexion                   0/5                    0/5  EHL                               0/5                    0/5  Plantarflexion                 0/5                   0/5      Posterior thoracic incision dressed with Prevena.     Recent Labs     05/20/23  0301 05/21/23  0206 05/22/23  0439   WBC 7.5 8.5 10.7   RBC 3.04* 3.33* 3.39*   HEMOGLOBIN 9.3* 10.2* 10.4*   HEMATOCRIT 29.2* 31.3* 31.9*   MCV 96.1 94.0 94.1   MCH 30.6 30.6 30.7   MCHC 31.8* 32.6* 32.6*   RDW 50.2* 48.4 48.0   PLATELETCT 148* 209 243   MPV 10.3 9.8 9.6       Recent Labs     05/20/23  0301 05/21/23  0206 05/22/23  0439   SODIUM 139 140 138   POTASSIUM 3.5* 3.6 3.7   CHLORIDE 102 102 102   CO2 31 28 26   GLUCOSE 112* 128* 113*   BUN 4* 6* 7*   CREATININE 0.61 0.70 0.59   CALCIUM 7.5* 8.0* 8.0*                     Assessment and Plan:  Hospital day # 7  POD# 6 s/p T11-T12 laminectomy, T11-L1 posterior instrumented fusion, ROBERTO C T12 level spinal cord injury.    - Neuro checks per unit policy  - Ok to normalize MAP goals.  - Remove current dressing, leave incision open to air.  - Ok to shower with incision uncovered, let soap and water wash over the incision, no rubbing or scrubbing, pat to dry, do not apply creams, ointments, or lotions to the incision area.  - Staples will be removed on 5/31.   - PT/OT- TLSO on when oob   - Ok to tx to rehab when medically cleared.  ATILIO trauma APRMARIAN Dai

## 2023-05-22 NOTE — CARE PLAN
The patient is Stable - Low risk of patient condition declining or worsening    Shift Goals  Clinical Goals: Monitor neuro status  Patient Goals: pain management,  Family Goals: shower    Progress made toward(s) clinical / shift goals:    Problem: Pain - Standard  Goal: Alleviation of pain or a reduction in pain to the patient’s comfort goal  Outcome: Progressing     Problem: Skin Integrity  Goal: Skin integrity is maintained or improved  Outcome: Progressing     Problem: Fall Risk  Goal: Patient will remain free from falls  Outcome: Progressing       Patient is not progressing towards the following goals:

## 2023-05-22 NOTE — THERAPY
Occupational Therapy  Daily Treatment     Patient Name: Bharath Diaz  Age:  42 y.o., Sex:  male  Medical Record #: [id number]  Today's Date: 5/22/2023       Precautions: Fall Risk, Spinal / Back Precautions , TLSO (Thoracolumbosacral orthosis)  Comments: TLSO EOB when OOB > 5 min    Assessment    Pt seen for OT tx. Continues to be limited by decreased activity tolerance, balance deficits and RUE pain impacting ability to complete ADLs and ADL transfers independently. Pt w/ limited ROM of RUE this session from previous d/t pain. Mod A supine > sit w/ HOB elevated. Prior to OOB activity pt required max A for pericare. Mod A to don gown d/t limited ROM in RUE. Max A to don TLSO. Encouraged pt to mobilize w/ nrsg staff as tolerated. Will continue to follow while in house.     Plan    Treatment Plan Status: Continue Current Treatment Plan    DC Equipment Recommendations: Unable to determine at this time  Discharge Recommendations: Recommend post-acute placement for additional occupational therapy services prior to discharge home       05/22/23 1011   Balance   Sitting Balance (Static) Fair -   Sitting Balance (Dynamic) Fair -   Weight Shift Sitting Fair   Bed Mobility    Supine to Sit Moderate Assist   Sit to Supine Moderate Assist   Activities of Daily Living   Grooming Supervision;Seated   Upper Body Dressing Moderate Assist   Lower Body Dressing Maximal Assist   Toileting Maximal Assist   Functional Mobility   Comments did not assess this session   Short Term Goals   Short Term Goal # 1 pt will demo ADL txfs with Ambar   Goal Outcome # 1 Goal not met   Short Term Goal # 2 pt will don/doff TLSO with supv   Goal Outcome # 2 Goal not met   Short Term Goal # 3 pt will dress LB with supv and AE prn   Goal Outcome # 3 Goal not met   Short Term Goal # 4 pt will demo toileting with supv   Goal Outcome # 4 Goal not met   Anticipated Discharge Equipment and Recommendations   DC Equipment Recommendations Unable to determine at  this time   Discharge Recommendations Recommend post-acute placement for additional occupational therapy services prior to discharge home
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
## 2023-05-22 NOTE — DISCHARGE PLANNING
Insurance remains pending.  Would appreciate updated [place] evals once appropriate.  RENATA James has medically cleared.     [id number]-Msg placed to Tanisha, spouse to discuss [facility] & D/C resources/support.

## 2023-05-22 NOTE — THERAPY
Physical Therapy   Daily Treatment     Patient Name: Bharath Diaz  Age:  42 y.o., Sex:  male  Medical Record #: [id number]  Today's Date: 5/22/2023     Precautions  Precautions: Fall Risk;TLSO (Thoracolumbosacral orthosis);Spinal / Back Precautions   Comments: TLSO on when OOB    Assessment    Pt seen for PT treatment with focus on sitting tolerance, balance and BLE exercises. Pt continues to require extensive assistance for all mobility due to pain, sensory impairments and weakness.   Pt educated on pacing of activity and quality of BLE exercises. Pt remains motivated to improve. Pt services to follow while in house.     Plan    Treatment Plan Status: (P) Continue Current Treatment Plan  Type of Treatment: Bed Mobility, Gait Training, Neuro Re-Education / Balance, Self Care / Home Evaluation, Stair Training, Therapeutic Activities, Therapeutic Exercise  Treatment Frequency: 5 Times per Week  Treatment Duration: Until Therapy Goals Met    DC Equipment Recommendations: (P) Unable to determine at this time  Discharge Recommendations: (P) Recommend post-acute placement for additional physical therapy services prior to discharge home        Precautions   Precautions Fall Risk;TLSO (Thoracolumbosacral orthosis);Spinal / Back Precautions    Comments TLSO on when OOB   Pain 0 - 10 Group   Location Shoulder;Back   Location Orientation Right   Therapist Pain Assessment Post Activity Pain Same as Prior to Activity;Nurse Notified;9   Cognition    Level of Consciousness Alert   Comments hyper verbal.   Active ROM Lower Body    Comments mild PF to approximation cues.   Strength Lower Body   Lower Body Strength  X   Comments glut, quad and HS activation. trace PF with approximation  and quick stretch.   Sensation Lower Body   Comments Impaired BLE knees distal   Supine Lower Body Exercise   Ankle Pumps Reciprocal  (AA reciprocal with joint approximation thru BLE's)   Gluteal Isometrics 1 set of 10;Bilateral   Quadriceps  Isometrics 1 set of 10;Bilateral   Comments cues to slow pace of exercises, cues for breathing, and holding contraction   Sitting Lower Body Exercises   Long Arc Quad 1 set of 10;Bilateral   Marching Reciprocal;1 set of 10   Other Exercises HS curls MRE   Neurological Concerns   Neurological Concerns Yes   Paresthesia Present   Footdrop Present   Balance   Sitting Balance (Static) Fair +   Sitting Balance (Dynamic) Fair -   Weight Shift Sitting Poor   Skilled Intervention Verbal Cuing;Tactile Cuing;Sequencing   Bed Mobility    Supine to Sit   (EOB pre session.)   Sit to Supine Moderate Assist   Rolling Moderate Assist to Lt.;Moderate Assist to Rt.   Skilled Intervention Verbal Cuing;Tactile Cuing;Sequencing   Comments limited by back pain and right shoulder pain   Gait Analysis   Gait Level Of Assist Unable to Participate   Functional Mobility   Sit to Stand Refused   Bed, Chair, Wheelchair Transfer Refused   Comments educated on benefit of increased time OOB, pt declined WC due to concern of having too much pain   How much difficulty does the patient currently have...   Turning over in bed (including adjusting bedclothes, sheets and blankets)? 1   Sitting down on and standing up from a chair with arms (e.g., wheelchair, bedside commode, etc.) 1   Moving from lying on back to sitting on the side of the bed? 1   How much help from another person does the patient currently need...   Moving to and from a bed to a chair (including a wheelchair)? 1   Need to walk in a hospital room? 1   Climbing 3-5 steps with a railing? 1   6 clicks Mobility Score 6   Activity Tolerance   Sitting Edge of Bed 20   Short Term Goals    Short Term Goal # 1 pt will perform supine <> sit with min A in 6 visits for improved independence   Goal Outcome # 1 goal not met   Short Term Goal # 2 pt will roll R/L with min A in 6 visits for improved independence   Goal Outcome # 2 Goal not met   Short Term Goal # 3 pt will perform STS with CGA in 6  visits for improved independence   Goal Outcome # 3 Goal not met   Short Term Goal # 4 pt will complete SPT to chair with min A in 6 visits for improved independence   Goal Outcome # 4 Goal not met   Short Term Goal # 5 pt will ambulate 50ft with FWW and min A in 6 visits to initiate gait   Goal Outcome # 5 Goal not met   Education Group   Spine Precautions Patient Response Patient;Acceptance;Reinforcement Needed   Role of Physical Therapist Patient Response Patient;Acceptance;Explanation;Reinforcement Needed   Exercises - Supine Patient Response Patient;Acceptance;Explanation;Demonstration;Reinforcement Needed   Exercises - Seated Patient Response Patient;Acceptance;Explanation;Demonstration;Reinforcement Needed   Physical Therapy Treatment Plan   Physical Therapy Treatment Plan Continue Current Treatment Plan   Anticipated Discharge Equipment and Recommendations   DC Equipment Recommendations Unable to determine at this time   Discharge Recommendations Recommend post-acute placement for additional physical therapy services prior to discharge home   Interdisciplinary Plan of Care Collaboration   IDT Collaboration with  Nursing   Patient Position at End of Therapy In Bed;Phone within Reach;Tray Table within Reach;Call Light within Reach;Bed Alarm On   Collaboration Comments staff updated.   Session Information   Date / Session Number  5/22-3 ( 3/5, 5/23)

## 2023-05-22 NOTE — ASSESSMENT & PLAN NOTE
Diagnostic x-ray with abnormally shaped osseous fragment projecting posterior to the medial aspect of the distal femur  5/21 Case discussed with Arnulfo Rosado, ortho PA-C  - No acute fracture, recommend MRI outpatient if pain persists
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
## 2023-05-23 ENCOUNTER — APPOINTMENT (OUTPATIENT)
Dept: RADIOLOGY | Facility: MEDICAL CENTER | Age: 43
DRG: 957 | End: 2023-05-23
Attending: SURGERY
Payer: COMMERCIAL

## 2023-05-23 ENCOUNTER — HOSPITAL ENCOUNTER (INPATIENT)
Facility: REHABILITATION | Age: 43
LOS: 16 days | DRG: 949 | End: 2023-06-08
Attending: PHYSICAL MEDICINE & REHABILITATION | Admitting: PHYSICAL MEDICINE & REHABILITATION
Payer: COMMERCIAL

## 2023-05-23 VITALS
HEART RATE: 86 BPM | OXYGEN SATURATION: 94 % | RESPIRATION RATE: 16 BRPM | BODY MASS INDEX: 35.5 KG/M2 | TEMPERATURE: 98.6 F | DIASTOLIC BLOOD PRESSURE: 63 MMHG | SYSTOLIC BLOOD PRESSURE: 116 MMHG | WEIGHT: 262.13 LBS | HEIGHT: 72 IN

## 2023-05-23 DIAGNOSIS — G82.22 INCOMPLETE PARAPLEGIA (HCC): ICD-10-CM

## 2023-05-23 DIAGNOSIS — M79.2 NEUROPATHIC PAIN: ICD-10-CM

## 2023-05-23 DIAGNOSIS — E78.5 HYPERLIPIDEMIA, UNSPECIFIED HYPERLIPIDEMIA TYPE: ICD-10-CM

## 2023-05-23 DIAGNOSIS — S22.081A T12 BURST FRACTURE (HCC): ICD-10-CM

## 2023-05-23 DIAGNOSIS — E55.9 VITAMIN D DEFICIENCY: ICD-10-CM

## 2023-05-23 DIAGNOSIS — K59.2 NEUROGENIC BOWEL: ICD-10-CM

## 2023-05-23 PROBLEM — G89.18 POSTOPERATIVE PAIN: Status: ACTIVE | Noted: 2023-05-23

## 2023-05-23 PROBLEM — M25.511 ACUTE PAIN OF RIGHT SHOULDER: Status: ACTIVE | Noted: 2023-05-23

## 2023-05-23 PROBLEM — F43.23 ADJUSTMENT DISORDER WITH MIXED ANXIETY AND DEPRESSED MOOD: Status: ACTIVE | Noted: 2023-05-23

## 2023-05-23 PROBLEM — Z02.9 DISCHARGE PLANNING ISSUES: Status: RESOLVED | Noted: 2023-05-17 | Resolved: 2023-05-23

## 2023-05-23 PROBLEM — J95.821 ACUTE RESPIRATORY FAILURE FOLLOWING TRAUMA AND SURGERY (HCC): Status: RESOLVED | Noted: 2023-05-16 | Resolved: 2023-05-23

## 2023-05-23 PROBLEM — G90.3 NEUROGENIC ORTHOSTATIC HYPOTENSION (HCC): Status: ACTIVE | Noted: 2023-05-23

## 2023-05-23 PROBLEM — Z75.8 DISCHARGE PLANNING ISSUES: Status: RESOLVED | Noted: 2023-05-17 | Resolved: 2023-05-23

## 2023-05-23 PROBLEM — R25.2 SPASTICITY: Status: ACTIVE | Noted: 2023-05-23

## 2023-05-23 PROBLEM — E83.39 HYPOPHOSPHATEMIA: Status: RESOLVED | Noted: 2023-05-18 | Resolved: 2023-05-23

## 2023-05-23 PROBLEM — F10.929 ACUTE ALCOHOL INTOXICATION (HCC): Status: RESOLVED | Noted: 2023-05-16 | Resolved: 2023-05-23

## 2023-05-23 LAB
ANION GAP SERPL CALC-SCNC: 9 MMOL/L (ref 7–16)
BASOPHILS # BLD AUTO: 0.5 % (ref 0–1.8)
BASOPHILS # BLD: 0.05 K/UL (ref 0–0.12)
BUN SERPL-MCNC: 10 MG/DL (ref 8–22)
CALCIUM SERPL-MCNC: 8.4 MG/DL (ref 8.5–10.5)
CHLORIDE SERPL-SCNC: 100 MMOL/L (ref 96–112)
CO2 SERPL-SCNC: 28 MMOL/L (ref 20–33)
CREAT SERPL-MCNC: 0.76 MG/DL (ref 0.5–1.4)
EOSINOPHIL # BLD AUTO: 0.11 K/UL (ref 0–0.51)
EOSINOPHIL NFR BLD: 1.1 % (ref 0–6.9)
ERYTHROCYTE [DISTWIDTH] IN BLOOD BY AUTOMATED COUNT: 48.5 FL (ref 35.9–50)
FLUAV RNA SPEC QL NAA+PROBE: NEGATIVE
FLUBV RNA SPEC QL NAA+PROBE: NEGATIVE
GFR SERPLBLD CREATININE-BSD FMLA CKD-EPI: 114 ML/MIN/1.73 M 2
GLUCOSE SERPL-MCNC: 117 MG/DL (ref 65–99)
HCT VFR BLD AUTO: 32.9 % (ref 42–52)
HGB BLD-MCNC: 10.8 G/DL (ref 14–18)
IMM GRANULOCYTES # BLD AUTO: 0.26 K/UL (ref 0–0.11)
IMM GRANULOCYTES NFR BLD AUTO: 2.6 % (ref 0–0.9)
LYMPHOCYTES # BLD AUTO: 1.54 K/UL (ref 1–4.8)
LYMPHOCYTES NFR BLD: 15.7 % (ref 22–41)
MCH RBC QN AUTO: 30.7 PG (ref 27–33)
MCHC RBC AUTO-ENTMCNC: 32.8 G/DL (ref 32.3–36.5)
MCV RBC AUTO: 93.5 FL (ref 81.4–97.8)
MONOCYTES # BLD AUTO: 1.04 K/UL (ref 0–0.85)
MONOCYTES NFR BLD AUTO: 10.6 % (ref 0–13.4)
NEUTROPHILS # BLD AUTO: 6.83 K/UL (ref 1.82–7.42)
NEUTROPHILS NFR BLD: 69.5 % (ref 44–72)
NRBC # BLD AUTO: 0.03 K/UL
NRBC BLD-RTO: 0.3 /100 WBC (ref 0–0.2)
PLATELET # BLD AUTO: 260 K/UL (ref 164–446)
PMV BLD AUTO: 9.6 FL (ref 9–12.9)
POTASSIUM SERPL-SCNC: 3.9 MMOL/L (ref 3.6–5.5)
RBC # BLD AUTO: 3.52 M/UL (ref 4.7–6.1)
RSV RNA SPEC QL NAA+PROBE: NEGATIVE
SARS-COV-2 RNA RESP QL NAA+PROBE: NOTDETECTED
SODIUM SERPL-SCNC: 137 MMOL/L (ref 135–145)
SPECIMEN SOURCE: NORMAL
WBC # BLD AUTO: 9.8 K/UL (ref 4.8–10.8)

## 2023-05-23 PROCEDURE — 700111 HCHG RX REV CODE 636 W/ 250 OVERRIDE (IP)

## 2023-05-23 PROCEDURE — 85025 COMPLETE CBC W/AUTO DIFF WBC: CPT

## 2023-05-23 PROCEDURE — A9270 NON-COVERED ITEM OR SERVICE: HCPCS | Performed by: PHYSICAL MEDICINE & REHABILITATION

## 2023-05-23 PROCEDURE — 700101 HCHG RX REV CODE 250: Performed by: SURGERY

## 2023-05-23 PROCEDURE — A9270 NON-COVERED ITEM OR SERVICE: HCPCS | Performed by: SURGERY

## 2023-05-23 PROCEDURE — 36415 COLL VENOUS BLD VENIPUNCTURE: CPT

## 2023-05-23 PROCEDURE — 71045 X-RAY EXAM CHEST 1 VIEW: CPT

## 2023-05-23 PROCEDURE — 770010 HCHG ROOM/CARE - REHAB SEMI PRIVAT*

## 2023-05-23 PROCEDURE — 700102 HCHG RX REV CODE 250 W/ 637 OVERRIDE(OP): Performed by: SURGERY

## 2023-05-23 PROCEDURE — 700102 HCHG RX REV CODE 250 W/ 637 OVERRIDE(OP): Performed by: NURSE PRACTITIONER

## 2023-05-23 PROCEDURE — 99239 HOSP IP/OBS DSCHRG MGMT >30: CPT | Performed by: NURSE PRACTITIONER

## 2023-05-23 PROCEDURE — 0241U HCHG SARS-COV-2 COVID-19 NFCT DS RESP RNA 4 TRGT MIC: CPT

## 2023-05-23 PROCEDURE — 700102 HCHG RX REV CODE 250 W/ 637 OVERRIDE(OP): Performed by: PHYSICAL MEDICINE & REHABILITATION

## 2023-05-23 PROCEDURE — 99223 1ST HOSP IP/OBS HIGH 75: CPT | Performed by: PHYSICAL MEDICINE & REHABILITATION

## 2023-05-23 PROCEDURE — 700111 HCHG RX REV CODE 636 W/ 250 OVERRIDE (IP): Performed by: PHYSICAL MEDICINE & REHABILITATION

## 2023-05-23 PROCEDURE — A9270 NON-COVERED ITEM OR SERVICE: HCPCS | Performed by: NURSE PRACTITIONER

## 2023-05-23 PROCEDURE — 80048 BASIC METABOLIC PNL TOTAL CA: CPT

## 2023-05-23 RX ORDER — TRAZODONE HYDROCHLORIDE 50 MG/1
50 TABLET ORAL
Status: DISCONTINUED | OUTPATIENT
Start: 2023-05-23 | End: 2023-06-08 | Stop reason: HOSPADM

## 2023-05-23 RX ORDER — ACETAMINOPHEN 325 MG/1
650 TABLET ORAL EVERY 4 HOURS PRN
Status: DISCONTINUED | OUTPATIENT
Start: 2023-05-23 | End: 2023-06-08 | Stop reason: HOSPADM

## 2023-05-23 RX ORDER — CYCLOBENZAPRINE HCL 10 MG
10 TABLET ORAL 3 TIMES DAILY
Status: DISCONTINUED | OUTPATIENT
Start: 2023-05-23 | End: 2023-05-30

## 2023-05-23 RX ORDER — DIAZEPAM 5 MG/1
5 TABLET ORAL EVERY 6 HOURS PRN
Status: CANCELLED | OUTPATIENT
Start: 2023-05-23

## 2023-05-23 RX ORDER — LIDOCAINE 50 MG/G
1 PATCH TOPICAL EVERY 24 HOURS
Qty: 10 PATCH | Status: ON HOLD
Start: 2023-05-24 | End: 2023-06-07

## 2023-05-23 RX ORDER — MORPHINE SULFATE 15 MG/1
15 TABLET, FILM COATED, EXTENDED RELEASE ORAL EVERY 12 HOURS
Status: CANCELLED | OUTPATIENT
Start: 2023-05-23

## 2023-05-23 RX ORDER — ONDANSETRON 4 MG/1
4 TABLET, ORALLY DISINTEGRATING ORAL EVERY 4 HOURS PRN
Qty: 10 TABLET | Refills: 0 | Status: ON HOLD
Start: 2023-05-23 | End: 2023-06-07

## 2023-05-23 RX ORDER — BISACODYL 10 MG/1
10 SUPPOSITORY RECTAL
Status: DISCONTINUED | OUTPATIENT
Start: 2023-05-23 | End: 2023-05-29

## 2023-05-23 RX ORDER — ALUMINA, MAGNESIA, AND SIMETHICONE 2400; 2400; 240 MG/30ML; MG/30ML; MG/30ML
20 SUSPENSION ORAL
Status: DISCONTINUED | OUTPATIENT
Start: 2023-05-23 | End: 2023-06-08 | Stop reason: HOSPADM

## 2023-05-23 RX ORDER — M-VIT,TX,IRON,MINS/CALC/FOLIC 27MG-0.4MG
1 TABLET ORAL
Status: DISCONTINUED | OUTPATIENT
Start: 2023-05-23 | End: 2023-06-08 | Stop reason: HOSPADM

## 2023-05-23 RX ORDER — MIDODRINE HYDROCHLORIDE 2.5 MG/1
5 TABLET ORAL
Status: DISCONTINUED | OUTPATIENT
Start: 2023-05-23 | End: 2023-05-25

## 2023-05-23 RX ORDER — SENNOSIDES A AND B 8.6 MG/1
17.2 TABLET, FILM COATED ORAL
Status: DISCONTINUED | OUTPATIENT
Start: 2023-05-23 | End: 2023-05-29

## 2023-05-23 RX ORDER — OMEPRAZOLE 20 MG/1
20 CAPSULE, DELAYED RELEASE ORAL DAILY
Status: DISCONTINUED | OUTPATIENT
Start: 2023-05-23 | End: 2023-06-08 | Stop reason: HOSPADM

## 2023-05-23 RX ORDER — GABAPENTIN 100 MG/1
200 CAPSULE ORAL 3 TIMES DAILY
Status: DISCONTINUED | OUTPATIENT
Start: 2023-05-23 | End: 2023-05-23

## 2023-05-23 RX ORDER — ACETAMINOPHEN 500 MG
1000 TABLET ORAL EVERY 6 HOURS PRN
Qty: 30 TABLET | Refills: 0 | Status: ON HOLD
Start: 2023-05-23 | End: 2023-06-07

## 2023-05-23 RX ORDER — ECHINACEA PURPUREA EXTRACT 125 MG
2 TABLET ORAL PRN
Status: DISCONTINUED | OUTPATIENT
Start: 2023-05-23 | End: 2023-06-08 | Stop reason: HOSPADM

## 2023-05-23 RX ORDER — ENOXAPARIN SODIUM 100 MG/ML
30 INJECTION SUBCUTANEOUS EVERY 12 HOURS
Status: ON HOLD | DISCHARGE
Start: 2023-05-23 | End: 2023-06-07

## 2023-05-23 RX ORDER — MORPHINE SULFATE 15 MG/1
15 TABLET, FILM COATED, EXTENDED RELEASE ORAL EVERY 12 HOURS
Status: DISCONTINUED | OUTPATIENT
Start: 2023-05-23 | End: 2023-05-26

## 2023-05-23 RX ORDER — HYDRALAZINE HYDROCHLORIDE 25 MG/1
25 TABLET, FILM COATED ORAL EVERY 8 HOURS PRN
Status: DISCONTINUED | OUTPATIENT
Start: 2023-05-23 | End: 2023-06-08 | Stop reason: HOSPADM

## 2023-05-23 RX ORDER — LIDOCAINE 50 MG/G
1 PATCH TOPICAL EVERY 24 HOURS
Status: DISCONTINUED | OUTPATIENT
Start: 2023-05-24 | End: 2023-05-26

## 2023-05-23 RX ORDER — GABAPENTIN 100 MG/1
200 CAPSULE ORAL 3 TIMES DAILY
Qty: 90 CAPSULE | Status: ON HOLD
Start: 2023-05-23 | End: 2023-06-07

## 2023-05-23 RX ORDER — GABAPENTIN 100 MG/1
200 CAPSULE ORAL 3 TIMES DAILY
Status: CANCELLED | OUTPATIENT
Start: 2023-05-23

## 2023-05-23 RX ORDER — ACETAMINOPHEN 500 MG
1000 TABLET ORAL 3 TIMES DAILY
Status: COMPLETED | OUTPATIENT
Start: 2023-05-23 | End: 2023-05-28

## 2023-05-23 RX ORDER — ENOXAPARIN SODIUM 100 MG/ML
40 INJECTION SUBCUTANEOUS
Status: DISCONTINUED | OUTPATIENT
Start: 2023-05-23 | End: 2023-06-08 | Stop reason: HOSPADM

## 2023-05-23 RX ORDER — PSEUDOEPHEDRINE HCL 30 MG
100 TABLET ORAL 2 TIMES DAILY
Qty: 60 CAPSULE | Status: ON HOLD
Start: 2023-05-23 | End: 2023-06-07

## 2023-05-23 RX ORDER — BISACODYL 10 MG
10 SUPPOSITORY, RECTAL RECTAL
Refills: 0 | Status: ON HOLD
Start: 2023-05-23 | End: 2023-06-07

## 2023-05-23 RX ORDER — BISACODYL 10 MG
10 SUPPOSITORY, RECTAL RECTAL EVERY MORNING
Refills: 0 | Status: ON HOLD
Start: 2023-05-24 | End: 2023-06-07

## 2023-05-23 RX ORDER — OXYCODONE HYDROCHLORIDE 10 MG/1
10 TABLET ORAL
Status: CANCELLED | OUTPATIENT
Start: 2023-05-23

## 2023-05-23 RX ORDER — MIDODRINE HYDROCHLORIDE 5 MG/1
5 TABLET ORAL
Qty: 60 TABLET | Status: ON HOLD
Start: 2023-05-23 | End: 2023-06-07

## 2023-05-23 RX ORDER — OXYCODONE HYDROCHLORIDE 5 MG/1
5 TABLET ORAL
Status: DISCONTINUED | OUTPATIENT
Start: 2023-05-23 | End: 2023-06-06

## 2023-05-23 RX ORDER — AMOXICILLIN 250 MG
1 CAPSULE ORAL NIGHTLY
Qty: 30 TABLET | Refills: 0 | Status: ON HOLD
Start: 2023-05-23 | End: 2023-06-07

## 2023-05-23 RX ORDER — M-VIT,TX,IRON,MINS/CALC/FOLIC 27MG-0.4MG
1 TABLET ORAL
Status: DISCONTINUED | OUTPATIENT
Start: 2023-05-24 | End: 2023-05-23

## 2023-05-23 RX ORDER — CARBOXYMETHYLCELLULOSE SODIUM 5 MG/ML
1 SOLUTION/ DROPS OPHTHALMIC PRN
Status: DISCONTINUED | OUTPATIENT
Start: 2023-05-23 | End: 2023-06-08 | Stop reason: HOSPADM

## 2023-05-23 RX ORDER — MORPHINE SULFATE 15 MG/1
15 TABLET, FILM COATED, EXTENDED RELEASE ORAL EVERY 12 HOURS
Qty: 14 TABLET | Refills: 0 | Status: ON HOLD
Start: 2023-05-23 | End: 2023-06-07

## 2023-05-23 RX ORDER — OXYCODONE HYDROCHLORIDE 5 MG/1
5 TABLET ORAL
Qty: 30 TABLET | Refills: 0 | Status: ON HOLD
Start: 2023-05-23 | End: 2023-06-07

## 2023-05-23 RX ORDER — DIAZEPAM 5 MG/1
5 TABLET ORAL EVERY 6 HOURS
Status: ON HOLD
Start: 2023-05-23 | End: 2023-06-07

## 2023-05-23 RX ORDER — DIAZEPAM 5 MG/1
5 TABLET ORAL EVERY 6 HOURS PRN
Status: DISCONTINUED | OUTPATIENT
Start: 2023-05-23 | End: 2023-05-23

## 2023-05-23 RX ORDER — GABAPENTIN 300 MG/1
300 CAPSULE ORAL 3 TIMES DAILY
Status: DISCONTINUED | OUTPATIENT
Start: 2023-05-23 | End: 2023-05-24

## 2023-05-23 RX ORDER — DOCUSATE SODIUM 100 MG/1
200 CAPSULE, LIQUID FILLED ORAL 2 TIMES DAILY
Status: DISCONTINUED | OUTPATIENT
Start: 2023-05-23 | End: 2023-05-29

## 2023-05-23 RX ORDER — ACETAMINOPHEN 500 MG
1000 TABLET ORAL EVERY 6 HOURS PRN
Status: DISCONTINUED | OUTPATIENT
Start: 2023-05-28 | End: 2023-06-08 | Stop reason: HOSPADM

## 2023-05-23 RX ORDER — OXYCODONE HYDROCHLORIDE 5 MG/1
5 TABLET ORAL
Status: CANCELLED | OUTPATIENT
Start: 2023-05-23

## 2023-05-23 RX ORDER — LIDOCAINE 50 MG/G
1 PATCH TOPICAL EVERY 24 HOURS
Status: CANCELLED | OUTPATIENT
Start: 2023-05-24

## 2023-05-23 RX ORDER — ONDANSETRON 4 MG/1
4 TABLET, ORALLY DISINTEGRATING ORAL 4 TIMES DAILY PRN
Status: DISCONTINUED | OUTPATIENT
Start: 2023-05-23 | End: 2023-06-08 | Stop reason: HOSPADM

## 2023-05-23 RX ORDER — OXYCODONE HYDROCHLORIDE 10 MG/1
10 TABLET ORAL
Status: DISCONTINUED | OUTPATIENT
Start: 2023-05-23 | End: 2023-06-06

## 2023-05-23 RX ORDER — ONDANSETRON 2 MG/ML
4 INJECTION INTRAMUSCULAR; INTRAVENOUS 4 TIMES DAILY PRN
Status: DISCONTINUED | OUTPATIENT
Start: 2023-05-23 | End: 2023-06-08 | Stop reason: HOSPADM

## 2023-05-23 RX ORDER — POLYETHYLENE GLYCOL 3350 17 G/17G
17 POWDER, FOR SOLUTION ORAL 2 TIMES DAILY
Refills: 3 | Status: ON HOLD
Start: 2023-05-23 | End: 2023-06-07

## 2023-05-23 RX ORDER — MIDODRINE HYDROCHLORIDE 5 MG/1
5 TABLET ORAL
Status: CANCELLED | OUTPATIENT
Start: 2023-05-23

## 2023-05-23 RX ADMIN — ACETAMINOPHEN 1000 MG: 500 TABLET ORAL at 15:20

## 2023-05-23 RX ADMIN — GABAPENTIN 200 MG: 100 CAPSULE ORAL at 04:47

## 2023-05-23 RX ADMIN — DOCUSATE SODIUM 200 MG: 100 CAPSULE, LIQUID FILLED ORAL at 21:23

## 2023-05-23 RX ADMIN — TRAZODONE HYDROCHLORIDE 50 MG: 50 TABLET ORAL at 21:25

## 2023-05-23 RX ADMIN — GABAPENTIN 200 MG: 100 CAPSULE ORAL at 11:55

## 2023-05-23 RX ADMIN — LIDOCAINE 1 PATCH: 50 PATCH TOPICAL at 04:45

## 2023-05-23 RX ADMIN — OXYCODONE HYDROCHLORIDE 10 MG: 10 TABLET ORAL at 15:19

## 2023-05-23 RX ADMIN — MIDODRINE HYDROCHLORIDE 5 MG: 2.5 TABLET ORAL at 18:31

## 2023-05-23 RX ADMIN — OXYCODONE HYDROCHLORIDE 10 MG: 10 TABLET ORAL at 22:19

## 2023-05-23 RX ADMIN — OXYCODONE HYDROCHLORIDE 10 MG: 10 TABLET ORAL at 04:47

## 2023-05-23 RX ADMIN — ACETAMINOPHEN 1000 MG: 500 TABLET ORAL at 21:23

## 2023-05-23 RX ADMIN — DIAZEPAM 5 MG: 5 TABLET ORAL at 04:47

## 2023-05-23 RX ADMIN — MORPHINE SULFATE 15 MG: 15 TABLET, FILM COATED, EXTENDED RELEASE ORAL at 21:23

## 2023-05-23 RX ADMIN — SENNOSIDES 17.2 MG: 8.6 TABLET, FILM COATED ORAL at 15:20

## 2023-05-23 RX ADMIN — CYCLOBENZAPRINE 10 MG: 10 TABLET, FILM COATED ORAL at 18:31

## 2023-05-23 RX ADMIN — MORPHINE SULFATE 15 MG: 15 TABLET, FILM COATED, EXTENDED RELEASE ORAL at 04:47

## 2023-05-23 RX ADMIN — MAGNESIUM HYDROXIDE 30 ML: 400 SUSPENSION ORAL at 04:46

## 2023-05-23 RX ADMIN — OXYCODONE HYDROCHLORIDE 10 MG: 10 TABLET ORAL at 11:55

## 2023-05-23 RX ADMIN — BISACODYL 10 MG: 10 SUPPOSITORY RECTAL at 21:22

## 2023-05-23 RX ADMIN — GABAPENTIN 300 MG: 300 CAPSULE ORAL at 21:23

## 2023-05-23 RX ADMIN — OMEPRAZOLE 20 MG: 20 CAPSULE, DELAYED RELEASE ORAL at 12:45

## 2023-05-23 RX ADMIN — ENOXAPARIN SODIUM 30 MG: 100 INJECTION SUBCUTANEOUS at 04:46

## 2023-05-23 RX ADMIN — DOCUSATE SODIUM 100 MG: 100 CAPSULE, LIQUID FILLED ORAL at 04:48

## 2023-05-23 RX ADMIN — CYCLOBENZAPRINE 10 MG: 10 TABLET, FILM COATED ORAL at 21:25

## 2023-05-23 RX ADMIN — OXYCODONE HYDROCHLORIDE 10 MG: 10 TABLET ORAL at 18:28

## 2023-05-23 RX ADMIN — ENOXAPARIN SODIUM 40 MG: 100 INJECTION SUBCUTANEOUS at 21:23

## 2023-05-23 RX ADMIN — MIDODRINE HYDROCHLORIDE 5 MG: 5 TABLET ORAL at 11:55

## 2023-05-23 RX ADMIN — MIDODRINE HYDROCHLORIDE 5 MG: 5 TABLET ORAL at 08:26

## 2023-05-23 RX ADMIN — OXYCODONE HYDROCHLORIDE 10 MG: 10 TABLET ORAL at 01:04

## 2023-05-23 ASSESSMENT — LIFESTYLE VARIABLES
HAVE YOU EVER FELT YOU SHOULD CUT DOWN ON YOUR DRINKING: NO
EVER HAD A DRINK FIRST THING IN THE MORNING TO STEADY YOUR NERVES TO GET RID OF A HANGOVER: NO
TOTAL SCORE: 0
ON A TYPICAL DAY WHEN YOU DRINK ALCOHOL HOW MANY DRINKS DO YOU HAVE: 3
EVER FELT BAD OR GUILTY ABOUT YOUR DRINKING: NO
AVERAGE NUMBER OF DAYS PER WEEK YOU HAVE A DRINK CONTAINING ALCOHOL: 2
ALCOHOL_USE: YES
CONSUMPTION TOTAL: NEGATIVE
TOTAL SCORE: 0
HAVE PEOPLE ANNOYED YOU BY CRITICIZING YOUR DRINKING: NO
HOW MANY TIMES IN THE PAST YEAR HAVE YOU HAD 5 OR MORE DRINKS IN A DAY: 0
TOTAL SCORE: 0

## 2023-05-23 ASSESSMENT — PATIENT HEALTH QUESTIONNAIRE - PHQ9
2. FEELING DOWN, DEPRESSED, IRRITABLE, OR HOPELESS: NOT AT ALL
1. LITTLE INTEREST OR PLEASURE IN DOING THINGS: NOT AT ALL
SUM OF ALL RESPONSES TO PHQ9 QUESTIONS 1 AND 2: 0
1. LITTLE INTEREST OR PLEASURE IN DOING THINGS: NOT AT ALL
2. FEELING DOWN, DEPRESSED, IRRITABLE, OR HOPELESS: NOT AT ALL
SUM OF ALL RESPONSES TO PHQ9 QUESTIONS 1 AND 2: 0

## 2023-05-23 ASSESSMENT — PAIN DESCRIPTION - PAIN TYPE
TYPE: ACUTE PAIN

## 2023-05-23 ASSESSMENT — FIBROSIS 4 INDEX: FIB4 SCORE: 1.43

## 2023-05-23 NOTE — DISCHARGE PLANNING
Insurance has authorized.  RENATA James has medically cleared.  Spoke with Bharath regarding the TX regimen @ [facility].  He is agreeable and is aware that IV analgesics are not an option.  Plan is to go to [place] with his Mother, Sister & 2 Nieces in a 1LV home with no steps to enter.  Plan is for his Sister to drive to [place] and attend family training for 1-2 days prior to D/C.  Will discuss this case further with Administration.     0937-Case is under review by Dr. Sahni.     1047-Dr. Sahni has accepted.  Transport has been arranged via T between [id number].  Msg placed to RENATA James, Georgie MORRIS & Maddie BSN.

## 2023-05-23 NOTE — CARE PLAN
The patient is Stable - Low risk of patient condition declining or worsening    Shift Goals  Clinical Goals: Pain Control; Neuro Status; Mobilization  Patient Goals: Pain Control  Family Goals: MIGUEL    Progress made toward(s) clinical / shift goals:  Assumed care of pt at 1845. POC discussed with pt. Pt A/Ox 4 and verbalized understanding. Pt on fall precautions. Pt on Q4hr neuro checks. Pt educated to use the call light for assistance. Call light within reach. Pt rounded on frequently throughout the shift.      Problem: Pain - Standard  Goal: Alleviation of pain or a reduction in pain to the patient’s comfort goal  Outcome: Progressing  Note: Pt's pain assessed throughout shift. Patient offered pharmacological and non-pharmacological interventions.      Problem: Skin Integrity  Goal: Skin integrity is maintained or improved  Outcome: Progressing  Note:  Pt turned and repositioned Q2H or as requested. Barrier cream and mepilexes used to prevent skin breakdown on delicate perineal areas and bony prominences. Linen changes provided as needed to avoid risk of developing pressure ulcers.      Problem: Fall Risk  Goal: Patient will remain free from falls  Outcome: Progressing  Note: Fall precautions in place. Bed locked and in lowest position, bed alarm in place, treaded socks used when ambulated, call light within reach. Pt educated to call for assistance. Pt rounded on frequently throughout shift.         Patient is not progressing towards the following goals:
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
## 2023-05-23 NOTE — CARE PLAN
Problem: Pain - Standard  Goal: Alleviation of pain or a reduction in pain to the patient’s comfort goal  Description: Target End Date:  Prior to discharge or change in level of care    Document on Vitals flowsheet    1.  Document pain using the appropriate pain scale per order or unit policy  2.  Educate and implement non-pharmacologic comfort measures (i.e. relaxation, distraction, massage, cold/heat therapy, etc.)  3.  Pain management medications as ordered  4.  Reassess pain after pain med administration per policy  5.  If opiods administered assess patient's response to pain medication is appropriate per POSS sedation scale  6.  Follow pain management plan developed in collaboration with patient and interdisciplinary team (including palliative care or pain specialists if applicable)  Outcome: Progressing   The patient is Watcher - Medium risk of patient condition declining or worsening    Shift Goals  Clinical Goals: pain control  Patient Goals: pain control  Family Goals: shower    Progress made toward(s) clinical / shift goals:      Patient is not progressing towards the following goals:

## 2023-05-23 NOTE — ASSESSMENT & PLAN NOTE
5/19 2 view shoulder x-ray without acute fracture  Case discussed with orthopedics, nonoperative management  Recommend outpatient follow-up if pain persists

## 2023-05-23 NOTE — DISCHARGE PLANNING
Actual Discharge Information     Discharge Disposition:Disch to IP rehab facility or distinct part unit (62)    Patient accepted at [facility]    GMT transport at 1200 -1230 via .     No other CM needs at this time.

## 2023-05-23 NOTE — DISCHARGE INSTRUCTIONS
Discharge Instructions:    ACTIVITIES:   As tolerated    DRIVING:   You may drive whenever you are off pain medications and are able to perform the activities needed to drive, i.e. turning, bending, twisting, wearing a seat belt, etc.    BATHING:   You may get the wound wet at any time after leaving the hospital. You may shower, but do not submerge in a bath or a pool until you after your first postoperative visit.    WOUND CARE:   Staples to back open to air     BOWEL FUNCTION:  Prescription pain medication may cause constipation. If you are having problems, use what you normally would or call your provider for suggestions. It also helps to stay regular by including fiber in your diet (for example: bran or fruits and vegetables) and drink plenty of liquids (water, juice, etc.).      Enoxaparin injection  What is this medicine?  ENOXAPARIN (ee nox a PA rin) is used after knee, hip, or abdominal surgeries to prevent blood clotting. It is also used to treat existing blood clots in the lungs or in the veins.  This medicine may be used for other purposes; ask your health care provider or pharmacist if you have questions.  COMMON BRAND NAME(S): Lovenox  What should I tell my health care provider before I take this medicine?  They need to know if you have any of these conditions:  bleeding disorders, hemorrhage, or hemophilia  infection of the heart or heart valves  kidney or liver disease  previous stroke  prosthetic heart valve  recent surgery or delivery of a baby  ulcer in the stomach or intestine, diverticulitis, or other bowel disease  an unusual or allergic reaction to enoxaparin, heparin, pork or pork products, other medicines, foods, dyes, or preservatives  pregnant or trying to get pregnant  breast-feeding  How should I use this medicine?  This medicine is for injection under the skin. It is usually given by a health-care professional. You or a family member may be trained on how to give the injections. If  you are to give yourself injections, make sure you understand how to use the syringe, measure the dose if necessary, and give the injection. To avoid bruising, do not rub the site where this medicine has been injected. Do not take your medicine more often than directed. Do not stop taking except on the advice of your doctor or health care professional.  Make sure you receive a puncture-resistant container to dispose of the needles and syringes once you have finished with them. Do not reuse these items. Return the container to your doctor or health care professional for proper disposal.  Talk to your pediatrician regarding the use of this medicine in children. Special care may be needed.  Overdosage: If you think you have taken too much of this medicine contact a poison control center or emergency room at once.  NOTE: This medicine is only for you. Do not share this medicine with others.  What if I miss a dose?  If you miss a dose, take it as soon as you can. If it is almost time for your next dose, take only that dose. Do not take double or extra doses.  What may interact with this medicine?  aspirin and aspirin-like medicines  certain medicines that treat or prevent blood clots  dipyridamole  NSAIDs, medicines for pain and inflammation, like ibuprofen or naproxen  This list may not describe all possible interactions. Give your health care provider a list of all the medicines, herbs, non-prescription drugs, or dietary supplements you use. Also tell them if you smoke, drink alcohol, or use illegal drugs. Some items may interact with your medicine.  What should I watch for while using this medicine?  Visit your healthcare professional for regular checks on your progress. You may need blood work done while you are taking this medicine. Your condition will be monitored carefully while you are receiving this medicine. It is important not to miss any appointments.  If you are going to need surgery or other procedure, tell  your healthcare professional that you are using this medicine.  Using this medicine for a long time may weaken your bones and increase the risk of bone fractures.  Avoid sports and activities that might cause injury while you are using this medicine. Severe falls or injuries can cause unseen bleeding. Be careful when using sharp tools or knives. Consider using an electric razor. Take special care brushing or flossing your teeth. Report any injuries, bruising, or red spots on the skin to your healthcare professional.  Wear a medical ID bracelet or chain. Carry a card that describes your disease and details of your medicine and dosage times.  What side effects may I notice from receiving this medicine?  Side effects that you should report to your doctor or health care professional as soon as possible:  allergic reactions like skin rash, itching or hives, swelling of the face, lips, or tongue  bone pain  signs and symptoms of bleeding such as bloody or black, tarry stools; red or dark-brown urine; spitting up blood or brown material that looks like coffee grounds; red spots on the skin; unusual bruising or bleeding from the eye, gums, or nose  signs and symptoms of a blood clot such as chest pain; shortness of breath; pain, swelling, or warmth in the leg  signs and symptoms of a stroke such as changes in vision; confusion; trouble speaking or understanding; severe headaches; sudden numbness or weakness of the face, arm or leg; trouble walking; dizziness; loss of coordination  Side effects that usually do not require medical attention (report to your doctor or health care professional if they continue or are bothersome):  hair loss  pain, redness, or irritation at site where injected  This list may not describe all possible side effects. Call your doctor for medical advice about side effects. You may report side effects to FDA at 0-438-FDA-6187.  Where should I keep my medicine?  Keep out of the reach of  children.  Store at room temperature between 15 and 30 degrees C (59 and 86 degrees F). Do not freeze. If your injections have been specially prepared, you may need to store them in the refrigerator. Ask your pharmacist. Throw away any unused medicine after the expiration date.  NOTE: This sheet is a summary. It may not cover all possible information. If you have questions about this medicine, talk to your doctor, pharmacist, or health care provider.  © 2020 [employer]/Gold Standard (2018-12-13 11:25:34)

## 2023-05-24 ENCOUNTER — APPOINTMENT (OUTPATIENT)
Dept: OCCUPATIONAL THERAPY | Facility: REHABILITATION | Age: 43
End: 2023-05-24
Attending: PHYSICAL MEDICINE & REHABILITATION
Payer: COMMERCIAL

## 2023-05-24 ENCOUNTER — APPOINTMENT (OUTPATIENT)
Dept: PHYSICAL THERAPY | Facility: REHABILITATION | Age: 43
DRG: 949 | End: 2023-05-24
Attending: PHYSICAL MEDICINE & REHABILITATION
Payer: COMMERCIAL

## 2023-05-24 ENCOUNTER — APPOINTMENT (OUTPATIENT)
Dept: RADIOLOGY | Facility: REHABILITATION | Age: 43
DRG: 949 | End: 2023-05-24
Attending: PHYSICAL MEDICINE & REHABILITATION
Payer: COMMERCIAL

## 2023-05-24 LAB
25(OH)D3 SERPL-MCNC: 17 NG/ML (ref 30–100)
ALBUMIN SERPL BCP-MCNC: 3.2 G/DL (ref 3.2–4.9)
ALBUMIN/GLOB SERPL: 1 G/DL
ALP SERPL-CCNC: 63 U/L (ref 30–99)
ALT SERPL-CCNC: 79 U/L (ref 2–50)
ANION GAP SERPL CALC-SCNC: 11 MMOL/L (ref 7–16)
AST SERPL-CCNC: 51 U/L (ref 12–45)
BASOPHILS # BLD AUTO: 0.4 % (ref 0–1.8)
BASOPHILS # BLD: 0.03 K/UL (ref 0–0.12)
BILIRUB SERPL-MCNC: 0.9 MG/DL (ref 0.1–1.5)
BUN SERPL-MCNC: 13 MG/DL (ref 8–22)
CALCIUM ALBUM COR SERPL-MCNC: 9.2 MG/DL (ref 8.5–10.5)
CALCIUM SERPL-MCNC: 8.6 MG/DL (ref 8.5–10.5)
CHLORIDE SERPL-SCNC: 102 MMOL/L (ref 96–112)
CHOLEST SERPL-MCNC: 206 MG/DL (ref 100–199)
CO2 SERPL-SCNC: 25 MMOL/L (ref 20–33)
CREAT SERPL-MCNC: 0.7 MG/DL (ref 0.5–1.4)
EOSINOPHIL # BLD AUTO: 0.15 K/UL (ref 0–0.51)
EOSINOPHIL NFR BLD: 1.8 % (ref 0–6.9)
ERYTHROCYTE [DISTWIDTH] IN BLOOD BY AUTOMATED COUNT: 49.1 FL (ref 35.9–50)
EST. AVERAGE GLUCOSE BLD GHB EST-MCNC: 111 MG/DL
GFR SERPLBLD CREATININE-BSD FMLA CKD-EPI: 117 ML/MIN/1.73 M 2
GLOBULIN SER CALC-MCNC: 3.2 G/DL (ref 1.9–3.5)
GLUCOSE SERPL-MCNC: 113 MG/DL (ref 65–99)
HBA1C MFR BLD: 5.5 % (ref 4–5.6)
HCT VFR BLD AUTO: 33.2 % (ref 42–52)
HDLC SERPL-MCNC: 37 MG/DL
HGB BLD-MCNC: 10.8 G/DL (ref 14–18)
IMM GRANULOCYTES # BLD AUTO: 0.18 K/UL (ref 0–0.11)
IMM GRANULOCYTES NFR BLD AUTO: 2.2 % (ref 0–0.9)
INR PPP: 1.02 (ref 0.87–1.13)
LDLC SERPL CALC-MCNC: 129 MG/DL
LYMPHOCYTES # BLD AUTO: 1.79 K/UL (ref 1–4.8)
LYMPHOCYTES NFR BLD: 21.7 % (ref 22–41)
MAGNESIUM SERPL-MCNC: 2.2 MG/DL (ref 1.5–2.5)
MCH RBC QN AUTO: 30.3 PG (ref 27–33)
MCHC RBC AUTO-ENTMCNC: 32.5 G/DL (ref 32.3–36.5)
MCV RBC AUTO: 93.3 FL (ref 81.4–97.8)
MONOCYTES # BLD AUTO: 0.84 K/UL (ref 0–0.85)
MONOCYTES NFR BLD AUTO: 10.2 % (ref 0–13.4)
NEUTROPHILS # BLD AUTO: 5.24 K/UL (ref 1.82–7.42)
NEUTROPHILS NFR BLD: 63.7 % (ref 44–72)
NRBC # BLD AUTO: 0.02 K/UL
NRBC BLD-RTO: 0.2 /100 WBC (ref 0–0.2)
PHOSPHATE SERPL-MCNC: 3.4 MG/DL (ref 2.5–4.5)
PLATELET # BLD AUTO: 295 K/UL (ref 164–446)
PMV BLD AUTO: 9.9 FL (ref 9–12.9)
POTASSIUM SERPL-SCNC: 4.1 MMOL/L (ref 3.6–5.5)
PROT SERPL-MCNC: 6.4 G/DL (ref 6–8.2)
PROTHROMBIN TIME: 13.3 SEC (ref 12–14.6)
RBC # BLD AUTO: 3.56 M/UL (ref 4.7–6.1)
SODIUM SERPL-SCNC: 138 MMOL/L (ref 135–145)
TRIGL SERPL-MCNC: 199 MG/DL (ref 0–149)
TSH SERPL DL<=0.005 MIU/L-ACNC: 5.11 UIU/ML (ref 0.38–5.33)
WBC # BLD AUTO: 8.2 K/UL (ref 4.8–10.8)

## 2023-05-24 PROCEDURE — 99232 SBSQ HOSP IP/OBS MODERATE 35: CPT | Performed by: PHYSICAL MEDICINE & REHABILITATION

## 2023-05-24 PROCEDURE — 97530 THERAPEUTIC ACTIVITIES: CPT

## 2023-05-24 PROCEDURE — 85610 PROTHROMBIN TIME: CPT

## 2023-05-24 PROCEDURE — 97162 PT EVAL MOD COMPLEX 30 MIN: CPT

## 2023-05-24 PROCEDURE — 84100 ASSAY OF PHOSPHORUS: CPT

## 2023-05-24 PROCEDURE — 770010 HCHG ROOM/CARE - REHAB SEMI PRIVAT*

## 2023-05-24 PROCEDURE — 85025 COMPLETE CBC W/AUTO DIFF WBC: CPT

## 2023-05-24 PROCEDURE — 80053 COMPREHEN METABOLIC PANEL: CPT

## 2023-05-24 PROCEDURE — 84443 ASSAY THYROID STIM HORMONE: CPT

## 2023-05-24 PROCEDURE — 97535 SELF CARE MNGMENT TRAINING: CPT

## 2023-05-24 PROCEDURE — 82306 VITAMIN D 25 HYDROXY: CPT

## 2023-05-24 PROCEDURE — 83036 HEMOGLOBIN GLYCOSYLATED A1C: CPT

## 2023-05-24 PROCEDURE — 36415 COLL VENOUS BLD VENIPUNCTURE: CPT

## 2023-05-24 PROCEDURE — 74018 RADEX ABDOMEN 1 VIEW: CPT

## 2023-05-24 PROCEDURE — 97167 OT EVAL HIGH COMPLEX 60 MIN: CPT

## 2023-05-24 PROCEDURE — 80061 LIPID PANEL: CPT

## 2023-05-24 PROCEDURE — 700111 HCHG RX REV CODE 636 W/ 250 OVERRIDE (IP): Performed by: PHYSICAL MEDICINE & REHABILITATION

## 2023-05-24 PROCEDURE — 83735 ASSAY OF MAGNESIUM: CPT

## 2023-05-24 PROCEDURE — A9270 NON-COVERED ITEM OR SERVICE: HCPCS | Performed by: PHYSICAL MEDICINE & REHABILITATION

## 2023-05-24 PROCEDURE — 700102 HCHG RX REV CODE 250 W/ 637 OVERRIDE(OP): Performed by: PHYSICAL MEDICINE & REHABILITATION

## 2023-05-24 PROCEDURE — 700101 HCHG RX REV CODE 250: Performed by: PHYSICAL MEDICINE & REHABILITATION

## 2023-05-24 RX ORDER — GABAPENTIN 300 MG/1
600 CAPSULE ORAL 3 TIMES DAILY
Status: DISCONTINUED | OUTPATIENT
Start: 2023-05-24 | End: 2023-05-25

## 2023-05-24 RX ORDER — SIMETHICONE 125 MG
125 TABLET,CHEWABLE ORAL 3 TIMES DAILY PRN
Status: DISCONTINUED | OUTPATIENT
Start: 2023-05-24 | End: 2023-06-08 | Stop reason: HOSPADM

## 2023-05-24 RX ORDER — DULOXETIN HYDROCHLORIDE 30 MG/1
30 CAPSULE, DELAYED RELEASE ORAL DAILY
Status: DISCONTINUED | OUTPATIENT
Start: 2023-05-24 | End: 2023-05-25

## 2023-05-24 RX ORDER — ATORVASTATIN CALCIUM 10 MG/1
20 TABLET, FILM COATED ORAL EVERY EVENING
Status: DISCONTINUED | OUTPATIENT
Start: 2023-05-24 | End: 2023-06-08 | Stop reason: HOSPADM

## 2023-05-24 RX ORDER — LIDOCAINE HYDROCHLORIDE 20 MG/ML
JELLY TOPICAL EVERY 4 HOURS PRN
Status: COMPLETED | OUTPATIENT
Start: 2023-05-24 | End: 2023-05-25

## 2023-05-24 RX ORDER — VITAMIN B COMPLEX
2000 TABLET ORAL DAILY
Status: DISCONTINUED | OUTPATIENT
Start: 2023-05-24 | End: 2023-06-08 | Stop reason: HOSPADM

## 2023-05-24 RX ADMIN — ATORVASTATIN CALCIUM 20 MG: 10 TABLET, FILM COATED ORAL at 20:45

## 2023-05-24 RX ADMIN — DOCUSATE SODIUM 200 MG: 100 CAPSULE, LIQUID FILLED ORAL at 08:02

## 2023-05-24 RX ADMIN — OXYCODONE HYDROCHLORIDE 10 MG: 10 TABLET ORAL at 06:22

## 2023-05-24 RX ADMIN — MORPHINE SULFATE 15 MG: 15 TABLET, FILM COATED, EXTENDED RELEASE ORAL at 08:02

## 2023-05-24 RX ADMIN — CYCLOBENZAPRINE 10 MG: 10 TABLET, FILM COATED ORAL at 12:14

## 2023-05-24 RX ADMIN — ACETAMINOPHEN 1000 MG: 500 TABLET ORAL at 20:45

## 2023-05-24 RX ADMIN — MIDODRINE HYDROCHLORIDE 5 MG: 2.5 TABLET ORAL at 11:34

## 2023-05-24 RX ADMIN — DOCUSATE SODIUM 200 MG: 100 CAPSULE, LIQUID FILLED ORAL at 20:44

## 2023-05-24 RX ADMIN — MORPHINE SULFATE 15 MG: 15 TABLET, FILM COATED, EXTENDED RELEASE ORAL at 20:45

## 2023-05-24 RX ADMIN — LIDOCAINE 1 PATCH: 50 PATCH TOPICAL at 08:03

## 2023-05-24 RX ADMIN — OXYCODONE HYDROCHLORIDE 10 MG: 10 TABLET ORAL at 10:24

## 2023-05-24 RX ADMIN — OXYCODONE HYDROCHLORIDE 10 MG: 10 TABLET ORAL at 20:45

## 2023-05-24 RX ADMIN — OXYCODONE HYDROCHLORIDE 10 MG: 10 TABLET ORAL at 03:32

## 2023-05-24 RX ADMIN — GABAPENTIN 600 MG: 300 CAPSULE ORAL at 15:07

## 2023-05-24 RX ADMIN — OXYCODONE HYDROCHLORIDE 10 MG: 10 TABLET ORAL at 15:12

## 2023-05-24 RX ADMIN — ACETAMINOPHEN 1000 MG: 500 TABLET ORAL at 15:07

## 2023-05-24 RX ADMIN — OMEPRAZOLE 20 MG: 20 CAPSULE, DELAYED RELEASE ORAL at 08:03

## 2023-05-24 RX ADMIN — ENOXAPARIN SODIUM 40 MG: 100 INJECTION SUBCUTANEOUS at 20:43

## 2023-05-24 RX ADMIN — BISACODYL 10 MG: 10 SUPPOSITORY RECTAL at 20:44

## 2023-05-24 RX ADMIN — CYCLOBENZAPRINE 10 MG: 10 TABLET, FILM COATED ORAL at 20:44

## 2023-05-24 RX ADMIN — Medication 2000 UNITS: at 11:34

## 2023-05-24 RX ADMIN — ACETAMINOPHEN 1000 MG: 500 TABLET ORAL at 08:01

## 2023-05-24 RX ADMIN — MULTIPLE VITAMINS W/ MINERALS TAB 1 TABLET: TAB at 11:33

## 2023-05-24 RX ADMIN — TRAZODONE HYDROCHLORIDE 50 MG: 50 TABLET ORAL at 20:45

## 2023-05-24 RX ADMIN — CYCLOBENZAPRINE 10 MG: 10 TABLET, FILM COATED ORAL at 06:22

## 2023-05-24 RX ADMIN — MIDODRINE HYDROCHLORIDE 5 MG: 2.5 TABLET ORAL at 17:14

## 2023-05-24 RX ADMIN — GABAPENTIN 600 MG: 300 CAPSULE ORAL at 20:44

## 2023-05-24 RX ADMIN — MIDODRINE HYDROCHLORIDE 5 MG: 2.5 TABLET ORAL at 08:02

## 2023-05-24 RX ADMIN — SENNOSIDES 17.2 MG: 8.6 TABLET, FILM COATED ORAL at 11:33

## 2023-05-24 RX ADMIN — GABAPENTIN 300 MG: 300 CAPSULE ORAL at 08:02

## 2023-05-24 RX ADMIN — DULOXETINE HYDROCHLORIDE 30 MG: 30 CAPSULE, DELAYED RELEASE ORAL at 11:35

## 2023-05-24 SDOH — ECONOMIC STABILITY: TRANSPORTATION INSECURITY
IN THE PAST 12 MONTHS, HAS LACK OF RELIABLE TRANSPORTATION KEPT YOU FROM MEDICAL APPOINTMENTS, MEETINGS, WORK OR FROM GETTING THINGS NEEDED FOR DAILY LIVING?: NO

## 2023-05-24 SDOH — ECONOMIC STABILITY: TRANSPORTATION INSECURITY
IN THE PAST 12 MONTHS, HAS THE LACK OF TRANSPORTATION KEPT YOU FROM MEDICAL APPOINTMENTS OR FROM GETTING MEDICATIONS?: NO

## 2023-05-24 ASSESSMENT — GAIT ASSESSMENTS: GAIT LEVEL OF ASSIST: UNABLE TO PARTICIPATE

## 2023-05-24 ASSESSMENT — PATIENT HEALTH QUESTIONNAIRE - PHQ9
2. FEELING DOWN, DEPRESSED, IRRITABLE, OR HOPELESS: NOT AT ALL
SUM OF ALL RESPONSES TO PHQ9 QUESTIONS 1 AND 2: 0
1. LITTLE INTEREST OR PLEASURE IN DOING THINGS: NOT AT ALL

## 2023-05-24 ASSESSMENT — BRIEF INTERVIEW FOR MENTAL STATUS (BIMS)
ASKED TO RECALL BLUE: YES, NO CUE REQUIRED
WHAT YEAR IS IT: CORRECT
BIMS SUMMARY SCORE: 15
WHAT MONTH IS IT: ACCURATE WITHIN 5 DAYS
INITIAL REPETITION OF BED BLUE SOCK - FIRST ATTEMPT: 3
WHAT DAY OF THE WEEK IS IT: CORRECT
ASKED TO RECALL BED: YES, NO CUE REQUIRED
ASKED TO RECALL SOCK: YES, NO CUE REQUIRED

## 2023-05-24 ASSESSMENT — ACTIVITIES OF DAILY LIVING (ADL)
TOILETING: INDEPENDENT
BED_CHAIR_WHEELCHAIR_TRANSFER_DESCRIPTION: INCREASED TIME;INITIAL PREPARATION FOR TASK;SET-UP OF EQUIPMENT;SUPERVISION FOR SAFETY;VERBAL CUEING

## 2023-05-24 NOTE — PROGRESS NOTES
Transfer Level & Assistive Devices Used: Max 2-3    Patient Position: bed side commode (E.g., bed, bed side commode, mobile shower commode, commode over toilet, regular toilet)     Adaptive Equipment Used? none (E.g., digital stimulator, toileting aid for hygiene, suppository )      Patient Sensation and Bowel Urgency Present? yes     Incontinence? no     BM Results: small soft    1. Caregiver Present and actively participating in training? no    2. Patient Demonstrated Understanding with Bowel Medications and Purpose: yes     3. Patient Participation with Suppository Placement: no     4. Patient Participation with Digital Stimulation: no     5. Patient Participation with Clothing Management: no     6. Patient Participation with Hygiene: no        (If patient meets all 6 of the criteria numbered above, consider rescheduling bowel program to evening 1700 or 0500.)

## 2023-05-24 NOTE — PROGRESS NOTES
Patient admitted to facility at 1210 via wheelchair; accompanied by hospital transport.  Patient assisted to room and positioned in bed for comfort and safety; call light within reach.  Patient assisted with stowing belongings and oriented to room and facility.  Admission assessment performed and documented in computer. Patient received and reviewed education binder. Admission paperwork completed; signed copies placed in chart. COVID-PCR obtained and sent to lab. Will continue to monitor.

## 2023-05-24 NOTE — DISCHARGE PLANNING
CASE MANAGEMENT INITIAL ASSESSMENT    Admit Date:  5/23/2023     I met with pt who is very pleasant and motivated to work w/ therapy  to discuss role of case management / discharge planning / team conference.   Patient is a  42 y.o. male transferred from [facility] where he was hospitalized from 5/16 to 5/23.      PT prefers to have sister Sary Michael be his primary spokesperson not his spouse who he is in the process of going thru a seperation    Diagnosis: Incomplete paraplegia (HCC) [G82.22]    Co-morbidities:   Patient Active Problem List    Diagnosis Date Noted    Acute pain of right shoulder 05/23/2023    Incomplete paraplegia (HCC) 05/23/2023    T12 burst fracture ([facility]) 05/23/2023    Neuropathic pain 05/23/2023    Postoperative pain 05/23/2023    Neurogenic orthostatic hypotension ([facility]) 05/23/2023    Spasticity 05/23/2023    Adjustment disorder with mixed anxiety and depressed mood 05/23/2023    Acute pain of left knee 05/22/2023    Neurogenic bladder 05/19/2023    Neurogenic bowel 05/19/2023    Closed fracture of three ribs of right side 05/17/2023    Lumbar transverse process fracture, closed, initial encounter ([facility]) 05/17/2023    Closed fracture of spinous process of thoracic vertebra ([facility]) 05/17/2023    Trauma 05/16/2023    No contraindication to deep vein thrombosis (DVT) prophylaxis 05/16/2023    Spinal cord injury at T7-T12 level ([facility]) 05/16/2023    Bilateral pulmonary contusion 05/16/2023     Prior Living Situation:  H[facility] / Facility: [address] [facility] [address] [place] [facility]  Lives with - Patient's Self Care Capacity: Has been living with spouse in [place]; they are in process of being seperated.     Prior Level of Function:  Medication Management: Independent  Finances: Independent  Home Management: Independent  Shopping: Independent  Prior Level Of Mobility: Independent Without Device in Community  Driving / Transportation: Independent    Support Systems:  Primary : Sister Sary Michael who lives in  " [phone] [employer]  [address]    Other support systems: Mother Claudine Michael ; 2 nieces - all reside in [place]  Advance Directives: No  Power of  (Name & Phone): no    Previous Services Utilized:   Equipment Owned: None  Prior Services: None, Home-Independent    Other Information:  Occupation (Pre-Hospital Vocational): Employed Full Time as a ; 4 12 hours night shifts     Primary Payor Source: Ignacio   Secondary Payor Source:  none  Primary Care Practitioner : none  Other MDs: Dr Vita Barger Neurosurgeon    Patient / Family Goal:  Patient / Family Goal: \" I want to be able to walk again\"  \"I plans to stay with my sister and her 2 dtrs in [place].  My sister will come to [place] for family training a few days before dc.  I plan to go to my house in [place] to  some belongings/ animals and the go to [place]\"    Plan:  1. Continue to follow patient through hospitalization and provide discharge planning in collaboration with patient, family, physicians and ancillary services.     2. Utilize community resources to ensure a safe discharge.     3. Assist w/ completing disability paperwork; disabled parking permit; will need to establish w/ a PCP in [place]; home health vs out pt therapy; dme to be determined; follow up with Dr Barger Neurosurgeon.      "
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
## 2023-05-24 NOTE — PROGRESS NOTES
4 Eyes Skin Assessment Completed by OBIE Kendall and OBIE Javier.    Head WDL  Ears WDL  Nose WDL  Mouth WDL  Neck WDL  Breast/Chest right Scab from central line  Shoulder Blades WDL  Spine surgical Incision with staples  (R) Arm/Elbow/Hand WDL  (L) Arm/Elbow/Hand WDL  Abdomen Left flank with large discoloration bruising  Groin WDL  Scrotum/Coccyx/Buttocks WDL  (R) Leg WDL  (L) Leg left thigh Redness and noted laceration wound  (R) Heel/Foot/Toe WDL  (L) Heel/Foot/Toe WDL          Devices In Places Ramon      Interventions In Place Chair Waffle and Q2 Turns    Possible Skin Injury Yes    Pictures Uploaded Into Epic Yes  Wound Consult Placed Yes  RN Wound Prevention Protocol Ordered Yes
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
## 2023-05-25 ENCOUNTER — APPOINTMENT (OUTPATIENT)
Dept: PHYSICAL THERAPY | Facility: REHABILITATION | Age: 43
DRG: 949 | End: 2023-05-25
Attending: PHYSICAL MEDICINE & REHABILITATION
Payer: COMMERCIAL

## 2023-05-25 ENCOUNTER — APPOINTMENT (OUTPATIENT)
Dept: OCCUPATIONAL THERAPY | Facility: REHABILITATION | Age: 43
End: 2023-05-25
Attending: PHYSICAL MEDICINE & REHABILITATION
Payer: COMMERCIAL

## 2023-05-25 PROCEDURE — 97535 SELF CARE MNGMENT TRAINING: CPT

## 2023-05-25 PROCEDURE — 700101 HCHG RX REV CODE 250: Performed by: PHYSICAL MEDICINE & REHABILITATION

## 2023-05-25 PROCEDURE — 700102 HCHG RX REV CODE 250 W/ 637 OVERRIDE(OP): Performed by: PHYSICAL MEDICINE & REHABILITATION

## 2023-05-25 PROCEDURE — 97112 NEUROMUSCULAR REEDUCATION: CPT

## 2023-05-25 PROCEDURE — 99233 SBSQ HOSP IP/OBS HIGH 50: CPT | Performed by: PHYSICAL MEDICINE & REHABILITATION

## 2023-05-25 PROCEDURE — 97530 THERAPEUTIC ACTIVITIES: CPT

## 2023-05-25 PROCEDURE — 770010 HCHG ROOM/CARE - REHAB SEMI PRIVAT*

## 2023-05-25 PROCEDURE — A9270 NON-COVERED ITEM OR SERVICE: HCPCS | Performed by: PHYSICAL MEDICINE & REHABILITATION

## 2023-05-25 PROCEDURE — 97110 THERAPEUTIC EXERCISES: CPT

## 2023-05-25 PROCEDURE — 700111 HCHG RX REV CODE 636 W/ 250 OVERRIDE (IP): Performed by: PHYSICAL MEDICINE & REHABILITATION

## 2023-05-25 RX ORDER — MIDODRINE HYDROCHLORIDE 10 MG/1
10 TABLET ORAL
Status: DISCONTINUED | OUTPATIENT
Start: 2023-05-25 | End: 2023-05-30

## 2023-05-25 RX ORDER — DULOXETIN HYDROCHLORIDE 30 MG/1
60 CAPSULE, DELAYED RELEASE ORAL DAILY
Status: DISCONTINUED | OUTPATIENT
Start: 2023-05-26 | End: 2023-06-08 | Stop reason: HOSPADM

## 2023-05-25 RX ORDER — GABAPENTIN 400 MG/1
800 CAPSULE ORAL 3 TIMES DAILY
Status: DISCONTINUED | OUTPATIENT
Start: 2023-05-25 | End: 2023-05-26

## 2023-05-25 RX ORDER — TAMSULOSIN HYDROCHLORIDE 0.4 MG/1
0.4 CAPSULE ORAL
Status: DISCONTINUED | OUTPATIENT
Start: 2023-05-25 | End: 2023-05-26

## 2023-05-25 RX ADMIN — GABAPENTIN 800 MG: 400 CAPSULE ORAL at 20:19

## 2023-05-25 RX ADMIN — MULTIPLE VITAMINS W/ MINERALS TAB 1 TABLET: TAB at 12:03

## 2023-05-25 RX ADMIN — DOCUSATE SODIUM 200 MG: 100 CAPSULE, LIQUID FILLED ORAL at 08:27

## 2023-05-25 RX ADMIN — GABAPENTIN 600 MG: 300 CAPSULE ORAL at 08:27

## 2023-05-25 RX ADMIN — OXYCODONE HYDROCHLORIDE 10 MG: 10 TABLET ORAL at 06:09

## 2023-05-25 RX ADMIN — ACETAMINOPHEN 1000 MG: 500 TABLET ORAL at 14:22

## 2023-05-25 RX ADMIN — OMEPRAZOLE 20 MG: 20 CAPSULE, DELAYED RELEASE ORAL at 08:28

## 2023-05-25 RX ADMIN — CYCLOBENZAPRINE 10 MG: 10 TABLET, FILM COATED ORAL at 20:19

## 2023-05-25 RX ADMIN — GABAPENTIN 800 MG: 400 CAPSULE ORAL at 14:21

## 2023-05-25 RX ADMIN — MORPHINE SULFATE 15 MG: 15 TABLET, FILM COATED, EXTENDED RELEASE ORAL at 08:27

## 2023-05-25 RX ADMIN — DULOXETINE HYDROCHLORIDE 30 MG: 30 CAPSULE, DELAYED RELEASE ORAL at 08:28

## 2023-05-25 RX ADMIN — OXYCODONE HYDROCHLORIDE 10 MG: 10 TABLET ORAL at 03:04

## 2023-05-25 RX ADMIN — SENNOSIDES 17.2 MG: 8.6 TABLET, FILM COATED ORAL at 12:03

## 2023-05-25 RX ADMIN — DOCUSATE SODIUM 200 MG: 100 CAPSULE, LIQUID FILLED ORAL at 20:19

## 2023-05-25 RX ADMIN — ACETAMINOPHEN 1000 MG: 500 TABLET ORAL at 20:18

## 2023-05-25 RX ADMIN — ATORVASTATIN CALCIUM 20 MG: 10 TABLET, FILM COATED ORAL at 20:19

## 2023-05-25 RX ADMIN — OXYCODONE HYDROCHLORIDE 10 MG: 10 TABLET ORAL at 15:45

## 2023-05-25 RX ADMIN — OXYCODONE 5 MG: 5 TABLET ORAL at 12:06

## 2023-05-25 RX ADMIN — ENOXAPARIN SODIUM 40 MG: 100 INJECTION SUBCUTANEOUS at 20:19

## 2023-05-25 RX ADMIN — Medication 2000 UNITS: at 08:27

## 2023-05-25 RX ADMIN — MORPHINE SULFATE 15 MG: 15 TABLET, FILM COATED, EXTENDED RELEASE ORAL at 20:19

## 2023-05-25 RX ADMIN — LIDOCAINE 1 PATCH: 50 PATCH TOPICAL at 08:28

## 2023-05-25 RX ADMIN — LIDOCAINE HYDROCHLORIDE 3 ML: 20 JELLY TOPICAL at 14:13

## 2023-05-25 RX ADMIN — OXYCODONE HYDROCHLORIDE 10 MG: 10 TABLET ORAL at 00:04

## 2023-05-25 RX ADMIN — MIDODRINE HYDROCHLORIDE 5 MG: 2.5 TABLET ORAL at 12:03

## 2023-05-25 RX ADMIN — ACETAMINOPHEN 1000 MG: 500 TABLET ORAL at 08:28

## 2023-05-25 RX ADMIN — TAMSULOSIN HYDROCHLORIDE 0.4 MG: 0.4 CAPSULE ORAL at 17:23

## 2023-05-25 RX ADMIN — CYCLOBENZAPRINE 10 MG: 10 TABLET, FILM COATED ORAL at 06:09

## 2023-05-25 RX ADMIN — MIDODRINE HYDROCHLORIDE 5 MG: 2.5 TABLET ORAL at 08:28

## 2023-05-25 RX ADMIN — BISACODYL 10 MG: 10 SUPPOSITORY RECTAL at 19:48

## 2023-05-25 RX ADMIN — CYCLOBENZAPRINE 10 MG: 10 TABLET, FILM COATED ORAL at 12:03

## 2023-05-25 RX ADMIN — MIDODRINE HYDROCHLORIDE 10 MG: 10 TABLET ORAL at 17:23

## 2023-05-25 ASSESSMENT — PAIN DESCRIPTION - PAIN TYPE
TYPE: ACUTE PAIN
TYPE: ACUTE PAIN

## 2023-05-25 ASSESSMENT — PATIENT HEALTH QUESTIONNAIRE - PHQ9
1. LITTLE INTEREST OR PLEASURE IN DOING THINGS: NOT AT ALL
2. FEELING DOWN, DEPRESSED, IRRITABLE, OR HOPELESS: NOT AT ALL
SUM OF ALL RESPONSES TO PHQ9 QUESTIONS 1 AND 2: 0

## 2023-05-25 NOTE — PROGRESS NOTES
Transfer Level & Assistive Devices Used: Max 2-3    Patient Position: Bedside commode (E.g., bed, bed side commode, mobile shower commode, commode over toilet, regular toilet)     Adaptive Equipment Used? No (E.g., digital stimulator, toileting aid for hygiene, suppository )      Patient Sensation and Bowel Urgency Present? Yes     Incontinence? No     BM Results: Small soft    1. Caregiver Present and actively participating in training? No    2. Patient Demonstrated Understanding with Bowel Medications and Purpose: Yes     3. Patient Participation with Suppository Placement: No     4. Patient Participation with Digital Stimulation: No     5. Patient Participation with Clothing Management: No     6. Patient Participation with Hygiene: No        (If patient meets all 6 of the criteria numbered above, consider rescheduling bowel program to evening 1700 or 0500.)
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
## 2023-05-25 NOTE — THERAPY
"Physical Therapy   Daily Treatment     Patient Name: Bharath Diaz  Age:  42 y.o., Sex:  male  Medical Record #: [id number]  Today's Date: 5/25/2023     Precautions  Precautions: Fall Risk, TLSO (Thoracolumbosacral orthosis), Spinal / Back Precautions   Comments: possible R partial infraspinatus tear; R rib 1,8,9 fxs; T11-L1 posterior fusion; L3 AIS B    Subjective    \"I can do another stand\"     Objective       05/25/23 0830   PT Charge Group   PT Therapeutic Exercise (Units) 2   PT Neuromuscular Re-Education / Balance (Units) 2   PT Therapeutic Activities (Units) 2   PT Total Time Spent   PT Individual Total Time Spent (Mins) 90   Vitals   Pulse (!) 105   Patient BP Position Sitting   Blood Pressure 134/57   Vitals Comments teds donned after transferring and reporting lightheadedness   Pain 0 - 10 Group   Location Back;Shoulder   Location Orientation Right;Mid   Pain Rating Scale (NPRS) 4   Description Aching   Comfort Goal Comfort with Movement;Perform Activity   Transfer Functional Level of Assist   Bed, Chair, Wheelchair Transfer Maximal Assist  (bed>WC; as good as modA WC<>firm mat)   Bed Chair Wheelchair Transfer Description Adaptive equipment;Increased time;Set-up of equipment;Squat pivot transfer to wheelchair;Supervision for safety;Verbal cueing;Initial preparation for task   Toilet Transfers Total Assist  (modA pull to  SaraStedy)   Supine Lower Body Exercise   Bridges Two Legged;2 sets of 10  (minimal liftoff)   Hip Abduction 2 sets of 10;Left;Right   (supine)   Straight Leg Raises Front;1 set of 10;Right;Left   Short Arc Quad 2 sets of 10;Right ;Left  (2.5lb ankle weight)   Heel Slide 1 set of 10;Right;Left   Other Exercises hooklying marching c abdominal brace 2x10   Bed Mobility    Supine to Sit Moderate Assist   Sit to Supine Moderate Assist   Sit to Stand Moderate Assist   Neuro-Muscular Treatments   Neuro-Muscular Treatments Co-Contraction;Compensatory Strategies;Postural " Facilitation;Verbal Cuing;Weight Shift Right;Weight Shift Left   Comments standing tolerance B UE support in // bars 3 bouts 2-3min ea, last bought lateral weight shifts; all with B knee block   Interdisciplinary Plan of Care Collaboration   IDT Collaboration with  Occupational Therapist;Therapy Tech;Nursing   Patient Position at End of Therapy Seated;Chair Alarm On;Self Releasing Lap Belt Applied;Call Light within Reach;Tray Table within Reach  (ice pack to R shoulder)   Collaboration Comments toilet transfer strategies; assist with care     Assisted pt to rolling shower chair via niesha stedy to attempt BM. Pt passing gas but no significant void of bowels  DepA for pants management and jasper care  Completed squat pivot transfer training with breakdown of sequencing and body mechanics  Completed bed mobility training with logroll over L side over body    Assessment    Pt demonstrated good carryover of squat pivot transfer training for sequencing and mechanics, and was able to perform as good as modAx1 from firm mat<>mat. Pt has minimal glute activation in standing with tendency to hang on his ligaments and hyperextend for stability. Pt is limited primarily by R shoulder and low back pain, dizziness with rolling in bed, lightheadedness in standing (vitals stable-see flowsheet), and B LE weakness.  Strengths: Able to follow instructions, Good carryover of learning, Good insight into deficits/needs, Independent prior level of function, Making steady progress towards goals, Motivated for self care and independence, Pleasant and cooperative, Supportive family, Willingly participates in therapeutic activities  Barriers: Bladder retention, Bowel incontinence, Impaired activity tolerance, Impaired balance, Pain, Limited mobility (paraplegia)    Plan    Squat pivot transfer training, standing tolerance, initiate gait training with Arjo and Vector, WC mobility, bed mobility, B LE NRE    Passport items to be completed:  Get  in/out of bed safely, in/out of a vehicle, safely use mobility device, walk or wheel around home/community, navigate up and down stairs, show how to get up/down from the ground, ensure home is accessible, demonstrate HEP, complete caregiver training    Physical Therapy Problems (Active)       Problem: Balance       Dates: Start:  05/24/23         Goal: STG-Within one week, patient will maintain static standing balance with B UE support and Ambar for 5 minutes to improve endurance and standing tolerance       Dates: Start:  05/24/23               Problem: Mobility       Dates: Start:  05/24/23         Goal: STG-Within one week, patient will propel wheelchair 50ft with SPV       Dates: Start:  05/24/23            Goal: STG-Within one week, patient will ambulate 10ft in parallel bars with maxA and WC follow       Dates: Start:  05/24/23               Problem: Mobility Transfers       Dates: Start:  05/24/23         Goal: STG-Within one week, patient will perform bed mobility with Ambar       Dates: Start:  05/24/23            Goal: STG-Within one week, patient will sit to stand with CGA-Ambar in parallel bars       Dates: Start:  05/24/23            Goal: STG-Within one week, patient will transfer bed to chair with maxA x1 person       Dates: Start:  05/24/23               Problem: PT-Long Term Goals       Dates: Start:  05/24/23         Goal: LTG-By discharge, patient will tolerate standing for 10 min and intermittent UE support with SBA in order to facilitate performance of ADLs       Dates: Start:  05/24/23            Goal: LTG-By discharge, patient will propel wheelchair 150ft Bossman       Dates: Start:  05/24/23            Goal: LTG-By discharge, patient will ambulate 50ft Ambar using LRAD       Dates: Start:  05/24/23            Goal: LTG-By discharge, patient will transfer one surface to another with CGA       Dates: Start:  05/24/23            Goal: LTG-By discharge, patient will perform home exercise program  independently       Dates: Start:  05/24/23            Goal: LTG-By discharge, patient will transfer in/out of a car with modA       Dates: Start:  05/24/23            Goal: LTG-By discharge, patient will perform bed mobility independently       Dates: Start:  05/24/23

## 2023-05-26 ENCOUNTER — APPOINTMENT (OUTPATIENT)
Dept: PHYSICAL THERAPY | Facility: REHABILITATION | Age: 43
DRG: 949 | End: 2023-05-26
Attending: PHYSICAL MEDICINE & REHABILITATION
Payer: COMMERCIAL

## 2023-05-26 LAB — TESTOST SERPL-MCNC: 246 NG/DL (ref 175–781)

## 2023-05-26 PROCEDURE — 97530 THERAPEUTIC ACTIVITIES: CPT

## 2023-05-26 PROCEDURE — 770010 HCHG ROOM/CARE - REHAB SEMI PRIVAT*

## 2023-05-26 PROCEDURE — 97110 THERAPEUTIC EXERCISES: CPT

## 2023-05-26 PROCEDURE — A9270 NON-COVERED ITEM OR SERVICE: HCPCS | Performed by: PHYSICAL MEDICINE & REHABILITATION

## 2023-05-26 PROCEDURE — 97116 GAIT TRAINING THERAPY: CPT

## 2023-05-26 PROCEDURE — 84403 ASSAY OF TOTAL TESTOSTERONE: CPT

## 2023-05-26 PROCEDURE — 36415 COLL VENOUS BLD VENIPUNCTURE: CPT

## 2023-05-26 PROCEDURE — 99232 SBSQ HOSP IP/OBS MODERATE 35: CPT | Performed by: PHYSICAL MEDICINE & REHABILITATION

## 2023-05-26 PROCEDURE — 700101 HCHG RX REV CODE 250: Performed by: PHYSICAL MEDICINE & REHABILITATION

## 2023-05-26 PROCEDURE — 97535 SELF CARE MNGMENT TRAINING: CPT

## 2023-05-26 PROCEDURE — 97112 NEUROMUSCULAR REEDUCATION: CPT

## 2023-05-26 PROCEDURE — 700111 HCHG RX REV CODE 636 W/ 250 OVERRIDE (IP): Performed by: PHYSICAL MEDICINE & REHABILITATION

## 2023-05-26 PROCEDURE — 700102 HCHG RX REV CODE 250 W/ 637 OVERRIDE(OP): Performed by: PHYSICAL MEDICINE & REHABILITATION

## 2023-05-26 RX ORDER — MORPHINE SULFATE 30 MG/1
30 TABLET, FILM COATED, EXTENDED RELEASE ORAL
Status: DISCONTINUED | OUTPATIENT
Start: 2023-05-26 | End: 2023-06-05

## 2023-05-26 RX ORDER — LIDOCAINE 50 MG/G
3 PATCH TOPICAL EVERY 24 HOURS
Status: DISCONTINUED | OUTPATIENT
Start: 2023-05-26 | End: 2023-06-08 | Stop reason: HOSPADM

## 2023-05-26 RX ORDER — TAMSULOSIN HYDROCHLORIDE 0.4 MG/1
0.8 CAPSULE ORAL
Status: DISCONTINUED | OUTPATIENT
Start: 2023-05-26 | End: 2023-06-01

## 2023-05-26 RX ORDER — TIZANIDINE 4 MG/1
2 TABLET ORAL 3 TIMES DAILY
Status: DISCONTINUED | OUTPATIENT
Start: 2023-05-26 | End: 2023-05-30

## 2023-05-26 RX ORDER — LIDOCAINE HYDROCHLORIDE 20 MG/ML
JELLY TOPICAL EVERY 4 HOURS PRN
Status: DISCONTINUED | OUTPATIENT
Start: 2023-05-26 | End: 2023-06-08 | Stop reason: HOSPADM

## 2023-05-26 RX ORDER — GABAPENTIN 300 MG/1
900 CAPSULE ORAL 3 TIMES DAILY
Status: DISCONTINUED | OUTPATIENT
Start: 2023-05-26 | End: 2023-06-08 | Stop reason: HOSPADM

## 2023-05-26 RX ADMIN — MULTIPLE VITAMINS W/ MINERALS TAB 1 TABLET: TAB at 11:57

## 2023-05-26 RX ADMIN — MORPHINE SULFATE 15 MG: 15 TABLET, FILM COATED, EXTENDED RELEASE ORAL at 08:26

## 2023-05-26 RX ADMIN — ACETAMINOPHEN 1000 MG: 500 TABLET ORAL at 08:25

## 2023-05-26 RX ADMIN — BISACODYL 10 MG: 10 SUPPOSITORY RECTAL at 21:18

## 2023-05-26 RX ADMIN — OXYCODONE HYDROCHLORIDE 10 MG: 10 TABLET ORAL at 17:46

## 2023-05-26 RX ADMIN — GABAPENTIN 900 MG: 300 CAPSULE ORAL at 21:16

## 2023-05-26 RX ADMIN — LIDOCAINE HYDROCHLORIDE 3 ML: 20 JELLY TOPICAL at 12:12

## 2023-05-26 RX ADMIN — OXYCODONE HYDROCHLORIDE 10 MG: 10 TABLET ORAL at 12:25

## 2023-05-26 RX ADMIN — ATORVASTATIN CALCIUM 20 MG: 10 TABLET, FILM COATED ORAL at 21:17

## 2023-05-26 RX ADMIN — MIDODRINE HYDROCHLORIDE 10 MG: 10 TABLET ORAL at 08:25

## 2023-05-26 RX ADMIN — MORPHINE SULFATE 30 MG: 30 TABLET, FILM COATED, EXTENDED RELEASE ORAL at 21:17

## 2023-05-26 RX ADMIN — LIDOCAINE 3 PATCH: 50 PATCH CUTANEOUS at 11:57

## 2023-05-26 RX ADMIN — CYCLOBENZAPRINE 10 MG: 10 TABLET, FILM COATED ORAL at 06:11

## 2023-05-26 RX ADMIN — TIZANIDINE 2 MG: 4 TABLET ORAL at 14:42

## 2023-05-26 RX ADMIN — OXYCODONE HYDROCHLORIDE 10 MG: 10 TABLET ORAL at 02:23

## 2023-05-26 RX ADMIN — GABAPENTIN 800 MG: 400 CAPSULE ORAL at 08:24

## 2023-05-26 RX ADMIN — CYCLOBENZAPRINE 10 MG: 10 TABLET, FILM COATED ORAL at 14:42

## 2023-05-26 RX ADMIN — LIDOCAINE 1 PATCH: 50 PATCH TOPICAL at 08:26

## 2023-05-26 RX ADMIN — CYCLOBENZAPRINE 10 MG: 10 TABLET, FILM COATED ORAL at 21:17

## 2023-05-26 RX ADMIN — DOCUSATE SODIUM 200 MG: 100 CAPSULE, LIQUID FILLED ORAL at 08:24

## 2023-05-26 RX ADMIN — ACETAMINOPHEN 1000 MG: 500 TABLET ORAL at 21:16

## 2023-05-26 RX ADMIN — MIDODRINE HYDROCHLORIDE 10 MG: 10 TABLET ORAL at 17:47

## 2023-05-26 RX ADMIN — LIDOCAINE HYDROCHLORIDE 2 ML: 20 JELLY TOPICAL at 22:30

## 2023-05-26 RX ADMIN — ACETAMINOPHEN 1000 MG: 500 TABLET ORAL at 14:42

## 2023-05-26 RX ADMIN — OMEPRAZOLE 20 MG: 20 CAPSULE, DELAYED RELEASE ORAL at 08:25

## 2023-05-26 RX ADMIN — TIZANIDINE 2 MG: 4 TABLET ORAL at 11:57

## 2023-05-26 RX ADMIN — TAMSULOSIN HYDROCHLORIDE 0.8 MG: 0.4 CAPSULE ORAL at 17:47

## 2023-05-26 RX ADMIN — MIDODRINE HYDROCHLORIDE 10 MG: 10 TABLET ORAL at 11:56

## 2023-05-26 RX ADMIN — DOCUSATE SODIUM 200 MG: 100 CAPSULE, LIQUID FILLED ORAL at 21:17

## 2023-05-26 RX ADMIN — DULOXETINE HYDROCHLORIDE 60 MG: 30 CAPSULE, DELAYED RELEASE ORAL at 08:25

## 2023-05-26 RX ADMIN — GABAPENTIN 900 MG: 300 CAPSULE ORAL at 14:41

## 2023-05-26 RX ADMIN — ENOXAPARIN SODIUM 40 MG: 100 INJECTION SUBCUTANEOUS at 21:17

## 2023-05-26 RX ADMIN — TIZANIDINE 2 MG: 4 TABLET ORAL at 21:15

## 2023-05-26 RX ADMIN — LIDOCAINE HYDROCHLORIDE 6 ML: 20 JELLY TOPICAL at 17:59

## 2023-05-26 RX ADMIN — SENNOSIDES 17.2 MG: 8.6 TABLET, FILM COATED ORAL at 11:56

## 2023-05-26 RX ADMIN — Medication 2000 UNITS: at 08:25

## 2023-05-26 ASSESSMENT — PAIN SCALES - WONG BAKER
WONGBAKER_NUMERICALRESPONSE: DOESN'T HURT AT ALL
WONGBAKER_NUMERICALRESPONSE: DOESN'T HURT AT ALL

## 2023-05-26 ASSESSMENT — ACTIVITIES OF DAILY LIVING (ADL)
BED_CHAIR_WHEELCHAIR_TRANSFER_DESCRIPTION: INCREASED TIME;INITIAL PREPARATION FOR TASK;SET-UP OF EQUIPMENT;SUPERVISION FOR SAFETY;VERBAL CUEING;SQUAT PIVOT TRANSFER TO WHEELCHAIR
BED_CHAIR_WHEELCHAIR_TRANSFER_DESCRIPTION: INCREASED TIME;INITIAL PREPARATION FOR TASK;SET-UP OF EQUIPMENT;SUPERVISION FOR SAFETY;VERBAL CUEING;SQUAT PIVOT TRANSFER TO WHEELCHAIR
BED_CHAIR_WHEELCHAIR_TRANSFER_DESCRIPTION: SET-UP OF EQUIPMENT;SQUAT PIVOT TRANSFER TO WHEELCHAIR

## 2023-05-26 ASSESSMENT — GAIT ASSESSMENTS
DEVIATION: ATAXIC;DECREASED BASE OF SUPPORT;BRADYKINETIC;DECREASED HEEL STRIKE
GAIT LEVEL OF ASSIST: TOTAL ASSIST X 2

## 2023-05-26 ASSESSMENT — PAIN DESCRIPTION - PAIN TYPE
TYPE: ACUTE PAIN
TYPE: ACUTE PAIN

## 2023-05-26 NOTE — PROGRESS NOTES
Transfer Level & Assistive Devices Used: stand assist lift device to bedside commode    Patient Position: bedside commode (E.g., bed, bed side commode, mobile shower commode, commode over toilet, regular toilet)     Adaptive Equipment Used? none (E.g., digital stimulator, toileting aid for hygiene, suppository )      Patient Sensation and Bowel Urgency Present? no     Incontinence? no     BM Results: medium, soft, brown digital stimulation x 3. BM does not move down unless digital stimulation was done    1. Caregiver Present and actively participating in training? no    2. Patient Demonstrated Understanding with Bowel Medications and Purpose: yes     3. Patient Participation with Suppository Placement: no     4. Patient Participation with Digital Stimulation: no     5. Patient Participation with Clothing Management: no     6. Patient Participation with Hygiene: no        (If patient meets all 6 of the criteria numbered above, consider rescheduling bowel program to evening 1700 or 0500.)      Other:

## 2023-05-26 NOTE — THERAPY
"Physical Therapy   Daily Treatment     Patient Name: Bharath Diza  Age:  42 y.o., Sex:  male  Medical Record #: [id number]  Today's Date: 5/26/2023     Precautions  Precautions: Fall Risk, TLSO (Thoracolumbosacral orthosis), Spinal / Back Precautions   Comments: possible R partial infraspinatus tear; R rib 1,8,9 fxs; T11-L1 posterior fusion; L3 AIS B    Subjective    Pt up in wc, ready for PT. Pt diaphoretic upon arrival but vital signs WNL. Pt reports \"I'm always sweaty\"     Objective       05/26/23 1031   PT Charge Group   PT Therapeutic Exercise (Units) 1   PT Neuromuscular Re-Education / Balance (Units) 2   PT Therapeutic Activities (Units) 1   PT Total Time Spent   PT Individual Total Time Spent (Mins) 60   Vitals   Pulse (!) 120  (during PT)   Patient BP Position Sitting   Blood Pressure 136/82   Transfer Functional Level of Assist   Bed, Chair, Wheelchair Transfer   (mod A squat pivot/ second person SBA for safety.)   Bed Chair Wheelchair Transfer Description Set-up of equipment;Squat pivot transfer to wheelchair   Supine Lower Body Exercise   Pelvic Tilt 3 sets of 10   Hip Abduction Hook Lying;3 sets of 10;Medium Resistance Theraband   Hip Adduction  3 sets of 10  (hooklying CHUCK ball squeeze)   Heel Slide 3 sets of 10   Gluteal Isometrics 3 sets of 10   Comments pt requires manual cues for minimal glute activation and verbal cues for abdominal bracing with exercises.   Sitting Lower Body Exercises   Sitting Lower Body Exercises Yes   Hamstring Curl 3 sets of 10;Medium Resistance Theraband   Comments AAROM to compete full range   Bed Mobility    Supine to Sit Minimal Assist   Sit to Supine Minimal Assist   Neuro-Muscular Treatments   Neuro-Muscular Treatments Biofeedback;Co-Contraction;Facilitation;Postural Facilitation;Sequencing;Tactile Cuing;Verbal Cuing;Weight Shift Right;Weight Shift Left   Comments Standing frame for neuro re-ed posture/ upright activity tolerance and LE strength/ coordination " training. Mirror for visual feedback. Pt tolerated 10 minutes upright tolerance, completed knee flex/ ext 2 x 10 with manual and verbal cues for concentric/ eccentric control and manual cues to prevent knee hyperextension, mini-squats/ pelvic tilts and anterior/ posterior trunk wt shifting for core activation.   Interdisciplinary Plan of Care Collaboration   IDT Collaboration with  Physical Therapist;Physician   Patient Position at End of Therapy In Bed;Call Light within Reach;Tray Table within Reach;Phone within Reach   Collaboration Comments regarding pt care / POC         Assessment    Pt tolerated upright activity well, demonstrates significant weakness/ limited activation for glutes and ankles remain 0/5. Pt motivated for improvement and demonstrates good learning and carryover for TLSO management and mobility tasks.     Strengths: Able to follow instructions, Good carryover of learning, Good insight into deficits/needs, Independent prior level of function, Making steady progress towards goals, Motivated for self care and independence, Pleasant and cooperative, Supportive family, Willingly participates in therapeutic activities  Barriers: Bladder retention, Bowel incontinence, Impaired activity tolerance, Impaired balance, Pain, Limited mobility (paraplegia)    Plan    Squat pivot transfer training, standing tolerance / standing frame, initiate gait training with Arjo and Vector, WC mobility, bed mobility, B LE NRE     Passport items to be completed:  Get in/out of bed safely, in/out of a vehicle, safely use mobility device, walk or wheel around home/community, navigate up and down stairs, show how to get up/down from the ground, ensure home is accessible, demonstrate HEP, complete caregiver training      Physical Therapy Problems (Active)       Problem: Balance       Dates: Start:  05/24/23         Goal: STG-Within one week, patient will maintain static standing balance with B UE support and Ambar for 5 minutes  to improve endurance and standing tolerance       Dates: Start:  05/24/23    Expected End:  06/08/23               Problem: Mobility       Dates: Start:  05/24/23         Goal: STG-Within one week, patient will propel wheelchair 50ft with SPV       Dates: Start:  05/24/23    Expected End:  06/08/23            Goal: STG-Within one week, patient will ambulate 10ft in parallel bars with maxA and WC follow       Dates: Start:  05/24/23    Expected End:  06/08/23               Problem: Mobility Transfers       Dates: Start:  05/24/23         Goal: STG-Within one week, patient will perform bed mobility with Ambar       Dates: Start:  05/24/23    Expected End:  06/08/23            Goal: STG-Within one week, patient will sit to stand with CGA-Ambar in parallel bars       Dates: Start:  05/24/23    Expected End:  06/08/23            Goal: STG-Within one week, patient will transfer bed to chair with maxA x1 person       Dates: Start:  05/24/23    Expected End:  06/08/23               Problem: PT-Long Term Goals       Dates: Start:  05/24/23         Goal: LTG-By discharge, patient will tolerate standing for 10 min and intermittent UE support with SBA in order to facilitate performance of ADLs       Dates: Start:  05/24/23    Expected End:  06/08/23            Goal: LTG-By discharge, patient will propel wheelchair 150ft Bossman       Dates: Start:  05/24/23    Expected End:  06/08/23            Goal: LTG-By discharge, patient will ambulate 50ft Ambar using LRAD       Dates: Start:  05/24/23    Expected End:  06/08/23            Goal: LTG-By discharge, patient will transfer one surface to another with CGA       Dates: Start:  05/24/23    Expected End:  06/08/23            Goal: LTG-By discharge, patient will perform home exercise program independently       Dates: Start:  05/24/23    Expected End:  06/08/23            Goal: LTG-By discharge, patient will transfer in/out of a car with modA       Dates: Start:  05/24/23    Expected End:   06/08/23            Goal: LTG-By discharge, patient will perform bed mobility independently       Dates: Start:  05/24/23    Expected End:  06/08/23

## 2023-05-26 NOTE — DISCHARGE PLANNING
"CM   Met with pt; provided update from IDT;informed him of dc date of 6/8 - he was surprised thinking he would be in rehab longer; I fax completed \"authorization for release personal information\" as requested by pt to [employer] for his disability.  Tc to pt's paul Vanessa @ [employer] @ [phone]  providing fax no for FMLA forms to be sent to me       "
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
## 2023-05-26 NOTE — THERAPY
"Occupational Therapy  Daily Treatment     Patient Name: Bharath Diaz  Age:  42 y.o., Sex:  male  Medical Record #: [id number]  Today's Date: 5/26/2023     Precautions  Precautions: Fall Risk, TLSO (Thoracolumbosacral orthosis), Spinal / Back Precautions   Comments: possible R partial infraspinatus tear; R rib 1,8,9 fxs; T11-L1 posterior fusion; L3 AIS B         Subjective    Pt seen 2x this day, and agreeable to both sessions. \"Yeah, this feels much better.\" Referring to RUE support of tray on w/c.     Objective       05/25/23 1031 05/25/23 1245   OT Charge Group   OT Self Care / ADL (Units)  --  2   OT Therapy Activity (Units) 2 2   OT Total Time Spent   OT Individual Total Time Spent (Mins) 30 60   Vitals   Pulse  --  97   Patient BP Position  --  Sitting   Blood Pressure  --  120/71   Pulse Oximetry  --  100 %   O2 Delivery Device  --  None - Room Air   Vitals Comments  --  With Alistair donned.   Pain   Intervention Cold Pack;Emotional Support;Rest;Repositioned Cold Pack;Emotional Support;Rest;Repositioned   Pain 0 - 10 Group   Location Shoulder Shoulder   Location Orientation Right Right   Description Aching;Constant Aching;Constant   Comfort Goal Comfort with Movement;Perform Activity Comfort with Movement;Perform Activity   Therapist Pain Assessment During Activity;Nurse Notified During Activity   Cognition    Level of Consciousness Alert Alert   Functional Level of Assist   Upper Body Dressing  --  Minimal Assist   Upper Body Dressing Description  --  Application of orthotic or brace  (No don/doff of shirt, onlt TLSO. Educated on compensatory strategies, using velcro on L-side only, to avoid RUE use due to increased shoulder pain. Continued training required.)   Lower Body Dressing Description  --  Assistive devices;Reacher;Sock aid;Increased time;Cues for spinal precautions;Set-up of equipment;Supervision for safety;Verbal cueing  (Initiated education and training using AE for socks in supine. Min A for " sequencing and cues for use.)   Bed, Chair, Wheelchair Transfer Total Assist X 2  (Mod A x1 Min A x1 SB w/c>EOB even surface. Min A x2 lateral scoot EOB>w/c) Total Assist X 2  (Mod A x1 Min A x1 SB EOB>w/c even surface.)   Bed Chair Wheelchair Transfer Description  --  Adaptive equipment;Slideboard transfer from bed to wheelchair;Set-up of equipment;Increased time;Supervision for safety;Verbal cueing   Toilet Transfers Total Assist X 2  (Min Ax2 lateral scoot no SB EOB<>bariatric platform drop arm bedside commode. Cues for sequencing.)  --    Bed Mobility    Supine to Sit  --  Contact Guard Assist  (Pt completed 2x. Initially, pt with fully EHOB and placing BLE over EOB, then completed a second this using log roll )   Sit to Supine  --  Moderate Assist  (Assist for BLE, with use of bed rail)   Scooting  --  Contact Guard Assist   Rolling  --  Standby Assist   Skilled Intervention  --  Facilitation;Sequencing;Verbal Cuing   Interdisciplinary Plan of Care Collaboration   IDT Collaboration with  Therapy Tech Therapy Tech;Physical Therapist   Patient Position at End of Therapy Seated;Chair Alarm On;Call Light within Reach;Tray Table within Reach;Phone within Reach Seated;Chair Alarm On;Call Light within Reach;Tray Table within Reach   Collaboration Comments Tech assisted with transfers and session. tech assisted with session and transfers, PT re: CLOF, transfers     1030 - Trialed use of bariatric platform drop arm bedside commode with reduction from 2-3 ppl assist (as with the nursing staff for bowel program), and able to complete at Min A x2 for lateral scooting.     1245 - Fabricated RUE arm tray with ACE wraps and towels to increase support due to increased pain from infraspinatus tear. Per pt, good support achieved.     Assessment    Pt making gains in sitting balance, functional transfers, and able to fabricate RUE rest for pain relief. Pt remains limited by BLE weakness, sensation, sitting balance, and spinal  precautions. Demonstrated good carry over learning form TLSO don/doff, but will require continued trials.  Strengths: Able to follow instructions, Alert and oriented, Effective communication skills, Good carryover of learning, Good insight into deficits/needs, Independent prior level of function, Making steady progress towards goals, Manages pain appropriately, Motivated for self care and independence, Pleasant and cooperative, Supportive family, Willingly participates in therapeutic activities  Barriers: Bladder incontinence, Bladder retention, Decreased endurance, Fatigue, Generalized weakness, Impaired activity tolerance, Impaired balance, Limited mobility, Pain    Plan    AE training for LB, sitting balance/functional transfers, BLE NMRE for sensation/strengthening, RUE strengthening without ext rot, begin acquiring pictures and measurements of home setup for DME recommendations.    Passport items to be completed:  Perform bathroom transfers, complete dressing, complete feeding, get ready for the day, prepare a simple meal, participate in household tasks, adapt home for safety needs, demonstrate home exercise program, complete caregiver training     Occupational Therapy Goals (Active)       Problem: Bathing       Dates: Start:  05/24/23         Goal: STG-Within one week, patient will bathe at Min A level using DME/AE PRN.       Dates: Start:  05/24/23    Expected End:  06/08/23               Problem: Dressing       Dates: Start:  05/24/23         Goal: STG-Within one week, patient will dress UB at CGA level including don/doff of TLSO.       Dates: Start:  05/24/23    Expected End:  06/08/23            Goal: STG-Within one week, patient will dress LB at Mod A using AE/compensatory strategies PRN.       Dates: Start:  05/24/23    Expected End:  06/08/23               Problem: Functional Transfers       Dates: Start:  05/24/23         Goal: STG-Within one week, patient will transfer to toilet at Max A x1 using  DME/AE PRN.       Dates: Start:  05/24/23    Expected End:  06/08/23               Problem: OT Long Term Goals       Dates: Start:  05/24/23         Goal: LTG-By discharge, patient will complete basic self care tasks at Min A x1-Mod I level using DME/AE PRN.       Dates: Start:  05/24/23    Expected End:  06/08/23            Goal: LTG-By discharge, patient will perform bathroom transfers at Min A x1-Mod I level using DME/AE PRN.       Dates: Start:  05/24/23    Expected End:  06/08/23            Goal: LTG-By discharge, patient will complete basic home management at Min A x1-Mod I level using DME/AE PRN.       Dates: Start:  05/24/23    Expected End:  06/08/23               Problem: Toileting       Dates: Start:  05/24/23         Goal: STG-Within one week, patient will complete toileting tasks at Max A x1 using DME/AE PRN.       Dates: Start:  05/24/23    Expected End:  06/08/23

## 2023-05-26 NOTE — THERAPY
"Physical Therapy   Daily Treatment     Patient Name: Bharath Diaz  Age:  42 y.o., Sex:  male  Medical Record #: [id number]  Today's Date: 5/26/2023     Precautions  Precautions: Fall Risk, TLSO (Thoracolumbosacral orthosis), Spinal / Back Precautions   Comments: possible R partial infraspinatus tear; R rib 1,8,9 fxs; T11-L1 posterior fusion; L3 AIS B    Subjective    \"That was awesome!\" Referring to walking  Pt also working on getting measurements and photos from sister's and mother's homes respectively to determine best DC destination     Objective       05/26/23 0830 05/26/23 1501   PT Charge Group   PT Gait Training (Units)  --  2   PT Neuromuscular Re-Education / Balance (Units)  --  1   PT Therapeutic Activities (Units) 2 1   PT Total Time Spent   PT Individual Total Time Spent (Mins) 30 60   Non Verbal Descriptors   Non Verbal Scale   --  Tense Body Language;Grimacing   Gait Functional Level of Assist    Gait Level Of Assist  --  Total Assist X 2  (Vector 20% BW support, // bars 10ft, Arjo 40ft with WC follow)   # of Times Distance was Traveled  --  2   Deviation  --  Ataxic;Decreased Base Of Support;Bradykinetic;Decreased Heel Strike  (hip hike compensation for B drop foot, VC's for step width and posture)   Transfer Functional Level of Assist   Bed, Chair, Wheelchair Transfer Total Assist X 2  (mod-maxA with second person for safety) Moderate Assist  (modA WC>bed to L with L UE on bed rail, second person for safety but not required)   Bed Chair Wheelchair Transfer Description Squat pivot transfer to wheelchair;Supervision for safety;Set-up of equipment;Increased time;Initial preparation for task Increased time;Initial preparation for task;Set-up of equipment;Supervision for safety;Verbal cueing;Squat pivot transfer to wheelchair   Bed Mobility    Supine to Sit Minimal Assist  (Ambar for initial trial then progressed to SBA)  --    Sit to Supine Minimal Assist  (for LEs logroll) Minimal Assist   Sit to " Stand  --  Moderate Assist  (pull to stand // bars vs Arjo)   Scooting Contact Guard Assist  (seated eOM, unable to perform hooklying scooting)  --    Rolling Standby Assist  --    Neuro-Muscular Treatments   Neuro-Muscular Treatments Compensatory Strategies;Co-Contraction;Anterior weight shift;Postural Facilitation;Sequencing;Tactile Cuing;Verbal Cuing;Weight Shift Right;Weight Shift Left  --    Comments sequencing for bed mobility flat bed no bed rails using logroll technique; head/hip ratio for squat pivot transfers with emphasis on loading LEs and controlled vs ballistic movements  --    Interdisciplinary Plan of Care Collaboration   IDT Collaboration with  Physical Therapist Therapy Tech   Patient Position at End of Therapy Seated;Chair Alarm On;Self Releasing Lap Belt Applied;Tray Table within Reach;Call Light within Reach In Bed;Bed Alarm On;Call Light within Reach;Tray Table within Reach   Collaboration Comments POC assist with care     AM session- focused on bed mobility training with logroll, transfer sequencing, and head hip ratio. Educated pt on lateral and push up pressure reliefs to perform for 1 min every 20min while seated in WC. Pt does have intact sensation and gets uncomfortable with extended periods of sitting so he reports self-adjusting. Pt verbalized understanding of pressure relief strategies  Participated in DC planning discussing measurements for bed height and vehicle for DC    PM session- focused on gait training in // bars and with Arjo using East Vector set up. Pt able to walk 10ft in // bars and 40ft with Vector, limited by pain and fatigue.    Assessment    Pt demonstrated improved understanding of mechanics for transfers and bed mobility, though he has a tendency to rush with ballistic movements presenting a safety risk. Pt was more stable in static standing with B UE support in // bars though continues to present with B genu recurvatum despite cueing and demonstration. Pt was able  to initiate gait training in which he did not buckle nor hyperextend either knee with 20% body weight support in Vector harness.  Strengths: Able to follow instructions, Good carryover of learning, Good insight into deficits/needs, Independent prior level of function, Making steady progress towards goals, Motivated for self care and independence, Pleasant and cooperative, Supportive family, Willingly participates in therapeutic activities  Barriers: Bladder retention, Bowel incontinence, Impaired activity tolerance, Impaired balance, Pain, Limited mobility (paraplegia)    Plan    Swap WC (red Ethos lightweight chair with hip guides removed)  - squat pivot transfer training working toward 1 person assist  - WC mobility  - standing frame for glute recruitment and end range sit to stand  - gait east Vector parallel bars progressing to FWW as appropriate (if shoes present consider B AFO vs DF assist ace wrap)  - NRE for B LEs and trunk  - bed mobility regular bed    Passport items to be completed:  Get in/out of bed safely, in/out of a vehicle, safely use mobility device, walk or wheel around home/community, navigate up and down stairs, show how to get up/down from the ground, ensure home is accessible, demonstrate HEP, complete caregiver training    Physical Therapy Problems (Active)       Problem: Balance       Dates: Start:  05/24/23         Goal: STG-Within one week, patient will maintain static standing balance with B UE support and Ambar for 5 minutes to improve endurance and standing tolerance       Dates: Start:  05/24/23    Expected End:  06/08/23               Problem: Mobility       Dates: Start:  05/24/23         Goal: STG-Within one week, patient will propel wheelchair 50ft with SPV       Dates: Start:  05/24/23    Expected End:  06/08/23            Goal: STG-Within one week, patient will ambulate 10ft in parallel bars with maxA and WC follow       Dates: Start:  05/24/23    Expected End:  06/08/23                Problem: Mobility Transfers       Dates: Start:  05/24/23         Goal: STG-Within one week, patient will perform bed mobility with Ambar       Dates: Start:  05/24/23    Expected End:  06/08/23            Goal: STG-Within one week, patient will sit to stand with CGA-Ambar in parallel bars       Dates: Start:  05/24/23    Expected End:  06/08/23            Goal: STG-Within one week, patient will transfer bed to chair with maxA x1 person       Dates: Start:  05/24/23    Expected End:  06/08/23               Problem: PT-Long Term Goals       Dates: Start:  05/24/23         Goal: LTG-By discharge, patient will tolerate standing for 10 min and intermittent UE support with SBA in order to facilitate performance of ADLs       Dates: Start:  05/24/23    Expected End:  06/08/23            Goal: LTG-By discharge, patient will propel wheelchair 150ft Bossman       Dates: Start:  05/24/23    Expected End:  06/08/23            Goal: LTG-By discharge, patient will ambulate 50ft Ambar using LRAD       Dates: Start:  05/24/23    Expected End:  06/08/23            Goal: LTG-By discharge, patient will transfer one surface to another with CGA       Dates: Start:  05/24/23    Expected End:  06/08/23            Goal: LTG-By discharge, patient will perform home exercise program independently       Dates: Start:  05/24/23    Expected End:  06/08/23            Goal: LTG-By discharge, patient will transfer in/out of a car with modA       Dates: Start:  05/24/23    Expected End:  06/08/23            Goal: LTG-By discharge, patient will perform bed mobility independently       Dates: Start:  05/24/23    Expected End:  06/08/23

## 2023-05-27 ENCOUNTER — APPOINTMENT (OUTPATIENT)
Dept: PHYSICAL THERAPY | Facility: REHABILITATION | Age: 43
DRG: 949 | End: 2023-05-27
Attending: PHYSICAL MEDICINE & REHABILITATION
Payer: COMMERCIAL

## 2023-05-27 PROCEDURE — 700102 HCHG RX REV CODE 250 W/ 637 OVERRIDE(OP): Performed by: PHYSICAL MEDICINE & REHABILITATION

## 2023-05-27 PROCEDURE — 97535 SELF CARE MNGMENT TRAINING: CPT

## 2023-05-27 PROCEDURE — 97530 THERAPEUTIC ACTIVITIES: CPT

## 2023-05-27 PROCEDURE — 700102 HCHG RX REV CODE 250 W/ 637 OVERRIDE(OP)

## 2023-05-27 PROCEDURE — 770010 HCHG ROOM/CARE - REHAB SEMI PRIVAT*

## 2023-05-27 PROCEDURE — 700101 HCHG RX REV CODE 250: Performed by: PHYSICAL MEDICINE & REHABILITATION

## 2023-05-27 PROCEDURE — A9270 NON-COVERED ITEM OR SERVICE: HCPCS | Performed by: PHYSICAL MEDICINE & REHABILITATION

## 2023-05-27 PROCEDURE — 700111 HCHG RX REV CODE 636 W/ 250 OVERRIDE (IP): Performed by: PHYSICAL MEDICINE & REHABILITATION

## 2023-05-27 PROCEDURE — A9270 NON-COVERED ITEM OR SERVICE: HCPCS

## 2023-05-27 RX ORDER — GABAPENTIN 400 MG/1
CAPSULE ORAL
Status: ACTIVE
Start: 2023-05-27 | End: 2023-05-28

## 2023-05-27 RX ORDER — GABAPENTIN 100 MG/1
CAPSULE ORAL
Status: COMPLETED
Start: 2023-05-27 | End: 2023-05-27

## 2023-05-27 RX ADMIN — LIDOCAINE HYDROCHLORIDE 3 ML/ML: 20 JELLY TOPICAL at 08:52

## 2023-05-27 RX ADMIN — Medication 2000 UNITS: at 08:49

## 2023-05-27 RX ADMIN — OXYCODONE 5 MG: 5 TABLET ORAL at 05:46

## 2023-05-27 RX ADMIN — MIDODRINE HYDROCHLORIDE 10 MG: 10 TABLET ORAL at 08:45

## 2023-05-27 RX ADMIN — CYCLOBENZAPRINE 10 MG: 10 TABLET, FILM COATED ORAL at 14:24

## 2023-05-27 RX ADMIN — TAMSULOSIN HYDROCHLORIDE 0.8 MG: 0.4 CAPSULE ORAL at 17:42

## 2023-05-27 RX ADMIN — GABAPENTIN 900 MG: 100 CAPSULE ORAL at 14:51

## 2023-05-27 RX ADMIN — ENOXAPARIN SODIUM 40 MG: 100 INJECTION SUBCUTANEOUS at 20:33

## 2023-05-27 RX ADMIN — TIZANIDINE 2 MG: 4 TABLET ORAL at 14:23

## 2023-05-27 RX ADMIN — ATORVASTATIN CALCIUM 20 MG: 10 TABLET, FILM COATED ORAL at 20:34

## 2023-05-27 RX ADMIN — LIDOCAINE HYDROCHLORIDE 2 ML: 20 JELLY TOPICAL at 01:30

## 2023-05-27 RX ADMIN — CYCLOBENZAPRINE 10 MG: 10 TABLET, FILM COATED ORAL at 20:34

## 2023-05-27 RX ADMIN — OMEPRAZOLE 20 MG: 20 CAPSULE, DELAYED RELEASE ORAL at 08:50

## 2023-05-27 RX ADMIN — MIDODRINE HYDROCHLORIDE 10 MG: 10 TABLET ORAL at 17:42

## 2023-05-27 RX ADMIN — MORPHINE SULFATE 30 MG: 30 TABLET, FILM COATED, EXTENDED RELEASE ORAL at 20:34

## 2023-05-27 RX ADMIN — GABAPENTIN 900 MG: 300 CAPSULE ORAL at 20:36

## 2023-05-27 RX ADMIN — TIZANIDINE 2 MG: 4 TABLET ORAL at 20:35

## 2023-05-27 RX ADMIN — OXYCODONE HYDROCHLORIDE 10 MG: 10 TABLET ORAL at 08:50

## 2023-05-27 RX ADMIN — BISACODYL 10 MG: 10 SUPPOSITORY RECTAL at 20:36

## 2023-05-27 RX ADMIN — DULOXETINE HYDROCHLORIDE 60 MG: 30 CAPSULE, DELAYED RELEASE ORAL at 08:50

## 2023-05-27 RX ADMIN — OXYCODONE HYDROCHLORIDE 10 MG: 10 TABLET ORAL at 14:54

## 2023-05-27 RX ADMIN — MIDODRINE HYDROCHLORIDE 10 MG: 10 TABLET ORAL at 11:40

## 2023-05-27 RX ADMIN — DOCUSATE SODIUM 200 MG: 100 CAPSULE, LIQUID FILLED ORAL at 08:51

## 2023-05-27 RX ADMIN — LIDOCAINE HYDROCHLORIDE 2 ML: 20 JELLY TOPICAL at 05:43

## 2023-05-27 RX ADMIN — TIZANIDINE 2 MG: 4 TABLET ORAL at 08:51

## 2023-05-27 RX ADMIN — LIDOCAINE HYDROCHLORIDE 3 ML: 20 JELLY TOPICAL at 17:44

## 2023-05-27 RX ADMIN — GABAPENTIN 900 MG: 300 CAPSULE ORAL at 08:50

## 2023-05-27 RX ADMIN — ACETAMINOPHEN 1000 MG: 500 TABLET ORAL at 20:33

## 2023-05-27 RX ADMIN — ACETAMINOPHEN 1000 MG: 500 TABLET ORAL at 08:49

## 2023-05-27 RX ADMIN — ACETAMINOPHEN 1000 MG: 500 TABLET ORAL at 14:19

## 2023-05-27 RX ADMIN — CYCLOBENZAPRINE 10 MG: 10 TABLET, FILM COATED ORAL at 05:46

## 2023-05-27 RX ADMIN — GABAPENTIN 900 MG: 300 CAPSULE ORAL at 14:51

## 2023-05-27 RX ADMIN — DOCUSATE SODIUM 200 MG: 100 CAPSULE, LIQUID FILLED ORAL at 20:35

## 2023-05-27 RX ADMIN — MULTIPLE VITAMINS W/ MINERALS TAB 1 TABLET: TAB at 11:41

## 2023-05-27 RX ADMIN — LIDOCAINE HYDROCHLORIDE 3 ML: 20 JELLY TOPICAL at 14:26

## 2023-05-27 RX ADMIN — LIDOCAINE 3 PATCH: 50 PATCH CUTANEOUS at 08:48

## 2023-05-27 RX ADMIN — SENNOSIDES 17.2 MG: 8.6 TABLET, FILM COATED ORAL at 11:41

## 2023-05-27 RX ADMIN — LIDOCAINE HYDROCHLORIDE 3 ML: 20 JELLY TOPICAL at 21:02

## 2023-05-27 ASSESSMENT — ACTIVITIES OF DAILY LIVING (ADL)
BED_CHAIR_WHEELCHAIR_TRANSFER_DESCRIPTION: INCREASED TIME;SQUAT PIVOT TRANSFER TO WHEELCHAIR;SUPERVISION FOR SAFETY;VERBAL CUEING;SET-UP OF EQUIPMENT

## 2023-05-27 ASSESSMENT — PAIN DESCRIPTION - PAIN TYPE
TYPE: ACUTE PAIN;SURGICAL PAIN
TYPE: ACUTE PAIN;SURGICAL PAIN

## 2023-05-27 NOTE — PROGRESS NOTES
Transfer Level & Assistive Devices Used: Mod assist with niesha steady    Patient Position: regular toilet     Adaptive Equipment Used? none     Patient Sensation and Bowel Urgency Present? yes     Incontinence? no     BM Results: Large brown/formed    1. Caregiver Present and actively participating in training? no    2. Patient Demonstrated Understanding with Bowel Medications and Purpose: yes     3. Patient Participation with Suppository Placement: yes     4. Patient Participation with Digital Stimulation: yes     5. Patient Participation with Clothing Management: yes     6. Patient Participation with Hygiene: yes        (If patient meets all 6 of the criteria numbered above, consider rescheduling bowel program to evening 1700 or 0500.)      Other:

## 2023-05-27 NOTE — THERAPY
"Occupational Therapy  Daily Treatment     Patient Name: Bharath Diaz  Age:  42 y.o., Sex:  male  Medical Record #: [id number]  Today's Date: 5/27/2023     Precautions  Precautions: Fall Risk, TLSO (Thoracolumbosacral orthosis), Spinal / Back Precautions   Comments: possible R partial infraspinatus tear; R rib 1,8,9 fxs; T11-L1 posterior fusion; L3 AIS B         Subjective    Pt supine in bed with lights off and sleeping upon therapist arrival. Easily aroused and agreeable to OT tx.  \"I've been having trouble feeding myself.\"     Objective       05/26/23 1331   OT Charge Group   OT Self Care / ADL (Units) 2   OT Therapy Activity (Units) 2   OT Total Time Spent   OT Individual Total Time Spent (Mins) 60   Vitals   Pulse 96   Patient BP Position Supine   Blood Pressure 122/81   Pulse Oximetry 95 %   Vitals Comments Sitting BP was 119/80   Pain   Intervention Medication (see MAR);Cold Pack;Emotional Support;Repositioned;Rest   Pain 0 - 10 Group   Location Shoulder   Location Orientation Right   Pain Rating Scale (NPRS) 6   Description Aching   Comfort Goal Comfort with Movement;Perform Activity   Therapist Pain Assessment During Activity   Cognition    Level of Consciousness Alert   Functional Level of Assist   Lower Body Dressing Minimal Assist   Lower Body Dressing Description Assistive devices;Dressing stick;Reacher;Sock aid;Cues for spinal precautions;Increased time;Initial preparation for task;Set-up of equipment;Supervision for safety;Verbal cueing  (Cues for spinal precautions with supine LB dressing with bilateral rolling and use of bed rail. Pt able to manage shorts down only, then used reacher for remaining draw down and rethread. Increased R-shoulder pain with limited use)   Bed, Chair, Wheelchair Transfer Moderate Assist  (Mod A x1 with second person to manage environment, but not required for transfer.)   Bed Chair Wheelchair Transfer Description Increased time;Initial preparation for task;Set-up of " equipment;Supervision for safety;Verbal cueing;Squat pivot transfer to wheelchair   Supine Upper Body Exercises   Supine Upper Body Exercises Yes   Elbow Extension 3 sets of 15;Bilateral;Medium Resistance Theraband   Other Exercise 3 sets of 15;Bilateral;Medium Resistance Theraband; for internal rotation   Comments Theraband attatched to bed rails for use outside of therapy time. pt reporting no pain with these planes of motion.   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 Educated on sleep/wake cycle and increasing light exposure during daylight hours to increase overall sleep at night, which he verbalized understanding.    Briefly reviewed purpose and techniques for pressure relief while seated in w/c, with good carryover demonstrated from PT AM session education.    Once seated in w/c, pt transported to ADL kitchen/BR space to discuss pt's family home setups and need for pictures and measurements to accommodate w/c access to assess DME recommendations. Pt to procure from sister/mother over the weekend.    Made further adjustments to RUE arm tray, bulking up support for increased comfort.    Assessment    Pt continues to make good progress towards functional goals with LB dressing, SB/lateral scoot transfers, and good stabilized BP. Pt was very receptive to all education on sleep/wake cycle and ways to compensate/adjust to increase his overall tolerance to participating in therapy. Pt did report concerns that his sister's house may not be accessible by w/c, but will be procuring pictures.  Strengths: Able to follow  instructions, Alert and oriented, Effective communication skills, Good carryover of learning, Good insight into deficits/needs, Independent prior level of function, Making steady progress towards goals, Manages pain appropriately, Motivated for self care and independence, Pleasant and cooperative, Supportive family, Willingly participates in therapeutic activities  Barriers: Bladder incontinence, Bladder retention, Decreased endurance, Fatigue, Generalized weakness, Impaired activity tolerance, Impaired balance, Limited mobility, Pain    Plan    AE training for LB, sitting balance/functional transfers, BLE NMRE for sensation/strengthening, BUE strengthening without RUE ext rot, begin acquiring pictures and measurements of home setup for DME recommendations.     Passport items to be completed:  Perform bathroom transfers, complete dressing, complete feeding, get ready for the day, prepare a simple meal, participate in household tasks, adapt home for safety needs, demonstrate home exercise program, complete caregiver training     Occupational Therapy Goals (Active)       Problem: Bathing       Dates: Start:  05/24/23         Goal: STG-Within one week, patient will bathe at Min A level using DME/AE PRN.       Dates: Start:  05/24/23    Expected End:  06/08/23               Problem: Dressing       Dates: Start:  05/24/23         Goal: STG-Within one week, patient will dress UB at CGA level including don/doff of TLSO.       Dates: Start:  05/24/23    Expected End:  06/08/23            Goal: STG-Within one week, patient will dress LB at Mod A using AE/compensatory strategies PRN.       Dates: Start:  05/24/23    Expected End:  06/08/23               Problem: Functional Transfers       Dates: Start:  05/24/23         Goal: STG-Within one week, patient will transfer to toilet at Max A x1 using DME/AE PRN.       Dates: Start:  05/24/23    Expected End:  06/08/23               Problem: OT Long Term Goals       Dates: Start:   05/24/23         Goal: LTG-By discharge, patient will complete basic self care tasks at Min A x1-Mod I level using DME/AE PRN.       Dates: Start:  05/24/23    Expected End:  06/08/23            Goal: LTG-By discharge, patient will perform bathroom transfers at Min A x1-Mod I level using DME/AE PRN.       Dates: Start:  05/24/23    Expected End:  06/08/23            Goal: LTG-By discharge, patient will complete basic home management at Min A x1-Mod I level using DME/AE PRN.       Dates: Start:  05/24/23    Expected End:  06/08/23               Problem: Toileting       Dates: Start:  05/24/23         Goal: STG-Within one week, patient will complete toileting tasks at Max A x1 using DME/AE PRN.       Dates: Start:  05/24/23    Expected End:  06/08/23

## 2023-05-27 NOTE — THERAPY
Physical Therapy   Daily Treatment     Patient Name: Bharath Diaz  Age:  42 y.o., Sex:  male  Medical Record #: [id number]  Today's Date: 5/27/2023     Precautions  Precautions: (P) Fall Risk, TLSO (Thoracolumbosacral orthosis), Spinal / Back Precautions   Comments: possible R partial infraspinatus tear; R rib 1,8,9 fxs; T11-L1 posterior fusion; L3 AIS B    Subjective    Pt was supine in bed upon arrival and agreeable to treatment.  Pt reported increased fatigue from the shower this morning.      Objective       05/27/23 1001   PT Charge Group   PT Therapeutic Activities (Units) 4   PT Total Time Spent   PT Individual Total Time Spent (Mins) 60   Precautions   Precautions Fall Risk;TLSO (Thoracolumbosacral orthosis);Spinal / Back Precautions    Transfer Functional Level of Assist   Bed, Chair, Wheelchair Transfer Minimal Assist  (progressing to CGA)   Bed Chair Wheelchair Transfer Description Adaptive equipment;Squat pivot transfer to wheelchair;Set-up of equipment;Supervision for safety;Verbal cueing   Bed Mobility    Supine to Sit Standby Assist   Sit to Stand Contact Guard Assist  (in stand assist lift)   Scooting Standby Assist   Interdisciplinary Plan of Care Collaboration   IDT Collaboration with  Occupational Therapist   Patient Position at End of Therapy Seated;Call Light within Reach;Tray Table within Reach;Phone within Reach   Collaboration Comments CLOF, w/c components     Completed new W/C set up: Ethos K5 ultralightweight with roho hybrid cushion.  Adjusted back rest for improved positioning and comfort and decreased foot plate height for better LE positioning.  Attempted to tighten scissor brakes; however, current rosalind wrenches were unable to reach needed bolt.  Recommend [employer] seating tech to come by on Monday/Tuesday to adjust for better brake position.  Completed w/c mobility training with focus on semicircular technique, turning/maneuvering in tight spaces.     Transfer training completed  "with focus on w/c set up, head/hip relationship, and unweighting lead hip.  Pt able to progress to CGA using SQPT with improved safety and increased hip lift.     Assessment    Pt with good ability to propel Ethos K5 w/c.  Pt would likely benefit from 20\" W chair and shorter depth cushion, but these options were not available in current w/c fleet/cushions.  Pt with improving ability to complete transfers with better forward lean and use of heads/hips relationship.   Strengths: Able to follow instructions, Good carryover of learning, Good insight into deficits/needs, Independent prior level of function, Making steady progress towards goals, Motivated for self care and independence, Pleasant and cooperative, Supportive family, Willingly participates in therapeutic activities  Barriers: Bladder retention, Bowel incontinence, Impaired activity tolerance, Impaired balance, Pain, Limited mobility (paraplegia)    Plan    - squat pivot transfer training working toward 1 person assist  - WC mobility  - standing frame for glute recruitment and end range sit to stand  - gait east Vector parallel bars progressing to FWW as appropriate (if shoes present consider B AFO vs DF assist ace wrap)  - NRE for B LEs and trunk  - bed mobility regular bed     Passport items to be completed:  Get in/out of bed safely, in/out of a vehicle, safely use mobility device, walk or wheel around home/community, navigate up and down stairs, show how to get up/down from the ground, ensure home is accessible, demonstrate HEP, complete caregiver training    Physical Therapy Problems (Active)       Problem: Balance       Dates: Start:  05/24/23         Goal: STG-Within one week, patient will maintain static standing balance with B UE support and Ambar for 5 minutes to improve endurance and standing tolerance       Dates: Start:  05/24/23    Expected End:  06/08/23               Problem: Mobility       Dates: Start:  05/24/23         Goal: STG-Within one " week, patient will propel wheelchair 50ft with SPV       Dates: Start:  05/24/23    Expected End:  06/08/23            Goal: STG-Within one week, patient will ambulate 10ft in parallel bars with maxA and WC follow       Dates: Start:  05/24/23    Expected End:  06/08/23               Problem: Mobility Transfers       Dates: Start:  05/24/23         Goal: STG-Within one week, patient will perform bed mobility with Ambar       Dates: Start:  05/24/23    Expected End:  06/08/23            Goal: STG-Within one week, patient will sit to stand with CGA-Ambar in parallel bars       Dates: Start:  05/24/23    Expected End:  06/08/23            Goal: STG-Within one week, patient will transfer bed to chair with maxA x1 person       Dates: Start:  05/24/23    Expected End:  06/08/23               Problem: PT-Long Term Goals       Dates: Start:  05/24/23         Goal: LTG-By discharge, patient will tolerate standing for 10 min and intermittent UE support with SBA in order to facilitate performance of ADLs       Dates: Start:  05/24/23    Expected End:  06/08/23            Goal: LTG-By discharge, patient will propel wheelchair 150ft Bossman       Dates: Start:  05/24/23    Expected End:  06/08/23            Goal: LTG-By discharge, patient will ambulate 50ft Ambar using LRAD       Dates: Start:  05/24/23    Expected End:  06/08/23            Goal: LTG-By discharge, patient will transfer one surface to another with CGA       Dates: Start:  05/24/23    Expected End:  06/08/23            Goal: LTG-By discharge, patient will perform home exercise program independently       Dates: Start:  05/24/23    Expected End:  06/08/23            Goal: LTG-By discharge, patient will transfer in/out of a car with modA       Dates: Start:  05/24/23    Expected End:  06/08/23            Goal: LTG-By discharge, patient will perform bed mobility independently       Dates: Start:  05/24/23    Expected End:  06/08/23

## 2023-05-27 NOTE — THERAPY
"Occupational Therapy  Daily Treatment     Patient Name: Bharath Diaz  Age:  42 y.o., Sex:  male  Medical Record #: [id number]  Today's Date: 5/27/2023     Precautions  Precautions: Fall Risk, TLSO (Thoracolumbosacral orthosis), Spinal / Back Precautions   Comments: possible R partial infraspinatus tear; R rib 1,8,9 fxs; T11-L1 posterior fusion; L3 AIS B         Subjective    \"I didn't get a shower yesterday.\"     Objective       05/27/23 0701   OT Charge Group   OT Self Care / ADL (Units) 4   OT Therapy Activity (Units) 2   OT Total Time Spent   OT Individual Total Time Spent (Mins) 90   Vitals   Pulse (!) 105  (increased pain levels)   Patient BP Position Sitting   Blood Pressure 119/64   O2 Delivery Device None - Room Air   Vitals Comments Seated on shower bench without TEDS prior to shower.   Pain   Intervention Emotional Support;Distraction;Repositioned;Rest   Pain 0 - 10 Group   Location Back;Shoulder   Location Orientation Mid;Right;Upper;Lower   Pain Rating Scale (NPRS) 7   Description Aching;Constant;Tender   Comfort Goal Comfort with Movement;Perform Activity   Therapist Pain Assessment During Activity  (Pt reporting pain within session, but reduced with wear of TLSO. Pt reported that he was at the tail end of efficacy for his pain medication, due to get more directly following session.)   Cognition    Level of Consciousness Alert   Functional Level of Assist   Eating Modified Independent   Eating Description   (See note for self-feeding evaluation details.)   Bathing Minimal Assist   Bathing Description Adaptive equipment;Grab bar;Hand held shower;Long handled bath tool;Tub bench;Assit with back;Assit with perineal;Increased time;Set-up of equipment;Supervision for safety;Verbal cueing  (Assist to wash rear in supine only. While seated on fold down using HHSH, and LHS. Increased pain noted in back, but did have back support on wall and demonstrated good attention to spinal precautions.)   Upper Body " Dressing Minimal Assist   Upper Body Dressing Description Application of orthotic or brace;Set-up of equipment;Supervision for safety;Verbal cueing;Increased time  (Pt able to don/doff pullover shirt with setup, but did require Mod A for donning of TLSO due to increased back pain. Completed 4x during session between transfers and bed mobility)   Lower Body Dressing Moderate Assist   Lower Body Dressing Description Sock aid;Reacher;Dressing stick;Cues for spinal precautions;Increased time;Initial preparation for task;Set-up of equipment;Supervision for safety;Verbal cueing  (Completed in supine. TA for don/doff of compression socks. Pt able to don/doff socks with VC and increased time supine with elevated HOB. Assist for draw up of shorts following pt's threading with bilateral rolling/bed rail due to increased shoulder pain)   Bed, Chair, Wheelchair Transfer Moderate Assist  (Mod A SB to R-side using towel following shower. Min A to L-side SQPT. Second person present, but not necessary for transfer.)   Bed Chair Wheelchair Transfer Description Increased time;Squat pivot transfer to wheelchair;Supervision for safety;Verbal cueing;Set-up of equipment   Tub / Shower Transfers Moderate Assist  (Min A to L-side SB w/c>fold down bench with towel under pt. Towel now wet following shower and increased pain, Mod A to R-side SB)   Tub Shower Transfer Description Adaptive equipment;Shower bench;Increased time;Set-up of equipment;Verbal cueing;Supervision for safety   Bed Mobility    Supine to Sit Standby Assist   Sit to Supine Minimal Assist   Rolling Standby Assist   Interdisciplinary Plan of Care Collaboration   IDT Collaboration with  Therapy Tech;Nursing   Patient Position at End of Therapy Seated;Self Releasing Lap Belt Applied;Call Light within Reach;Tray Table within Reach   Collaboration Comments Tech assisted with environmental management, Nursing observed all wounds prior to bathing.     Self-feeding Assessment  during meal:    - Ability to grasp utensils: Dynamic tripod grasp (R) and palmar grasp (L)    - Ability to bring food to mouth: Independent (Non-dom L-hand)  - Ability to bring liquids to mouth: Independent  - Ability to open packages: Independent  - Ability to cut food: Independent using bilateral hands, though some increased pain for L-proximal shoulder.  - Ability to manage body posture: Added thicker pillow to w/c due to stretched back support. Per pt report, this was the first time he has had a meal up in w/c since initial admission to hospital.  - Ability to maintain a clean appearance: independent, no spilling  - Behavior/socialization skills: Appropriate    - Primary barriers to self-feeding: Poor positioning if attempting to eat in bed. Discussed being up in w/c for all meals as goal to support independent eating and reduced pain levels.  - Adaptive equipment/ Compensatory strategy recommendations: be up in w/c for all meals.    Pt seen during breakfast meal in OT Self-feeding. Pt was able to eat approximately 100 % of meal and required No assist overall. Pt's response to edu/recommendations: Pt receptive and motivated to eat out of bed for meals.. Pt would continue to benefit from the OT group in order to improve independence and confidence with self-feeding skills.  ?  No need to follow up with self-feeding at this time.    OTR educated pt on the [facility] [place] program. OTR explained each discipline briefly and then explained occupational therapy's role in more detail. OTR answered questions pt had about pt's stay and educated pt on expectations at rehab. Signed off on self-feeding.    Assessment    Pt making significant gains with self-care skills this OT tx day. Pt able to achieve continued improvements in SQPT/SB transfers, though was limited by R-shoulder and back pain during session. Per report, pt was not due to medication and at the end of effective time period, but completely motivated  to participate.   Strengths: Able to follow instructions, Alert and oriented, Effective communication skills, Good carryover of learning, Good insight into deficits/needs, Independent prior level of function, Making steady progress towards goals, Manages pain appropriately, Motivated for self care and independence, Pleasant and cooperative, Supportive family, Willingly participates in therapeutic activities  Barriers: Bladder incontinence, Bladder retention, Decreased endurance, Fatigue, Generalized weakness, Impaired activity tolerance, Impaired balance, Limited mobility, Pain    Plan    AE training for LB, sitting balance/functional transfers, BLE NMRE for sensation/strengthening, BUE strengthening without RUE ext rot, begin acquiring pictures and measurements of home setup for DME recommendations.     Passport items to be completed:  Perform bathroom transfers, complete dressing, complete feeding, get ready for the day, prepare a simple meal, participate in household tasks, adapt home for safety needs, demonstrate home exercise program, complete caregiver training     Occupational Therapy Goals (Active)       Problem: Bathing       Dates: Start:  05/24/23         Goal: STG-Within one week, patient will bathe at Min A level using DME/AE PRN.       Dates: Start:  05/24/23    Expected End:  06/08/23               Problem: Dressing       Dates: Start:  05/24/23         Goal: STG-Within one week, patient will dress UB at CGA level including don/doff of TLSO.       Dates: Start:  05/24/23    Expected End:  06/08/23            Goal: STG-Within one week, patient will dress LB at Mod A using AE/compensatory strategies PRN.       Dates: Start:  05/24/23    Expected End:  06/08/23               Problem: Functional Transfers       Dates: Start:  05/24/23         Goal: STG-Within one week, patient will transfer to toilet at Max A x1 using DME/AE PRN.       Dates: Start:  05/24/23    Expected End:  06/08/23                Problem: OT Long Term Goals       Dates: Start:  05/24/23         Goal: LTG-By discharge, patient will complete basic self care tasks at Min A x1-Mod I level using DME/AE PRN.       Dates: Start:  05/24/23    Expected End:  06/08/23            Goal: LTG-By discharge, patient will perform bathroom transfers at Min A x1-Mod I level using DME/AE PRN.       Dates: Start:  05/24/23    Expected End:  06/08/23            Goal: LTG-By discharge, patient will complete basic home management at Min A x1-Mod I level using DME/AE PRN.       Dates: Start:  05/24/23    Expected End:  06/08/23               Problem: Toileting       Dates: Start:  05/24/23         Goal: STG-Within one week, patient will complete toileting tasks at Max A x1 using DME/AE PRN.       Dates: Start:  05/24/23    Expected End:  06/08/23

## 2023-05-28 ENCOUNTER — APPOINTMENT (OUTPATIENT)
Dept: PHYSICAL THERAPY | Facility: REHABILITATION | Age: 43
DRG: 949 | End: 2023-05-28
Attending: PHYSICAL MEDICINE & REHABILITATION
Payer: COMMERCIAL

## 2023-05-28 PROCEDURE — A9270 NON-COVERED ITEM OR SERVICE: HCPCS | Performed by: PHYSICAL MEDICINE & REHABILITATION

## 2023-05-28 PROCEDURE — 97530 THERAPEUTIC ACTIVITIES: CPT

## 2023-05-28 PROCEDURE — 700102 HCHG RX REV CODE 250 W/ 637 OVERRIDE(OP): Performed by: PHYSICAL MEDICINE & REHABILITATION

## 2023-05-28 PROCEDURE — 97110 THERAPEUTIC EXERCISES: CPT

## 2023-05-28 PROCEDURE — 770010 HCHG ROOM/CARE - REHAB SEMI PRIVAT*

## 2023-05-28 PROCEDURE — 700101 HCHG RX REV CODE 250: Performed by: PHYSICAL MEDICINE & REHABILITATION

## 2023-05-28 PROCEDURE — 700111 HCHG RX REV CODE 636 W/ 250 OVERRIDE (IP): Performed by: PHYSICAL MEDICINE & REHABILITATION

## 2023-05-28 PROCEDURE — 97116 GAIT TRAINING THERAPY: CPT

## 2023-05-28 RX ADMIN — ATORVASTATIN CALCIUM 20 MG: 10 TABLET, FILM COATED ORAL at 21:41

## 2023-05-28 RX ADMIN — TIZANIDINE 2 MG: 4 TABLET ORAL at 14:11

## 2023-05-28 RX ADMIN — OXYCODONE HYDROCHLORIDE 10 MG: 10 TABLET ORAL at 05:52

## 2023-05-28 RX ADMIN — MULTIPLE VITAMINS W/ MINERALS TAB 1 TABLET: TAB at 12:00

## 2023-05-28 RX ADMIN — MIDODRINE HYDROCHLORIDE 10 MG: 10 TABLET ORAL at 12:00

## 2023-05-28 RX ADMIN — LIDOCAINE HYDROCHLORIDE 3 ML: 20 JELLY TOPICAL at 05:53

## 2023-05-28 RX ADMIN — LIDOCAINE HYDROCHLORIDE 3 ML: 20 JELLY TOPICAL at 21:44

## 2023-05-28 RX ADMIN — GABAPENTIN 900 MG: 300 CAPSULE ORAL at 08:33

## 2023-05-28 RX ADMIN — GABAPENTIN 900 MG: 300 CAPSULE ORAL at 21:42

## 2023-05-28 RX ADMIN — MIDODRINE HYDROCHLORIDE 10 MG: 10 TABLET ORAL at 08:34

## 2023-05-28 RX ADMIN — CYCLOBENZAPRINE 10 MG: 10 TABLET, FILM COATED ORAL at 12:04

## 2023-05-28 RX ADMIN — SENNOSIDES 17.2 MG: 8.6 TABLET, FILM COATED ORAL at 12:01

## 2023-05-28 RX ADMIN — LIDOCAINE HYDROCHLORIDE 6 ML: 20 JELLY TOPICAL at 14:14

## 2023-05-28 RX ADMIN — CYCLOBENZAPRINE 10 MG: 10 TABLET, FILM COATED ORAL at 21:41

## 2023-05-28 RX ADMIN — DOCUSATE SODIUM 200 MG: 100 CAPSULE, LIQUID FILLED ORAL at 08:34

## 2023-05-28 RX ADMIN — MIDODRINE HYDROCHLORIDE 10 MG: 10 TABLET ORAL at 17:20

## 2023-05-28 RX ADMIN — DULOXETINE HYDROCHLORIDE 60 MG: 30 CAPSULE, DELAYED RELEASE ORAL at 08:33

## 2023-05-28 RX ADMIN — CYCLOBENZAPRINE 10 MG: 10 TABLET, FILM COATED ORAL at 05:52

## 2023-05-28 RX ADMIN — DOCUSATE SODIUM 200 MG: 100 CAPSULE, LIQUID FILLED ORAL at 21:41

## 2023-05-28 RX ADMIN — Medication 2000 UNITS: at 08:34

## 2023-05-28 RX ADMIN — TIZANIDINE 2 MG: 4 TABLET ORAL at 21:42

## 2023-05-28 RX ADMIN — ACETAMINOPHEN 1000 MG: 500 TABLET ORAL at 08:33

## 2023-05-28 RX ADMIN — GABAPENTIN 900 MG: 300 CAPSULE ORAL at 14:11

## 2023-05-28 RX ADMIN — OXYCODONE HYDROCHLORIDE 10 MG: 10 TABLET ORAL at 12:04

## 2023-05-28 RX ADMIN — TAMSULOSIN HYDROCHLORIDE 0.8 MG: 0.4 CAPSULE ORAL at 17:18

## 2023-05-28 RX ADMIN — LIDOCAINE HYDROCHLORIDE 3 ML: 20 JELLY TOPICAL at 01:45

## 2023-05-28 RX ADMIN — ENOXAPARIN SODIUM 40 MG: 100 INJECTION SUBCUTANEOUS at 21:42

## 2023-05-28 RX ADMIN — MORPHINE SULFATE 30 MG: 30 TABLET, FILM COATED, EXTENDED RELEASE ORAL at 21:42

## 2023-05-28 RX ADMIN — OMEPRAZOLE 20 MG: 20 CAPSULE, DELAYED RELEASE ORAL at 08:34

## 2023-05-28 RX ADMIN — LIDOCAINE 3 PATCH: 50 PATCH CUTANEOUS at 08:41

## 2023-05-28 RX ADMIN — TIZANIDINE 2 MG: 4 TABLET ORAL at 08:34

## 2023-05-28 ASSESSMENT — FIBROSIS 4 INDEX: FIB4 SCORE: 0.84

## 2023-05-28 ASSESSMENT — GAIT ASSESSMENTS
DEVIATION: ATAXIC;DECREASED BASE OF SUPPORT;BRADYKINETIC;DECREASED HEEL STRIKE
ASSISTIVE DEVICE: PARALLEL BARS;OTHER (COMMENTS)
GAIT LEVEL OF ASSIST: TOTAL ASSIST X 2
DISTANCE (FEET): 16

## 2023-05-28 ASSESSMENT — PAIN DESCRIPTION - PAIN TYPE: TYPE: ACUTE PAIN

## 2023-05-28 ASSESSMENT — PAIN SCALES - WONG BAKER: WONGBAKER_NUMERICALRESPONSE: DOESN'T HURT AT ALL

## 2023-05-28 NOTE — THERAPY
Physical Therapy   Daily Treatment     Patient Name: Bharath Diaz  Age:  42 y.o., Sex:  male  Medical Record #: [id number]  Today's Date: 5/28/2023     Precautions  Precautions: Fall Risk, TLSO (Thoracolumbosacral orthosis), Spinal / Back Precautions   Comments: possible R partial infraspinatus tear; R rib 1,8,9 fxs; T11-L1 posterior fusion; L3 AIS B    Subjective    Pt received up in  in hallway with tech, pleasant and agreeable to participate. Requested to work on sit<>stands from new [facility]. Reported that Arjo walker in previous session caused inc pain in his R shoulder, no c/o similar pain during ambulation in // bars this session. Very motivated to participate and visibly sweating by end of session.      Objective       05/28/23 0701   PT Charge Group   PT Gait Training (Units) 2   PT Therapeutic Exercise (Units) 1   PT Therapeutic Activities (Units) 1   PT Total Time Spent   PT Individual Total Time Spent (Mins) 60   Gait Functional Level of Assist    Gait Level Of Assist Total Assist X 2  (Vector with wc follow in // bars)   Assistive Device Parallel Bars;Other (Comments)  (Vector)   Distance (Feet) 16   # of Times Distance was Traveled 2  (plus 8 ft x2 and 5 ft forw/ bwd (see PT note for details))   Deviation Ataxic;Decreased Base Of Support;Bradykinetic;Decreased Heel Strike  (dec coordination in RLE > LLE with no knee buckling noted, VC for inc step width)   Wheelchair Functional Level of Assist   Wheelchair Assist Stand by Assist  (indoors, total A-SBA outdoors depending on level of fatigue and surface incline)   Distance Wheelchair (Feet or Distance) 80  (indoors; plus 150 ft and 100 ft outdoors)   Wheelchair Description Extra time;Leg rest management;Limited by fatigue;Requires incidental assist;Supervision for safety;Verbal cueing  (total A for asc ramps and navigating sidewalk cutout d/t fatigue at end of session)   Stairs Functional Level of Assist   Level of Assist with Stairs Unable to  Participate   Sitting Lower Body Exercises   Sit to Stand   (x20 as warmup CGA-SBA with heavy BUE support and mirror feedback in // bars, provided B knee block but no knee buckling noted)   Bed Mobility    Sit to Stand Contact Guard Assist  (to SBA in // bars)   Scooting Standby Assist  (wc)   Neuro-Muscular Treatments   Neuro-Muscular Treatments Anterior weight shift;Biofeedback;Compensatory Strategies;Facilitation;Joint Approximation;Postural Facilitation;Sequencing;Tactile Cuing;Verbal Cuing   Comments Pregait training in // bars plus Vector (see PT note for details)   Interdisciplinary Plan of Care Collaboration   IDT Collaboration with  Therapy Tech   Patient Position at End of Therapy Seated;Other (Comments)  (pt self propelling back to room with tech in hallway outside main gym)   Collaboration Comments tech assist     Pregait training with heavy UE support in // bars plus Vector and wc follow:  Forw walking x8 ft with 20% unweighting  Bwd walking x5 ft with 20% unweighting and x8 ft with 30% unweighting for improved form  Forw/ bwd walking 16 ft x2 with 23% unweighting  Attempted sidestepping to the L but unable to lift/ advance LLE, ceased activity    Assessment    Pt with good tolerance for session focused on pregait training in // bars. Relies on heavy UE support during sit>stand and ambulation to maintain upright posture. Noted dec coordination in RLE > LLE while walking but no knee buckling in either LE. Benefited from inc unweighting in Vector during retropulsion d/t posterior chain weakness in order to facilitate longer step length.    Strengths: Able to follow instructions, Good carryover of learning, Good insight into deficits/needs, Independent prior level of function, Making steady progress towards goals, Motivated for self care and independence, Pleasant and cooperative, Supportive family, Willingly participates in therapeutic activities  Barriers: Bladder retention, Bowel incontinence, Impaired  activity tolerance, Impaired balance, Pain, Limited mobility (paraplegia)    Plan    - squat pivot transfer training working toward 1 person assist  - WC mobility  - standing frame for glute recruitment and end range sit to stand - consider progressing to sit<>stands with // bars vs FWW  - gait east Vector parallel bars progressing to FWW as appropriate (if shoes present consider B AFO vs DF assist ace wrap)  - NRE for B LEs and trunk  - bed mobility regular bed     Passport items to be completed:  Get in/out of bed safely, in/out of a vehicle, safely use mobility device, walk or wheel around home/community, navigate up and down stairs, show how to get up/down from the ground, ensure home is accessible, demonstrate HEP, complete caregiver training    Physical Therapy Problems (Active)       Problem: Balance       Dates: Start:  05/24/23         Goal: STG-Within one week, patient will maintain static standing balance with B UE support and Ambar for 5 minutes to improve endurance and standing tolerance       Dates: Start:  05/24/23    Expected End:  06/08/23               Problem: Mobility       Dates: Start:  05/24/23         Goal: STG-Within one week, patient will propel wheelchair 50ft with SPV       Dates: Start:  05/24/23    Expected End:  06/08/23            Goal: STG-Within one week, patient will ambulate 10ft in parallel bars with maxA and WC follow       Dates: Start:  05/24/23    Expected End:  06/08/23               Problem: Mobility Transfers       Dates: Start:  05/24/23         Goal: STG-Within one week, patient will perform bed mobility with Ambar       Dates: Start:  05/24/23    Expected End:  06/08/23            Goal: STG-Within one week, patient will sit to stand with CGA-Ambar in parallel bars       Dates: Start:  05/24/23    Expected End:  06/08/23            Goal: STG-Within one week, patient will transfer bed to chair with maxA x1 person       Dates: Start:  05/24/23    Expected End:  06/08/23                Problem: PT-Long Term Goals       Dates: Start:  05/24/23         Goal: LTG-By discharge, patient will tolerate standing for 10 min and intermittent UE support with SBA in order to facilitate performance of ADLs       Dates: Start:  05/24/23    Expected End:  06/08/23            Goal: LTG-By discharge, patient will propel wheelchair 150ft Bossman       Dates: Start:  05/24/23    Expected End:  06/08/23            Goal: LTG-By discharge, patient will ambulate 50ft Ambar using LRAD       Dates: Start:  05/24/23    Expected End:  06/08/23            Goal: LTG-By discharge, patient will transfer one surface to another with CGA       Dates: Start:  05/24/23    Expected End:  06/08/23            Goal: LTG-By discharge, patient will perform home exercise program independently       Dates: Start:  05/24/23    Expected End:  06/08/23            Goal: LTG-By discharge, patient will transfer in/out of a car with modA       Dates: Start:  05/24/23    Expected End:  06/08/23            Goal: LTG-By discharge, patient will perform bed mobility independently       Dates: Start:  05/24/23    Expected End:  06/08/23

## 2023-05-28 NOTE — PROGRESS NOTES
Transfer Level & Assistive Devices Used: Mod A with niesha steady    Patient Position: regular toilet     Adaptive Equipment Used? None     Patient Sensation and Bowel Urgency Present? yes     Incontinence? no     BM Results: Large; brown/soft    1. Caregiver Present and actively participating in training? no    2. Patient Demonstrated Understanding with Bowel Medications and Purpose: yes     3. Patient Participation with Suppository Placement: yes     4. Patient Participation with Digital Stimulation: yes     5. Patient Participation with Clothing Management: yes     6. Patient Participation with Hygiene: yes        (If patient meets all 6 of the criteria numbered above, consider rescheduling bowel program to evening 1700 or 0500.)

## 2023-05-29 ENCOUNTER — APPOINTMENT (OUTPATIENT)
Dept: PHYSICAL THERAPY | Facility: REHABILITATION | Age: 43
DRG: 949 | End: 2023-05-29
Attending: PHYSICAL MEDICINE & REHABILITATION
Payer: COMMERCIAL

## 2023-05-29 PROCEDURE — 770010 HCHG ROOM/CARE - REHAB SEMI PRIVAT*

## 2023-05-29 PROCEDURE — 700101 HCHG RX REV CODE 250: Performed by: PHYSICAL MEDICINE & REHABILITATION

## 2023-05-29 PROCEDURE — A9270 NON-COVERED ITEM OR SERVICE: HCPCS | Performed by: PHYSICAL MEDICINE & REHABILITATION

## 2023-05-29 PROCEDURE — 700102 HCHG RX REV CODE 250 W/ 637 OVERRIDE(OP): Performed by: PHYSICAL MEDICINE & REHABILITATION

## 2023-05-29 PROCEDURE — 97112 NEUROMUSCULAR REEDUCATION: CPT

## 2023-05-29 PROCEDURE — 700111 HCHG RX REV CODE 636 W/ 250 OVERRIDE (IP): Performed by: PHYSICAL MEDICINE & REHABILITATION

## 2023-05-29 PROCEDURE — 97116 GAIT TRAINING THERAPY: CPT

## 2023-05-29 PROCEDURE — 97530 THERAPEUTIC ACTIVITIES: CPT

## 2023-05-29 RX ORDER — BISACODYL 10 MG/1
10 SUPPOSITORY RECTAL
Status: DISCONTINUED | OUTPATIENT
Start: 2023-05-30 | End: 2023-05-30

## 2023-05-29 RX ORDER — SENNOSIDES A AND B 8.6 MG/1
17.2 TABLET, FILM COATED ORAL
Status: DISCONTINUED | OUTPATIENT
Start: 2023-05-30 | End: 2023-05-30

## 2023-05-29 RX ORDER — DOCUSATE SODIUM 100 MG/1
200 CAPSULE, LIQUID FILLED ORAL 2 TIMES DAILY
Status: DISCONTINUED | OUTPATIENT
Start: 2023-05-29 | End: 2023-05-30

## 2023-05-29 RX ADMIN — OXYCODONE HYDROCHLORIDE 10 MG: 10 TABLET ORAL at 05:56

## 2023-05-29 RX ADMIN — CYCLOBENZAPRINE 10 MG: 10 TABLET, FILM COATED ORAL at 21:28

## 2023-05-29 RX ADMIN — TIZANIDINE 2 MG: 4 TABLET ORAL at 08:25

## 2023-05-29 RX ADMIN — GABAPENTIN 900 MG: 300 CAPSULE ORAL at 21:29

## 2023-05-29 RX ADMIN — TIZANIDINE 2 MG: 4 TABLET ORAL at 21:28

## 2023-05-29 RX ADMIN — ENOXAPARIN SODIUM 40 MG: 100 INJECTION SUBCUTANEOUS at 21:27

## 2023-05-29 RX ADMIN — OXYCODONE HYDROCHLORIDE 10 MG: 10 TABLET ORAL at 11:44

## 2023-05-29 RX ADMIN — DULOXETINE HYDROCHLORIDE 60 MG: 30 CAPSULE, DELAYED RELEASE ORAL at 08:25

## 2023-05-29 RX ADMIN — ATORVASTATIN CALCIUM 20 MG: 10 TABLET, FILM COATED ORAL at 21:28

## 2023-05-29 RX ADMIN — CYCLOBENZAPRINE 10 MG: 10 TABLET, FILM COATED ORAL at 13:22

## 2023-05-29 RX ADMIN — LIDOCAINE HYDROCHLORIDE 3 ML: 20 JELLY TOPICAL at 05:51

## 2023-05-29 RX ADMIN — Medication 2000 UNITS: at 08:25

## 2023-05-29 RX ADMIN — LIDOCAINE HYDROCHLORIDE 3 ML: 20 JELLY TOPICAL at 02:09

## 2023-05-29 RX ADMIN — LIDOCAINE HYDROCHLORIDE 1 APPLICATION: 20 JELLY TOPICAL at 22:31

## 2023-05-29 RX ADMIN — LIDOCAINE HYDROCHLORIDE 1 AMPULE: 20 JELLY TOPICAL at 14:08

## 2023-05-29 RX ADMIN — MIDODRINE HYDROCHLORIDE 10 MG: 10 TABLET ORAL at 08:25

## 2023-05-29 RX ADMIN — MULTIPLE VITAMINS W/ MINERALS TAB 1 TABLET: TAB at 11:42

## 2023-05-29 RX ADMIN — TIZANIDINE 2 MG: 4 TABLET ORAL at 14:09

## 2023-05-29 RX ADMIN — TAMSULOSIN HYDROCHLORIDE 0.8 MG: 0.4 CAPSULE ORAL at 17:28

## 2023-05-29 RX ADMIN — MIDODRINE HYDROCHLORIDE 10 MG: 10 TABLET ORAL at 17:28

## 2023-05-29 RX ADMIN — MIDODRINE HYDROCHLORIDE 10 MG: 10 TABLET ORAL at 11:42

## 2023-05-29 RX ADMIN — GABAPENTIN 900 MG: 300 CAPSULE ORAL at 08:24

## 2023-05-29 RX ADMIN — DOCUSATE SODIUM 200 MG: 100 CAPSULE, LIQUID FILLED ORAL at 21:28

## 2023-05-29 RX ADMIN — OMEPRAZOLE 20 MG: 20 CAPSULE, DELAYED RELEASE ORAL at 08:25

## 2023-05-29 RX ADMIN — OXYCODONE HYDROCHLORIDE 10 MG: 10 TABLET ORAL at 18:10

## 2023-05-29 RX ADMIN — DOCUSATE SODIUM 200 MG: 100 CAPSULE, LIQUID FILLED ORAL at 08:25

## 2023-05-29 RX ADMIN — MORPHINE SULFATE 30 MG: 30 TABLET, FILM COATED, EXTENDED RELEASE ORAL at 21:28

## 2023-05-29 RX ADMIN — GABAPENTIN 900 MG: 300 CAPSULE ORAL at 14:09

## 2023-05-29 RX ADMIN — SENNOSIDES 17.2 MG: 8.6 TABLET, FILM COATED ORAL at 11:42

## 2023-05-29 RX ADMIN — CYCLOBENZAPRINE 10 MG: 10 TABLET, FILM COATED ORAL at 05:51

## 2023-05-29 ASSESSMENT — ACTIVITIES OF DAILY LIVING (ADL)
TOILET_TRANSFER_DESCRIPTION: ADAPTIVE EQUIPMENT;GRAB BAR;INCREASED TIME;SET-UP OF EQUIPMENT;SUPERVISION FOR SAFETY;VERBAL CUEING
BED_CHAIR_WHEELCHAIR_TRANSFER_DESCRIPTION: SQUAT PIVOT TRANSFER TO WHEELCHAIR;SUPERVISION FOR SAFETY;VERBAL CUEING;INCREASED TIME;INITIAL PREPARATION FOR TASK
TOILETING_LEVEL_OF_ASSIST_DESCRIPTION: SET-UP OF EQUIPMENT;SUPERVISION FOR SAFETY;VERBAL CUEING
BED_CHAIR_WHEELCHAIR_TRANSFER_DESCRIPTION: INCREASED TIME;INITIAL PREPARATION FOR TASK;SET-UP OF EQUIPMENT;SUPERVISION FOR SAFETY;VERBAL CUEING

## 2023-05-29 ASSESSMENT — PAIN DESCRIPTION - PAIN TYPE
TYPE: ACUTE PAIN

## 2023-05-29 ASSESSMENT — GAIT ASSESSMENTS
ASSISTIVE DEVICE: PARALLEL BARS
DEVIATION: ATAXIC;DECREASED BASE OF SUPPORT;DECREASED HEEL STRIKE
DISTANCE (FEET): 3
GAIT LEVEL OF ASSIST: TOTAL ASSIST X 2
GAIT LEVEL OF ASSIST: TOTAL ASSIST X 2
ASSISTIVE DEVICE: FRONT WHEEL WALKER
DISTANCE (FEET): 15

## 2023-05-29 ASSESSMENT — PAIN SCALES - WONG BAKER: WONGBAKER_NUMERICALRESPONSE: DOESN'T HURT AT ALL

## 2023-05-29 NOTE — THERAPY
Recreational Therapy  Daily Treatment     Patient Name: Bharath Diaz  AGE:  42 y.o., SEX:  male  Medical Record #: [id number]  Today's Date: 5/29/2023       Subjective    Patient ready and agreeable to recreation therapy session.      Objective       05/29/23 1331   Procedural Tracking   Procedural Tracking Community Re-Integration;Community Skills Development;Leisure Skills Awareness;Social Skills Training;Gross Motor Functional Leisure Skills;Fine Motor Functional Leisure Skills;Group Treatment;Cognitive Skills Training;Leisure Skills Development   Treatment Time   Total Time Spent (mins) 30   Total Time Missed 0   Functional Ability Status - Cognitive   Attention Span Remains on Task   Comprehension Follows Two Step Commands;Follows One Step Commands   Judgment Able to Make Independent Decisions   Functional Ability Status - Emotional    Affect Appropriate   Mood Appropriate   Behavior Appropriate;Cooperative   Skilled Intervention    Skilled Intervention Gross Motor Leisure;Fine Motor Leisure;Cognitive Leisure   Skilled Intervention Comments community outing planning   Interdisciplinary Plan of Care Collaboration   IDT Collaboration with  Physical Therapist   Patient Position at End of Therapy Seated;Phone within Reach;Tray Table within Reach;Call Light within Reach   Strengths & Barriers   Strengths Able to follow instructions;Alert and oriented;Effective communication skills;Good carryover of learning;Willingly participates in therapeutic activities;Pleasant and cooperative;Motivated for self care and independence;Making steady progress towards goals   Barriers Decreased endurance;Generalized weakness;Fatigue;Impaired balance;Impaired activity tolerance;Limited mobility         Assessment    Patient participated in planning community reintegration outing for this coming Saturday. Patient and CTRS discusses cathing in public, trialing different mountain bikes, transfers, wc mobility and ambulation on uneven  "surfaces, and needs while in the community.     Strengths: (P) Able to follow instructions, Alert and oriented, Effective communication skills, Good carryover of learning, Willingly participates in therapeutic activities, Pleasant and cooperative, Motivated for self care and independence, Making steady progress towards goals  Barriers: (P) Decreased endurance, Generalized weakness, Fatigue, Impaired balance, Impaired activity tolerance, Limited mobility    Plan    Patient will benefit from continued recreation therapy sessions.     Passport items to be completed:  Verbalize two positive leisure activities, participate in community outing, know how to use \"To Go Kit\", discuss returning to work, community groups or volunteer activities, explore community resources, schedule meeting with peer mentor   "

## 2023-05-29 NOTE — PROGRESS NOTES
Patient refused bowel program. Education regarding the purpose of the bowel program was provided and the risk associated with constipation. Despite education provided, patient still refused bowel program.

## 2023-05-29 NOTE — THERAPY
"Occupational Therapy  Daily Treatment     Patient Name: Bharath Diaz  Age:  42 y.o., Sex:  male  Medical Record #: [id number]  Today's Date: 5/29/2023     Precautions  Precautions: Fall Risk, TLSO (Thoracolumbosacral orthosis), Spinal / Back Precautions   Comments: possible R partial infraspinatus tear; R rib 1,8,9 fxs; T11-L1 posterior fusion; L3 AIS B         Subjective    \"I really want to get my legs going\"     Objective       05/29/23 0931   OT Charge Group   OT Therapy Activity (Units) 2   OT Total Time Spent   OT Individual Total Time Spent (Mins) 30   Sitting Lower Body Exercises   Comments motomed level 2 10 min fwd + 10 min reverse, no rest breaks         Assessment    Pt tolerated session well, attempted to engage pt in strategies to manage BM hygiene however pt declining at this time, will defer to primary OT. Agreeable to trying motomed for LE strengthening, good effort and tolerance.     Strengths: Able to follow instructions, Alert and oriented, Effective communication skills, Good carryover of learning, Good insight into deficits/needs, Independent prior level of function, Making steady progress towards goals, Manages pain appropriately, Motivated for self care and independence, Pleasant and cooperative, Supportive family, Willingly participates in therapeutic activities  Barriers: Bladder incontinence, Bladder retention, Decreased endurance, Fatigue, Generalized weakness, Impaired activity tolerance, Impaired balance, Limited mobility, Pain    Plan    AE training for LB, sitting balance/functional transfers, BLE NMRE for sensation/strengthening, BUE strengthening without RUE ext rot, begin acquiring pictures and measurements of home setup for DME recommendations.     Passport items to be completed:  Perform bathroom transfers, complete dressing, complete feeding, get ready for the day, prepare a simple meal, participate in household tasks, adapt home for safety needs, demonstrate home exercise " program, complete caregiver training     Occupational Therapy Goals (Active)       Problem: Bathing       Dates: Start:  05/24/23         Goal: STG-Within one week, patient will bathe at Min A level using DME/AE PRN.       Dates: Start:  05/24/23    Expected End:  06/08/23               Problem: Dressing       Dates: Start:  05/24/23         Goal: STG-Within one week, patient will dress UB at CGA level including don/doff of TLSO.       Dates: Start:  05/24/23    Expected End:  06/08/23            Goal: STG-Within one week, patient will dress LB at Mod A using AE/compensatory strategies PRN.       Dates: Start:  05/24/23    Expected End:  06/08/23               Problem: Functional Transfers       Dates: Start:  05/24/23         Goal: STG-Within one week, patient will transfer to toilet at Max A x1 using DME/AE PRN.       Dates: Start:  05/24/23    Expected End:  06/08/23               Problem: OT Long Term Goals       Dates: Start:  05/24/23         Goal: LTG-By discharge, patient will complete basic self care tasks at Min A x1-Mod I level using DME/AE PRN.       Dates: Start:  05/24/23    Expected End:  06/08/23            Goal: LTG-By discharge, patient will perform bathroom transfers at Min A x1-Mod I level using DME/AE PRN.       Dates: Start:  05/24/23    Expected End:  06/08/23            Goal: LTG-By discharge, patient will complete basic home management at Min A x1-Mod I level using DME/AE PRN.       Dates: Start:  05/24/23    Expected End:  06/08/23               Problem: Toileting       Dates: Start:  05/24/23         Goal: STG-Within one week, patient will complete toileting tasks at Max A x1 using DME/AE PRN.       Dates: Start:  05/24/23    Expected End:  06/08/23

## 2023-05-29 NOTE — PROGRESS NOTES
Shift received, patient alert and oriented in no acute distress, stated a pain level of 5 at this time. Will continue to monitor.

## 2023-05-29 NOTE — THERAPY
"Physical Therapy   Daily Treatment     Patient Name: Bharath Diaz  Age:  42 y.o., Sex:  male  Medical Record #: [id number]  Today's Date: 5/29/2023     Precautions  Precautions: Fall Risk, TLSO (Thoracolumbosacral orthosis), Spinal / Back Precautions   Comments: possible R partial infraspinatus tear; R rib 1,8,9 fxs; T11-L1 posterior fusion; L3 AIS B    Subjective    \"Let's do it!\" Referring to gait training     Objective       05/29/23 0830   PT Charge Group   PT Gait Training (Units) 1   PT Neuromuscular Re-Education / Balance (Units) 1   PT Therapeutic Activities (Units) 1   PT Total Time Spent   PT Individual Total Time Spent (Mins) 45   Gait Functional Level of Assist    Gait Level Of Assist Total Assist X 2  (Ambar with close WC follow)   Assistive Device Parallel Bars   Distance (Feet) 15   # of Times Distance was Traveled 3  (foward and backward ambulation)   Deviation Ataxic;Decreased Base Of Support;Decreased Heel Strike  (B DF assist ace wrap, heavy use of UEs, B knee block however no buckling, excessive R hip ER)   Wheelchair Functional Level of Assist   Wheelchair Assist Supervised   Distance Wheelchair (Feet or Distance) 150  (level ground indoors)   Wheelchair Description Extra time;Limited by fatigue;Verbal cueing;Supervision for safety   Transfer Functional Level of Assist   Bed, Chair, Wheelchair Transfer Minimal Assist   Bed Chair Wheelchair Transfer Description Squat pivot transfer to wheelchair;Supervision for safety;Verbal cueing;Increased time;Initial preparation for task  (WC<>mat table, 2nd person stabilizing WC due to poor brakes)   Toilet Transfers Moderate Assist   Toilet Transfer Description Adaptive equipment;Grab bar;Increased time;Set-up of equipment;Supervision for safety;Verbal cueing  (stand pivot with GB to regular toilet)   Sitting Lower Body Exercises   Sit to Stand   (2 sets of 7 from varying mat heights, focus on eccentric lowering and decreased use of UEs)   Bed Mobility  "   Sit to Stand Contact Guard Assist  (// bars vs FWW)   Interdisciplinary Plan of Care Collaboration   IDT Collaboration with  Occupational Therapist;Therapy Tech   Patient Position at End of Therapy Seated  (handoff to RN to complete toileting)   Collaboration Comments assist with care, progress in therapy     Session terminated early due to pt needing extra time to have a BM. Additional time added to afternoon session       05/29/23 1245   PT Charge Group   PT Gait Training (Units) 2   PT Neuromuscular Re-Education / Balance (Units) 1   PT Total Time Spent   PT Individual Total Time Spent (Mins) 45   Gait Functional Level of Assist    Gait Level Of Assist Total Assist X 2  (modA with WC follow)   Assistive Device Front Wheel Walker   Distance (Feet) 3   # of Times Distance was Traveled 1   Deviation Antalgic;Decreased Base Of Support;Step To;Bradykinetic;Decreased Heel Strike  (B DF assist ace wrap, unstable trunk A/P direction, heavy use of UEs)   Transfer Functional Level of Assist   Bed, Chair, Wheelchair Transfer Moderate Assist   Bed Chair Wheelchair Transfer Description Increased time;Initial preparation for task;Set-up of equipment;Supervision for safety;Verbal cueing  (stand step vs stand pivot FWW)   Sitting Lower Body Exercises   Sit to Stand 2 sets of 10  (elevated bed)   Bed Mobility    Supine to Sit Standby Assist   Sit to Stand Minimal Assist  (modA from low bed side chair, Ambar from WC)   Neuro-Muscular Treatments   Neuro-Muscular Treatments Anterior weight shift;Co-Contraction;Postural Facilitation;Tactile Cuing;Verbal Cuing;Sequencing   Comments sit to stands FWW, transfer training FWW, pregait training FWW with WC follow   Interdisciplinary Plan of Care Collaboration   IDT Collaboration with  Therapy Tech   Patient Position at End of Therapy Seated;Call Light within Reach;Tray Table within Reach   Collaboration Comments assist with care     Pt able to librado TLSO with Ambar sitting EOB.   Trialed B  anterior AFOs due to lack of selection in the facility however they resulted in B knee hyperextension in increased in trunk instability  Completed postural re-ed in static standing with FWW working on abdominal and glute co-contraction  Also completed seated LAQ qith 3lb ankle weight x15 reps ea    Assessment    Pt was able to progress to gait training without external bodyweight support in the parallel bars today. He initiated gait training with FWW in the afternoon but was limited by fatigue and fear of falling. Pt is very motivated to participate in therapy and receptive to feedback.  Strengths: Able to follow instructions, Good carryover of learning, Good insight into deficits/needs, Independent prior level of function, Making steady progress towards goals, Motivated for self care and independence, Pleasant and cooperative, Supportive family, Willingly participates in therapeutic activities  Barriers: Bladder retention, Bowel incontinence, Impaired activity tolerance, Impaired balance, Pain, Limited mobility (paraplegia)    Plan    -call [employer] to alter WC (adjust brakes, raise foot plate, swap for larger seatbelt)  - WC mobility indoors and outdoors  - sit<>stands FWW  - gait // bars>>FWW with B DF assist acewrap  - NRE for B LEs and trunk  - bed mobility regular bed     Passport items to be completed:  Get in/out of bed safely, in/out of a vehicle, safely use mobility device, walk or wheel around home/community, navigate up and down stairs, show how to get up/down from the ground, ensure home is accessible, demonstrate HEP, complete caregiver training        Physical Therapy Problems (Active)       Problem: Balance       Dates: Start:  05/24/23         Goal: STG-Within one week, patient will maintain static standing balance with B UE support and Ambar for 5 minutes to improve endurance and standing tolerance       Dates: Start:  05/24/23    Expected End:  06/08/23               Problem: Mobility       Dates:  Start:  05/24/23         Goal: STG-Within one week, patient will propel wheelchair 50ft with SPV       Dates: Start:  05/24/23    Expected End:  06/08/23            Goal: STG-Within one week, patient will ambulate 10ft in parallel bars with maxA and WC follow       Dates: Start:  05/24/23    Expected End:  06/08/23               Problem: Mobility Transfers       Dates: Start:  05/24/23         Goal: STG-Within one week, patient will perform bed mobility with Ambar       Dates: Start:  05/24/23    Expected End:  06/08/23            Goal: STG-Within one week, patient will sit to stand with CGA-Ambar in parallel bars       Dates: Start:  05/24/23    Expected End:  06/08/23            Goal: STG-Within one week, patient will transfer bed to chair with maxA x1 person       Dates: Start:  05/24/23    Expected End:  06/08/23               Problem: PT-Long Term Goals       Dates: Start:  05/24/23         Goal: LTG-By discharge, patient will tolerate standing for 10 min and intermittent UE support with SBA in order to facilitate performance of ADLs       Dates: Start:  05/24/23    Expected End:  06/08/23            Goal: LTG-By discharge, patient will propel wheelchair 150ft Bossman       Dates: Start:  05/24/23    Expected End:  06/08/23            Goal: LTG-By discharge, patient will ambulate 50ft Ambar using LRAD       Dates: Start:  05/24/23    Expected End:  06/08/23            Goal: LTG-By discharge, patient will transfer one surface to another with CGA       Dates: Start:  05/24/23    Expected End:  06/08/23            Goal: LTG-By discharge, patient will perform home exercise program independently       Dates: Start:  05/24/23    Expected End:  06/08/23            Goal: LTG-By discharge, patient will transfer in/out of a car with modA       Dates: Start:  05/24/23    Expected End:  06/08/23            Goal: LTG-By discharge, patient will perform bed mobility independently       Dates: Start:  05/24/23    Expected End:   06/08/23

## 2023-05-29 NOTE — THERAPY
"Occupational Therapy  Daily Treatment     Patient Name: Bharath Diaz  Age:  42 y.o., Sex:  male  Medical Record #: [id number]  Today's Date: 5/29/2023     Precautions  Precautions: Fall Risk, TLSO (Thoracolumbosacral orthosis), Spinal / Back Precautions   Comments: possible R partial infraspinatus tear; R rib 1,8,9 fxs; T11-L1 posterior fusion; L3 AIS B         Subjective    \"I've been crapping between 9 and 10 the past two days.\" Pt's bowel program has been scheduled at 8PM, with him refusing last night. Pt expressed that he would like to change the time of his bowel program to match his body's current cycle. Sharita sent to Dr. Sahni, Dr. Hunt (current covering doctor for this tx day), RN, and primary PT.      Objective       05/29/23 1031   OT Charge Group   OT Therapy Activity (Units) 4   OT Total Time Spent   OT Individual Total Time Spent (Mins) 60   Pain   Intervention Rest;Emotional Support   Pain 0 - 10 Group   Location Shoulder   Location Orientation Right   Pain Rating Scale (NPRS)   (No numeric given, grimising.)   Description Aching;Sore   Comfort Goal Comfort with Movement;Perform Activity   Therapist Pain Assessment During Activity   Cognition    Level of Consciousness Alert   Functional Level of Assist   Toilet Transfers Moderate Assist  (Mod A x1 with second person present, but not necessary.)   Toilet Transfer Description Adaptive equipment;Grab bar;Increased time;Initial preparation for task;Set-up of equipment;Supervision for safety;Verbal cueing;Verbal cues for spinal precautions  (Mod A Stand pivot with offset foot placement (advanced to sitting surface with lead foot). GB, L-knee blocking, and gait belt (no clothing management). Using padded drop arm commode set to 21 inches high.)   Interdisciplinary Plan of Care Collaboration   IDT Collaboration with  Therapy Tech;Certified Nursing Assistant;Physical Therapist   Patient Position at End of Therapy Seated   Collaboration Comments Therapy " tech assisted with environmental management and present for transfers, but no hands on required. CNA notified that pt was left just out of reach of call light/self-cathing at bedside and to check on him in 5 min. PT re: CLOF, POC.     AE/DME - Dry demonstration and education completed for toilet hygiene devices including Juvo, Bottom Deshawn, Freedom Wand, and 2 different models of toileting tongs, as well as a portable bidet. Pt with hands on dry trial for each, preferring Juvo and portable bidet.   Observed pictures of pt's mother's home setup for BR only, without measurements. Without measurements, OTR/L was unable to make educated DME recommendations at this time. Requested more pictures and measurements for tomorrow from pt.    Assessment    Pt continues to make good progress towards goals with functional transfers and was very receptive and motivated by toilet hygiene device education. He may benefit from further education with suppository devices as well. Pt also making good progress with ICP with setup in sitting this day. Pt had previously been completing in supine in bed, with this being his first, and successful attempt at end of session seated in w/c.  Strengths: Able to follow instructions, Alert and oriented, Effective communication skills, Good carryover of learning, Good insight into deficits/needs, Independent prior level of function, Making steady progress towards goals, Manages pain appropriately, Motivated for self care and independence, Pleasant and cooperative, Supportive family, Willingly participates in therapeutic activities  Barriers: Bladder incontinence, Bladder retention, Decreased endurance, Fatigue, Generalized weakness, Impaired activity tolerance, Impaired balance, Limited mobility, Pain    Plan    AE training for LB, sitting balance/functional transfers, BLE NMRE for sensation/strengthening, BUE strengthening without RUE ext rot, begin acquiring pictures and measurements of home setup  for DME recommendations.     Passport items to be completed:  Perform bathroom transfers, complete dressing, complete feeding, get ready for the day, prepare a simple meal, participate in household tasks, adapt home for safety needs, demonstrate home exercise program, complete caregiver training     Occupational Therapy Goals (Active)       Problem: Bathing       Dates: Start:  05/24/23         Goal: STG-Within one week, patient will bathe at Min A level using DME/AE PRN.       Dates: Start:  05/24/23    Expected End:  06/08/23               Problem: Dressing       Dates: Start:  05/24/23         Goal: STG-Within one week, patient will dress UB at CGA level including don/doff of TLSO.       Dates: Start:  05/24/23    Expected End:  06/08/23            Goal: STG-Within one week, patient will dress LB at Mod A using AE/compensatory strategies PRN.       Dates: Start:  05/24/23    Expected End:  06/08/23               Problem: Functional Transfers       Dates: Start:  05/24/23         Goal: STG-Within one week, patient will transfer to toilet at Max A x1 using DME/AE PRN.       Dates: Start:  05/24/23    Expected End:  06/08/23               Problem: OT Long Term Goals       Dates: Start:  05/24/23         Goal: LTG-By discharge, patient will complete basic self care tasks at Min A x1-Mod I level using DME/AE PRN.       Dates: Start:  05/24/23    Expected End:  06/08/23            Goal: LTG-By discharge, patient will perform bathroom transfers at Min A x1-Mod I level using DME/AE PRN.       Dates: Start:  05/24/23    Expected End:  06/08/23            Goal: LTG-By discharge, patient will complete basic home management at Min A x1-Mod I level using DME/AE PRN.       Dates: Start:  05/24/23    Expected End:  06/08/23               Problem: Toileting       Dates: Start:  05/24/23         Goal: STG-Within one week, patient will complete toileting tasks at Max A x1 using DME/AE PRN.       Dates: Start:  05/24/23    Expected  End:  06/08/23

## 2023-05-30 ENCOUNTER — APPOINTMENT (OUTPATIENT)
Dept: OCCUPATIONAL THERAPY | Facility: REHABILITATION | Age: 43
End: 2023-05-30
Attending: PHYSICAL MEDICINE & REHABILITATION
Payer: COMMERCIAL

## 2023-05-30 ENCOUNTER — APPOINTMENT (OUTPATIENT)
Dept: RADIOLOGY | Facility: REHABILITATION | Age: 43
DRG: 949 | End: 2023-05-30
Attending: PHYSICAL MEDICINE & REHABILITATION
Payer: COMMERCIAL

## 2023-05-30 ENCOUNTER — APPOINTMENT (OUTPATIENT)
Dept: PHYSICAL THERAPY | Facility: REHABILITATION | Age: 43
DRG: 949 | End: 2023-05-30
Attending: PHYSICAL MEDICINE & REHABILITATION
Payer: COMMERCIAL

## 2023-05-30 LAB
ALBUMIN SERPL BCP-MCNC: 3.6 G/DL (ref 3.2–4.9)
ALBUMIN/GLOB SERPL: 1.1 G/DL
ALP SERPL-CCNC: 113 U/L (ref 30–99)
ALT SERPL-CCNC: 32 U/L (ref 2–50)
ANION GAP SERPL CALC-SCNC: 10 MMOL/L (ref 7–16)
AST SERPL-CCNC: 20 U/L (ref 12–45)
BASOPHILS # BLD AUTO: 0.7 % (ref 0–1.8)
BASOPHILS # BLD: 0.05 K/UL (ref 0–0.12)
BILIRUB SERPL-MCNC: 0.7 MG/DL (ref 0.1–1.5)
BUN SERPL-MCNC: 16 MG/DL (ref 8–22)
CALCIUM ALBUM COR SERPL-MCNC: 8.9 MG/DL (ref 8.5–10.5)
CALCIUM SERPL-MCNC: 8.6 MG/DL (ref 8.5–10.5)
CHLORIDE SERPL-SCNC: 100 MMOL/L (ref 96–112)
CO2 SERPL-SCNC: 26 MMOL/L (ref 20–33)
CREAT SERPL-MCNC: 0.81 MG/DL (ref 0.5–1.4)
EOSINOPHIL # BLD AUTO: 0.1 K/UL (ref 0–0.51)
EOSINOPHIL NFR BLD: 1.3 % (ref 0–6.9)
ERYTHROCYTE [DISTWIDTH] IN BLOOD BY AUTOMATED COUNT: 50.7 FL (ref 35.9–50)
GFR SERPLBLD CREATININE-BSD FMLA CKD-EPI: 112 ML/MIN/1.73 M 2
GLOBULIN SER CALC-MCNC: 3.2 G/DL (ref 1.9–3.5)
GLUCOSE SERPL-MCNC: 102 MG/DL (ref 65–99)
HCT VFR BLD AUTO: 36.1 % (ref 42–52)
HGB BLD-MCNC: 11.5 G/DL (ref 14–18)
IMM GRANULOCYTES # BLD AUTO: 0.07 K/UL (ref 0–0.11)
IMM GRANULOCYTES NFR BLD AUTO: 0.9 % (ref 0–0.9)
LYMPHOCYTES # BLD AUTO: 2.17 K/UL (ref 1–4.8)
LYMPHOCYTES NFR BLD: 28.6 % (ref 22–41)
MCH RBC QN AUTO: 30.2 PG (ref 27–33)
MCHC RBC AUTO-ENTMCNC: 31.9 G/DL (ref 32.3–36.5)
MCV RBC AUTO: 94.8 FL (ref 81.4–97.8)
MONOCYTES # BLD AUTO: 0.69 K/UL (ref 0–0.85)
MONOCYTES NFR BLD AUTO: 9.1 % (ref 0–13.4)
NEUTROPHILS # BLD AUTO: 4.5 K/UL (ref 1.82–7.42)
NEUTROPHILS NFR BLD: 59.4 % (ref 44–72)
NRBC # BLD AUTO: 0 K/UL
NRBC BLD-RTO: 0 /100 WBC (ref 0–0.2)
PLATELET # BLD AUTO: 480 K/UL (ref 164–446)
PMV BLD AUTO: 9.5 FL (ref 9–12.9)
POTASSIUM SERPL-SCNC: 4 MMOL/L (ref 3.6–5.5)
PROT SERPL-MCNC: 6.8 G/DL (ref 6–8.2)
RBC # BLD AUTO: 3.81 M/UL (ref 4.7–6.1)
SODIUM SERPL-SCNC: 136 MMOL/L (ref 135–145)
WBC # BLD AUTO: 7.6 K/UL (ref 4.8–10.8)

## 2023-05-30 PROCEDURE — 97112 NEUROMUSCULAR REEDUCATION: CPT

## 2023-05-30 PROCEDURE — 74018 RADEX ABDOMEN 1 VIEW: CPT

## 2023-05-30 PROCEDURE — A9270 NON-COVERED ITEM OR SERVICE: HCPCS | Performed by: PHYSICAL MEDICINE & REHABILITATION

## 2023-05-30 PROCEDURE — 700101 HCHG RX REV CODE 250: Performed by: PHYSICAL MEDICINE & REHABILITATION

## 2023-05-30 PROCEDURE — 700102 HCHG RX REV CODE 250 W/ 637 OVERRIDE(OP): Performed by: PHYSICAL MEDICINE & REHABILITATION

## 2023-05-30 PROCEDURE — 97530 THERAPEUTIC ACTIVITIES: CPT

## 2023-05-30 PROCEDURE — A9270 NON-COVERED ITEM OR SERVICE: HCPCS

## 2023-05-30 PROCEDURE — 85025 COMPLETE CBC W/AUTO DIFF WBC: CPT

## 2023-05-30 PROCEDURE — 97116 GAIT TRAINING THERAPY: CPT

## 2023-05-30 PROCEDURE — 770010 HCHG ROOM/CARE - REHAB SEMI PRIVAT*

## 2023-05-30 PROCEDURE — 97110 THERAPEUTIC EXERCISES: CPT

## 2023-05-30 PROCEDURE — 80053 COMPREHEN METABOLIC PANEL: CPT

## 2023-05-30 PROCEDURE — 700111 HCHG RX REV CODE 636 W/ 250 OVERRIDE (IP): Performed by: PHYSICAL MEDICINE & REHABILITATION

## 2023-05-30 PROCEDURE — 97533 SENSORY INTEGRATION: CPT

## 2023-05-30 PROCEDURE — 700102 HCHG RX REV CODE 250 W/ 637 OVERRIDE(OP)

## 2023-05-30 PROCEDURE — 36415 COLL VENOUS BLD VENIPUNCTURE: CPT

## 2023-05-30 PROCEDURE — 97535 SELF CARE MNGMENT TRAINING: CPT

## 2023-05-30 PROCEDURE — 99232 SBSQ HOSP IP/OBS MODERATE 35: CPT | Performed by: PHYSICAL MEDICINE & REHABILITATION

## 2023-05-30 RX ORDER — DOCUSATE SODIUM 100 MG/1
200 CAPSULE, LIQUID FILLED ORAL 2 TIMES DAILY
Status: DISCONTINUED | OUTPATIENT
Start: 2023-05-30 | End: 2023-05-30

## 2023-05-30 RX ORDER — BISACODYL 10 MG
SUPPOSITORY, RECTAL RECTAL
Status: COMPLETED
Start: 2023-05-30 | End: 2023-05-30

## 2023-05-30 RX ORDER — SENNOSIDES A AND B 8.6 MG/1
17.2 TABLET, FILM COATED ORAL
Status: DISCONTINUED | OUTPATIENT
Start: 2023-05-30 | End: 2023-05-30

## 2023-05-30 RX ORDER — BISACODYL 10 MG/1
10 SUPPOSITORY RECTAL
Status: DISCONTINUED | OUTPATIENT
Start: 2023-05-31 | End: 2023-05-30

## 2023-05-30 RX ORDER — MIDODRINE HYDROCHLORIDE 2.5 MG/1
5 TABLET ORAL
Status: DISCONTINUED | OUTPATIENT
Start: 2023-05-30 | End: 2023-06-02

## 2023-05-30 RX ORDER — DOCUSATE SODIUM 100 MG/1
200 CAPSULE, LIQUID FILLED ORAL 2 TIMES DAILY
Status: DISCONTINUED | OUTPATIENT
Start: 2023-05-30 | End: 2023-06-08 | Stop reason: HOSPADM

## 2023-05-30 RX ORDER — BISACODYL 10 MG/1
10 SUPPOSITORY RECTAL
Status: DISCONTINUED | OUTPATIENT
Start: 2023-05-31 | End: 2023-06-08 | Stop reason: HOSPADM

## 2023-05-30 RX ORDER — SENNOSIDES A AND B 8.6 MG/1
25.8 TABLET, FILM COATED ORAL
Status: DISCONTINUED | OUTPATIENT
Start: 2023-05-30 | End: 2023-06-08 | Stop reason: HOSPADM

## 2023-05-30 RX ORDER — CYCLOBENZAPRINE HCL 10 MG
5 TABLET ORAL 3 TIMES DAILY
Status: DISCONTINUED | OUTPATIENT
Start: 2023-05-30 | End: 2023-05-31

## 2023-05-30 RX ORDER — TESTOSTERONE GEL, 1% 10 MG/G
100 GEL TRANSDERMAL DAILY
Status: DISCONTINUED | OUTPATIENT
Start: 2023-05-31 | End: 2023-06-08 | Stop reason: HOSPADM

## 2023-05-30 RX ORDER — TIZANIDINE 4 MG/1
4 TABLET ORAL 3 TIMES DAILY
Status: DISCONTINUED | OUTPATIENT
Start: 2023-05-30 | End: 2023-06-08 | Stop reason: HOSPADM

## 2023-05-30 RX ORDER — TESTOSTERONE GEL, 1% 10 MG/G
50 GEL TRANSDERMAL DAILY
Status: DISCONTINUED | OUTPATIENT
Start: 2023-05-30 | End: 2023-05-30

## 2023-05-30 RX ORDER — BISACODYL 10 MG/1
SUPPOSITORY RECTAL
Status: COMPLETED
Start: 2023-05-30 | End: 2023-05-30

## 2023-05-30 RX ADMIN — LIDOCAINE 3 PATCH: 50 PATCH CUTANEOUS at 08:19

## 2023-05-30 RX ADMIN — GABAPENTIN 900 MG: 300 CAPSULE ORAL at 15:21

## 2023-05-30 RX ADMIN — Medication 2000 UNITS: at 08:17

## 2023-05-30 RX ADMIN — OXYCODONE HYDROCHLORIDE 10 MG: 10 TABLET ORAL at 11:56

## 2023-05-30 RX ADMIN — BISACODYL 10 MG: 10 SUPPOSITORY RECTAL at 05:36

## 2023-05-30 RX ADMIN — ATORVASTATIN CALCIUM 20 MG: 10 TABLET, FILM COATED ORAL at 21:00

## 2023-05-30 RX ADMIN — LIDOCAINE HYDROCHLORIDE 2 %: 20 JELLY TOPICAL at 17:24

## 2023-05-30 RX ADMIN — GABAPENTIN 900 MG: 300 CAPSULE ORAL at 21:00

## 2023-05-30 RX ADMIN — GABAPENTIN 900 MG: 300 CAPSULE ORAL at 08:17

## 2023-05-30 RX ADMIN — OXYCODONE HYDROCHLORIDE 10 MG: 10 TABLET ORAL at 17:24

## 2023-05-30 RX ADMIN — TIZANIDINE 2 MG: 4 TABLET ORAL at 08:21

## 2023-05-30 RX ADMIN — OXYCODONE HYDROCHLORIDE 10 MG: 10 TABLET ORAL at 05:22

## 2023-05-30 RX ADMIN — MIDODRINE HYDROCHLORIDE 10 MG: 10 TABLET ORAL at 11:42

## 2023-05-30 RX ADMIN — CYCLOBENZAPRINE 5 MG: 10 TABLET, FILM COATED ORAL at 21:00

## 2023-05-30 RX ADMIN — CYCLOBENZAPRINE 10 MG: 10 TABLET, FILM COATED ORAL at 05:19

## 2023-05-30 RX ADMIN — DOCUSATE SODIUM 200 MG: 100 CAPSULE, LIQUID FILLED ORAL at 08:18

## 2023-05-30 RX ADMIN — OMEPRAZOLE 20 MG: 20 CAPSULE, DELAYED RELEASE ORAL at 08:17

## 2023-05-30 RX ADMIN — MIDODRINE HYDROCHLORIDE 5 MG: 2.5 TABLET ORAL at 17:15

## 2023-05-30 RX ADMIN — SENNOSIDES 25.8 MG: 8.6 TABLET, FILM COATED ORAL at 21:00

## 2023-05-30 RX ADMIN — BISACODYL 10 MG: 10 SUPPOSITORY RECTAL at 09:00

## 2023-05-30 RX ADMIN — CYCLOBENZAPRINE 10 MG: 10 TABLET, FILM COATED ORAL at 13:06

## 2023-05-30 RX ADMIN — MORPHINE SULFATE 30 MG: 30 TABLET, FILM COATED, EXTENDED RELEASE ORAL at 21:00

## 2023-05-30 RX ADMIN — MIDODRINE HYDROCHLORIDE 10 MG: 10 TABLET ORAL at 08:17

## 2023-05-30 RX ADMIN — MULTIPLE VITAMINS W/ MINERALS TAB 1 TABLET: TAB at 11:42

## 2023-05-30 RX ADMIN — DOCUSATE SODIUM 200 MG: 100 CAPSULE, LIQUID FILLED ORAL at 21:00

## 2023-05-30 RX ADMIN — TAMSULOSIN HYDROCHLORIDE 0.8 MG: 0.4 CAPSULE ORAL at 17:15

## 2023-05-30 RX ADMIN — DULOXETINE HYDROCHLORIDE 60 MG: 30 CAPSULE, DELAYED RELEASE ORAL at 08:18

## 2023-05-30 RX ADMIN — TIZANIDINE 4 MG: 4 TABLET ORAL at 21:00

## 2023-05-30 RX ADMIN — OXYCODONE HYDROCHLORIDE 10 MG: 10 TABLET ORAL at 08:24

## 2023-05-30 RX ADMIN — ENOXAPARIN SODIUM 40 MG: 100 INJECTION SUBCUTANEOUS at 20:59

## 2023-05-30 RX ADMIN — TIZANIDINE 4 MG: 4 TABLET ORAL at 15:20

## 2023-05-30 ASSESSMENT — ACTIVITIES OF DAILY LIVING (ADL)
TOILETING_LEVEL_OF_ASSIST_DESCRIPTION: ASSIST FOR HYGIENE;ASSIST TO PULL PANTS UP;ASSIST TO PULL PANTS DOWN;ASSIST FOR STANDING BALANCE;GRAB BAR;INCREASED TIME;INITIAL PREPARATION FOR TASK;SET-UP OF EQUIPMENT;SUPERVISION FOR SAFETY;VERBAL CUEING
TUB_SHOWER_TRANSFER_DESCRIPTION: ADAPTIVE EQUIPMENT;SHOWER BENCH;INCREASED TIME;SET-UP OF EQUIPMENT;SUPERVISION FOR SAFETY;VERBAL CUEING
BED_CHAIR_WHEELCHAIR_TRANSFER_DESCRIPTION: INCREASED TIME;INITIAL PREPARATION FOR TASK;SET-UP OF EQUIPMENT;SUPERVISION FOR SAFETY;VERBAL CUEING
BED_CHAIR_WHEELCHAIR_TRANSFER_DESCRIPTION: SQUAT PIVOT TRANSFER TO WHEELCHAIR;SUPERVISION FOR SAFETY;VERBAL CUEING;SET-UP OF EQUIPMENT

## 2023-05-30 ASSESSMENT — PATIENT HEALTH QUESTIONNAIRE - PHQ9
2. FEELING DOWN, DEPRESSED, IRRITABLE, OR HOPELESS: NOT AT ALL
1. LITTLE INTEREST OR PLEASURE IN DOING THINGS: NOT AT ALL
SUM OF ALL RESPONSES TO PHQ9 QUESTIONS 1 AND 2: 0

## 2023-05-30 ASSESSMENT — GAIT ASSESSMENTS
DISTANCE (FEET): 40
ASSISTIVE DEVICE: FRONT WHEEL WALKER
GAIT LEVEL OF ASSIST: TOTAL ASSIST X 2
DEVIATION: ATAXIC;DECREASED BASE OF SUPPORT

## 2023-05-30 ASSESSMENT — PAIN DESCRIPTION - PAIN TYPE
TYPE: ACUTE PAIN
TYPE: ACUTE PAIN

## 2023-05-30 NOTE — PROGRESS NOTES
Received bedside shift report from Rosa JENNINGS RN regarding patient and assumed care. Patient awake, calm and stable, currently positioned in bed for comfort and safety; call light within reach. Denies pain or discomfort at this time. Will continue to monitor.

## 2023-05-30 NOTE — THERAPY
Physical Therapy   Daily Treatment     Patient Name: Bharath Diaz  Age:  42 y.o., Sex:  male  Medical Record #: [id number]  Today's Date: 5/30/2023     Precautions  Precautions: Fall Risk, TLSO (Thoracolumbosacral orthosis), Spinal / Back Precautions   Comments: possible R partial infraspinatus tear; R rib 1,8,9 fxs; T11-L1 posterior fusion; L3 AIS B    Subjective    Pt happy with progress with standing balance, transfers, and gait training today     Objective       05/30/23 0930   PT Charge Group   PT Therapeutic Activities (Units) 2   PT Total Time Spent   PT Individual Total Time Spent (Mins) 30   Transfer Functional Level of Assist   Bed, Chair, Wheelchair Transfer Total Assist X 2  (CGAx2 stand step FWW)   Bed Chair Wheelchair Transfer Description Increased time;Initial preparation for task;Set-up of equipment;Supervision for safety;Verbal cueing   Bed Mobility    Sit to Stand Minimal Assist   Neuro-Muscular Treatments   Comments sit to stands FWW x10 reps, static standing balance with emphasis on active quad and glute contraction and preventing locking out into extension, stand step transfer sequencing bariatric FWW x4 reps bed<>WC   Interdisciplinary Plan of Care Collaboration   IDT Collaboration with  Occupational Therapist;Therapy Tech   Patient Position at End of Therapy Seated;Call Light within Reach;Tray Table within Reach   Collaboration Comments assist with care; progress with therapy     AM session- trialed NMES to B anterior tib to pt's tolerance with no visible muscle contraction nor ankle movement even when cued with attempt at active contraction       05/30/23 1430   PT Charge Group   PT Gait Training (Units) 2   PT Neuromuscular Re-Education / Balance (Units) 1   PT Therapeutic Activities (Units) 1   PT Total Time Spent   PT Individual Total Time Spent (Mins) 60   Gait Functional Level of Assist    Gait Level Of Assist Total Assist X 2  (Vector 20lb unloading and WC follow)   Assistive Device  Front Wheel Walker   Distance (Feet) 40   # of Times Distance was Traveled 2  (as well as 20ft // bars fwd/bkwd)   Deviation Ataxic;Decreased Base Of Support  (B DF assist ace wrap)   Wheelchair Functional Level of Assist   Wheelchair Assist Minimal Assist  (SPV level ground, Ambar outdoors on slight incline)   Distance Wheelchair (Feet or Distance) 200x2   Wheelchair Description Extra time;Limited by fatigue;Supervision for safety;Verbal cueing   Transfer Functional Level of Assist   Bed, Chair, Wheelchair Transfer Contact Guard Assist   Bed Chair Wheelchair Transfer Description Squat pivot transfer to wheelchair;Supervision for safety;Set-up of equipment;Initial preparation for task;Verbal cueing  (squat pivot WC>bed to R with therapist stabilizing WC)   Bed Mobility    Sit to Supine Minimal Assist   Sit to Stand Minimal Assist   Neuro-Muscular Treatments   Neuro-Muscular Treatments Co-Contraction;Postural Facilitation;Tactile Cuing;Verbal Cuing   Comments static standing balance alternating single UE support on // bar 2 bouts x15 reps with B knee block though no buckling   Interdisciplinary Plan of Care Collaboration   IDT Collaboration with  Physical Therapist   Patient Position at End of Therapy In Bed;Bed Alarm On;Call Light within Reach;Tray Table within Reach   Collaboration Comments assist with care     Session focused on gait training with Vector harness and 20lb body weight support, static standing balance/postural re-ed, and WC mobility short distances outdoors    Assessment    Pt demonstrated improved dynamic standing balance today and sequencing with stand step transfers with FWW. Pt also progressed to ambulation with FWW up to 40ft with Vector bodyweight support system and B DF assist ace wrap. With occasional reminders, pt was better able to self-pace and slow down with functional mobility training to improve safety.  Strengths: Able to follow instructions, Good carryover of learning, Good insight  into deficits/needs, Independent prior level of function, Making steady progress towards goals, Motivated for self care and independence, Pleasant and cooperative, Supportive family, Willingly participates in therapeutic activities  Barriers: Bladder retention, Bowel incontinence, Impaired activity tolerance, Impaired balance, Pain, Limited mobility (paraplegia)    Plan    WC eval  AFO consult  Bed mobility- sit>supine  Stand step transfers FWW with second person assist, squat pivot if 1 person  Gait FWW with vector vs overground without body weight support  WC mobility indoors and outdoors    Passport items to be completed:  Get in/out of bed safely, in/out of a vehicle, safely use mobility device, walk or wheel around home/community, navigate up and down stairs, show how to get up/down from the ground, ensure home is accessible, demonstrate HEP, complete caregiver training    Physical Therapy Problems (Active)       Problem: Balance       Dates: Start:  05/24/23         Goal: STG-Within one week, patient will maintain static standing balance with B UE support and Ambar for 5 minutes to improve endurance and standing tolerance       Dates: Start:  05/24/23    Expected End:  06/08/23               Problem: Mobility       Dates: Start:  05/24/23         Goal: STG-Within one week, patient will propel wheelchair 50ft with SPV       Dates: Start:  05/24/23    Expected End:  06/08/23            Goal: STG-Within one week, patient will ambulate 10ft in parallel bars with maxA and WC follow       Dates: Start:  05/24/23    Expected End:  06/08/23               Problem: Mobility Transfers       Dates: Start:  05/24/23         Goal: STG-Within one week, patient will perform bed mobility with Ambar       Dates: Start:  05/24/23    Expected End:  06/08/23            Goal: STG-Within one week, patient will sit to stand with CGA-Ambar in parallel bars       Dates: Start:  05/24/23    Expected End:  06/08/23            Goal:  STG-Within one week, patient will transfer bed to chair with maxA x1 person       Dates: Start:  05/24/23    Expected End:  06/08/23               Problem: PT-Long Term Goals       Dates: Start:  05/24/23         Goal: LTG-By discharge, patient will tolerate standing for 10 min and intermittent UE support with SBA in order to facilitate performance of ADLs       Dates: Start:  05/24/23    Expected End:  06/08/23            Goal: LTG-By discharge, patient will propel wheelchair 150ft Bossman       Dates: Start:  05/24/23    Expected End:  06/08/23            Goal: LTG-By discharge, patient will ambulate 50ft Ambar using LRAD       Dates: Start:  05/24/23    Expected End:  06/08/23            Goal: LTG-By discharge, patient will transfer one surface to another with CGA       Dates: Start:  05/24/23    Expected End:  06/08/23            Goal: LTG-By discharge, patient will perform home exercise program independently       Dates: Start:  05/24/23    Expected End:  06/08/23            Goal: LTG-By discharge, patient will transfer in/out of a car with modA       Dates: Start:  05/24/23    Expected End:  06/08/23            Goal: LTG-By discharge, patient will perform bed mobility independently       Dates: Start:  05/24/23    Expected End:  06/08/23

## 2023-05-30 NOTE — THERAPY
"Occupational Therapy  Daily Treatment     Patient Name: Bharath Diaz  Age:  42 y.o., Sex:  male  Medical Record #: [id number]  Today's Date: 5/30/2023     Precautions  Precautions: Fall Risk, TLSO (Thoracolumbosacral orthosis), Spinal / Back Precautions   Comments: possible R partial infraspinatus tear; R rib 1,8,9 fxs; T11-L1 posterior fusion; L3 AIS B         Subjective    Pt seen 3x this tx day. Initial session at 1015 was short by 15 min due to therapist late arrival, but was able to make up at 1115. During PM session, pt with bowel incontinence.     Objective       05/30/23 1231   OT Charge Group   OT Self Care / ADL (Units) 1   OT Therapy Activity (Units) 1   OT Total Time Spent   OT Individual Total Time Spent (Mins) 30   Pain   Intervention Rest   Pain 0 - 10 Group   Location Shoulder   Location Orientation Right   Pain Rating Scale (NPRS)   (\"Sore\")   Description Aching;Sore   Comfort Goal Perform Activity;Comfort with Movement   Therapist Pain Assessment During Activity   Cognition    Level of Consciousness Alert   Functional Level of Assist   Grooming Modified Independent;Seated   Grooming Description Seated in wheelchair at sink  (For hand washing)   Upper Body Dressing Description Application of orthotic or brace  (setup for TLSO seated EOB)   Lower Body Dressing Description Set-up of equipment;Supervision for safety;Verbal cueing;Increased time;Cues for spinal precautions;Shoe horn  (increased time for placing foot in shoe without tying, LHSH)   Toileting Total Assist x 2  (Mod A x1 for standing balance, TA x1 for clothing management)   Toileting Description Assist for hygiene;Assist to pull pants up;Assist to pull pants down;Assist for standing balance;Grab bar;Increased time;Initial preparation for task;Set-up of equipment;Supervision for safety;Verbal cueing  (GB for stabilization, very small bowel incontinence.)   Bed, Chair, Wheelchair Transfer Moderate Assist  (Mod A x1 and another to assist " with w/c stabilization due to faulty breaks)   Bed Chair Wheelchair Transfer Description Squat pivot transfer to wheelchair;Supervision for safety;Verbal cueing;Set-up of equipment   Toilet Transfers Moderate Assist  (Mod A x1 and another to assist with w/c stabilization due to faulty breaks)   Toilet Transfer Description Adaptive equipment;Grab bar;Increased time;Initial preparation for task;Set-up of equipment;Supervision for safety;Verbal cueing;Verbal cues for spinal precautions  (Mod A Stand pivot with offset foot placement (advanced to sitting surface with lead foot). GB, L-knee blocking due to mild buckle, and gait belt. Using standard toilet for transfer.)   Interdisciplinary Plan of Care Collaboration   IDT Collaboration with  Certified Nursing Assistant;Physical Therapist;Nursing   Patient Position at End of Therapy Seated  (Pass off to nursing at end of session.)   Collaboration Comments re: bowel incontinence. Tech assisted with all functional transfers to stabilize w/c.       Pt presented pictures of home setup, as presented below and expressed that his sisters home will be the most accessible for bathing, while his mothers will be the most for toileting, although, there are still no measurements provided for appropriate recommendations. Reviewed handouts for vendor DME of a Juvo, and portable bidet. Introduced AE for suppository and digital stimulation via pictures from [employer]. Pt agreeable to purchase for trial.                                During PM session - Pt completed Min A SQPT w/c<>EOM and participated in bilateral scooting with significant time spent educating pt on spinal precautions and head to hip ratio, using powder board under rear, and push up blocks. This then triggered some bowel incontinence and a return to the BR.    Assessment    During review of pt's family home photo review, explained to pt that if he were to continue with lateral scoot transfers, the commodes that his mother  already has will not be easy to accomplish, as the do not have a drop arm. Pt tolerated sessions well with significant improvements made for SQPT, as limited by faulty w/c breaks.   Tub transfer trials went well, with pt able to complete with Min A and cues for forward leaning.  Strengths: Able to follow instructions, Alert and oriented, Effective communication skills, Good carryover of learning, Good insight into deficits/needs, Independent prior level of function, Making steady progress towards goals, Manages pain appropriately, Motivated for self care and independence, Pleasant and cooperative, Supportive family, Willingly participates in therapeutic activities  Barriers: Bladder incontinence, Bladder retention, Decreased endurance, Fatigue, Generalized weakness, Impaired activity tolerance, Impaired balance, Limited mobility, Pain    Plan    AE training for LB, sitting balance/functional transfers, BLE NMRE for sensation/strengthening, BUE strengthening without RUE ext rot, begin acquiring pictures and measurements of home setup for DME recommendations.     Passport items to be completed:  Perform bathroom transfers, complete dressing, complete feeding, get ready for the day, prepare a simple meal, participate in household tasks, adapt home for safety needs, demonstrate home exercise program, complete caregiver training     Occupational Therapy Goals (Active)       Problem: Bathing       Dates: Start:  05/24/23         Goal: STG-Within one week, patient will bathe at Min A level using DME/AE PRN.       Dates: Start:  05/24/23    Expected End:  06/08/23               Problem: Dressing       Dates: Start:  05/24/23         Goal: STG-Within one week, patient will dress UB at CGA level including don/doff of TLSO.       Dates: Start:  05/24/23    Expected End:  06/08/23            Goal: STG-Within one week, patient will dress LB at Mod A using AE/compensatory strategies PRN.       Dates: Start:  05/24/23    Expected  End:  06/08/23               Problem: Functional Transfers       Dates: Start:  05/24/23         Goal: STG-Within one week, patient will transfer to toilet at Max A x1 using DME/AE PRN.       Dates: Start:  05/24/23    Expected End:  06/08/23               Problem: OT Long Term Goals       Dates: Start:  05/24/23         Goal: LTG-By discharge, patient will complete basic self care tasks at Min A x1-Mod I level using DME/AE PRN.       Dates: Start:  05/24/23    Expected End:  06/08/23            Goal: LTG-By discharge, patient will perform bathroom transfers at Min A x1-Mod I level using DME/AE PRN.       Dates: Start:  05/24/23    Expected End:  06/08/23            Goal: LTG-By discharge, patient will complete basic home management at Min A x1-Mod I level using DME/AE PRN.       Dates: Start:  05/24/23    Expected End:  06/08/23               Problem: Toileting       Dates: Start:  05/24/23         Goal: STG-Within one week, patient will complete toileting tasks at Max A x1 using DME/AE PRN.       Dates: Start:  05/24/23    Expected End:  06/08/23
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
## 2023-05-30 NOTE — PROGRESS NOTES
Transfer Level & Assistive Devices Used: Standing Lift    Patient Position: toilet    Adaptive Equipment Used?  N/A     Patient Sensation and Bowel Urgency Present? yes     Incontinence? yes     BM Results: Medium. soft    1. Caregiver Present and actively participating in training? no    2. Patient Demonstrated Understanding with Bowel Medications and Purpose: yes     3. Patient Participation with Suppository Placement: no     4. Patient Participation with Digital Stimulation: no     5. Patient Participation with Clothing Management: no     6. Patient Participation with Hygiene: no

## 2023-05-30 NOTE — DOCUMENTATION QUERY
Transfer Level & Assistive Devices Used: CGA/Mod with Stand Machine    Patient Position: Regular Toilet (E.g., bed, bed side commode, mobile shower commode, commode over toilet, regular toilet)     Adaptive Equipment Used? No (E.g., digital stimulator, toileting aid for hygiene, suppository )      Patient Sensation and Bowel Urgency Present? Yes     Incontinence? No     BM Results: Yes, medium soft.    1. Caregiver Present and actively participating in training? No    2. Patient Demonstrated Understanding with Bowel Medications and Purpose: Yes     3. Patient Participation with Suppository Placement: Unable to insert at this time.     4. Patient Participation with Digital Stimulation: No     5. Patient Participation with Clothing Management: Yes     6. Patient Participation with Hygiene: No        (If patient meets all 6 of the criteria numbered above, consider rescheduling bowel program to evening 1700 or 0500.)      Other:  Pt requested to have bowel program done early this morning.  Unable to insert suppository this morning, assisted pt.  Unable to assist with digital stimulations.  Able to do two rounds with patient, and then he was ready to go back to bed.  Had medium, soft bm.

## 2023-05-31 ENCOUNTER — APPOINTMENT (OUTPATIENT)
Dept: PHYSICAL THERAPY | Facility: REHABILITATION | Age: 43
End: 2023-05-31
Attending: PHYSICAL MEDICINE & REHABILITATION
Payer: COMMERCIAL

## 2023-05-31 ENCOUNTER — APPOINTMENT (OUTPATIENT)
Dept: OCCUPATIONAL THERAPY | Facility: REHABILITATION | Age: 43
End: 2023-05-31
Attending: PHYSICAL MEDICINE & REHABILITATION
Payer: COMMERCIAL

## 2023-05-31 PROCEDURE — 97535 SELF CARE MNGMENT TRAINING: CPT

## 2023-05-31 PROCEDURE — 97530 THERAPEUTIC ACTIVITIES: CPT

## 2023-05-31 PROCEDURE — 700102 HCHG RX REV CODE 250 W/ 637 OVERRIDE(OP): Performed by: PHYSICAL MEDICINE & REHABILITATION

## 2023-05-31 PROCEDURE — 97116 GAIT TRAINING THERAPY: CPT

## 2023-05-31 PROCEDURE — 99232 SBSQ HOSP IP/OBS MODERATE 35: CPT | Performed by: PHYSICAL MEDICINE & REHABILITATION

## 2023-05-31 PROCEDURE — 700101 HCHG RX REV CODE 250: Performed by: PHYSICAL MEDICINE & REHABILITATION

## 2023-05-31 PROCEDURE — 770010 HCHG ROOM/CARE - REHAB SEMI PRIVAT*

## 2023-05-31 PROCEDURE — A9270 NON-COVERED ITEM OR SERVICE: HCPCS | Performed by: PHYSICAL MEDICINE & REHABILITATION

## 2023-05-31 PROCEDURE — 97112 NEUROMUSCULAR REEDUCATION: CPT

## 2023-05-31 PROCEDURE — 700111 HCHG RX REV CODE 636 W/ 250 OVERRIDE (IP): Performed by: PHYSICAL MEDICINE & REHABILITATION

## 2023-05-31 RX ORDER — POLYETHYLENE GLYCOL 3350 17 G/17G
1 POWDER, FOR SOLUTION ORAL
Status: COMPLETED | OUTPATIENT
Start: 2023-05-31 | End: 2023-05-31

## 2023-05-31 RX ORDER — ENEMA 19; 7 G/133ML; G/133ML
1 ENEMA RECTAL ONCE
Status: COMPLETED | OUTPATIENT
Start: 2023-05-31 | End: 2023-05-31

## 2023-05-31 RX ADMIN — CYCLOBENZAPRINE 5 MG: 10 TABLET, FILM COATED ORAL at 12:06

## 2023-05-31 RX ADMIN — POLYETHYLENE GLYCOL 3350 1 PACKET: 17 POWDER, FOR SOLUTION ORAL at 15:34

## 2023-05-31 RX ADMIN — LIDOCAINE HYDROCHLORIDE 1 APPLICATION: 20 JELLY TOPICAL at 06:33

## 2023-05-31 RX ADMIN — POLYETHYLENE GLYCOL 3350 1 PACKET: 17 POWDER, FOR SOLUTION ORAL at 13:59

## 2023-05-31 RX ADMIN — POLYETHYLENE GLYCOL 3350 1 PACKET: 17 POWDER, FOR SOLUTION ORAL at 16:29

## 2023-05-31 RX ADMIN — Medication 2000 UNITS: at 08:24

## 2023-05-31 RX ADMIN — DULOXETINE HYDROCHLORIDE 60 MG: 30 CAPSULE, DELAYED RELEASE ORAL at 08:25

## 2023-05-31 RX ADMIN — TIZANIDINE 4 MG: 4 TABLET ORAL at 14:00

## 2023-05-31 RX ADMIN — SODIUM PHOSPHATE 133 ML: 7; 19 ENEMA RECTAL at 21:01

## 2023-05-31 RX ADMIN — MIDODRINE HYDROCHLORIDE 5 MG: 2.5 TABLET ORAL at 11:57

## 2023-05-31 RX ADMIN — GABAPENTIN 900 MG: 300 CAPSULE ORAL at 14:00

## 2023-05-31 RX ADMIN — CYCLOBENZAPRINE 5 MG: 10 TABLET, FILM COATED ORAL at 05:17

## 2023-05-31 RX ADMIN — ENOXAPARIN SODIUM 40 MG: 100 INJECTION SUBCUTANEOUS at 21:00

## 2023-05-31 RX ADMIN — TAMSULOSIN HYDROCHLORIDE 0.8 MG: 0.4 CAPSULE ORAL at 17:27

## 2023-05-31 RX ADMIN — TESTOSTERONE GEL, 1% 100 MG: 10 GEL TRANSDERMAL at 10:21

## 2023-05-31 RX ADMIN — MAGNESIUM HYDROXIDE 30 ML: 1200 LIQUID ORAL at 06:35

## 2023-05-31 RX ADMIN — OXYCODONE HYDROCHLORIDE 10 MG: 10 TABLET ORAL at 08:26

## 2023-05-31 RX ADMIN — TIZANIDINE 4 MG: 4 TABLET ORAL at 08:25

## 2023-05-31 RX ADMIN — MULTIPLE VITAMINS W/ MINERALS TAB 1 TABLET: TAB at 11:57

## 2023-05-31 RX ADMIN — POLYETHYLENE GLYCOL 3350 1 PACKET: 17 POWDER, FOR SOLUTION ORAL at 17:27

## 2023-05-31 RX ADMIN — MIDODRINE HYDROCHLORIDE 5 MG: 2.5 TABLET ORAL at 08:25

## 2023-05-31 RX ADMIN — OXYCODONE HYDROCHLORIDE 10 MG: 10 TABLET ORAL at 15:45

## 2023-05-31 RX ADMIN — OMEPRAZOLE 20 MG: 20 CAPSULE, DELAYED RELEASE ORAL at 08:25

## 2023-05-31 RX ADMIN — GABAPENTIN 900 MG: 300 CAPSULE ORAL at 21:00

## 2023-05-31 RX ADMIN — ATORVASTATIN CALCIUM 20 MG: 10 TABLET, FILM COATED ORAL at 21:01

## 2023-05-31 RX ADMIN — OXYCODONE HYDROCHLORIDE 10 MG: 10 TABLET ORAL at 05:17

## 2023-05-31 RX ADMIN — OXYCODONE HYDROCHLORIDE 10 MG: 10 TABLET ORAL at 11:57

## 2023-05-31 RX ADMIN — GABAPENTIN 900 MG: 300 CAPSULE ORAL at 08:25

## 2023-05-31 RX ADMIN — MORPHINE SULFATE 30 MG: 30 TABLET, FILM COATED, EXTENDED RELEASE ORAL at 21:01

## 2023-05-31 RX ADMIN — TIZANIDINE 4 MG: 4 TABLET ORAL at 21:01

## 2023-05-31 RX ADMIN — LIDOCAINE 3 PATCH: 50 PATCH CUTANEOUS at 08:27

## 2023-05-31 RX ADMIN — DOCUSATE SODIUM 200 MG: 100 CAPSULE, LIQUID FILLED ORAL at 08:24

## 2023-05-31 RX ADMIN — MIDODRINE HYDROCHLORIDE 5 MG: 2.5 TABLET ORAL at 17:28

## 2023-05-31 RX ADMIN — SENNOSIDES 25.8 MG: 8.6 TABLET, FILM COATED ORAL at 21:00

## 2023-05-31 RX ADMIN — DOCUSATE SODIUM 200 MG: 100 CAPSULE, LIQUID FILLED ORAL at 21:01

## 2023-05-31 RX ADMIN — BISACODYL 10 MG: 10 SUPPOSITORY RECTAL at 05:16

## 2023-05-31 ASSESSMENT — PAIN SCALES - PAIN ASSESSMENT IN ADVANCED DEMENTIA (PAINAD)
BREATHING: NORMAL
FACIALEXPRESSION: SMILING OR INEXPRESSIVE
BODYLANGUAGE: RELAXED
CONSOLABILITY: NO NEED TO CONSOLE
FACIALEXPRESSION: SMILING OR INEXPRESSIVE
CONSOLABILITY: NO NEED TO CONSOLE
BREATHING: NORMAL
TOTALSCORE: 0
TOTALSCORE: 0
BODYLANGUAGE: RELAXED

## 2023-05-31 ASSESSMENT — PAIN SCALES - WONG BAKER
WONGBAKER_NUMERICALRESPONSE: HURTS JUST A LITTLE BIT
WONGBAKER_NUMERICALRESPONSE: HURTS JUST A LITTLE BIT

## 2023-05-31 ASSESSMENT — GAIT ASSESSMENTS
DEVIATION: ATAXIC;DECREASED BASE OF SUPPORT;BRADYKINETIC
GAIT LEVEL OF ASSIST: TOTAL ASSIST X 2
DISTANCE (FEET): 10
ASSISTIVE DEVICE: FRONT WHEEL WALKER

## 2023-05-31 ASSESSMENT — ACTIVITIES OF DAILY LIVING (ADL)
TOILETING_LEVEL_OF_ASSIST_DESCRIPTION: ASSIST FOR HYGIENE;ASSIST TO PULL PANTS UP;ASSIST TO PULL PANTS DOWN;ASSIST FOR STANDING BALANCE;GRAB BAR;INCREASED TIME;INITIAL PREPARATION FOR TASK;SET-UP OF EQUIPMENT;SUPERVISION FOR SAFETY;VERBAL CUEING
TOILET_TRANSFER_DESCRIPTION: GRAB BAR;INCREASED TIME;SET-UP OF EQUIPMENT;SUPERVISION FOR SAFETY;VERBAL CUEING

## 2023-05-31 ASSESSMENT — PAIN DESCRIPTION - PAIN TYPE
TYPE: ACUTE PAIN
TYPE: ACUTE PAIN
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
## 2023-05-31 NOTE — PROGRESS NOTES
Received bedside shift report from Karen ZARAGOZA RN regarding patient and assumed care. Patient awake, calm and stable, currently positioned in bed for comfort and safety; call light within reach. Denies pain or discomfort at this time. Will continue to monitor.

## 2023-05-31 NOTE — PROGRESS NOTES
Chief Complaint   Patient presents with   • Office Visit     Bilateral knee pain       HISTORY    The patient is a 63 year old female who presents  for evaluation of pain and swelling in the right knee.  She said she would gotten up to go to the bathroom about 4 nights ago and the right knee gave out she landed on both knees and gotten abrasion on the left knee but that 1 seems to be doing better the right knee is swollen painful.  No history of the knee giving out previously no locking up with the right knee.    Past Medical History:   Diagnosis Date   • Adjustment disorder with mixed anxiety and depressed mood    • Anxiety    • Cerebral infarction (CMS/HCC)    • Chronic pain disorder 2016   • Colitis    • CVA (cerebral vascular accident) (CMS/HCC)     right side, age 35   • Degeneration, intervertebral disc, lumbar    • Depression    • Essential (primary) hypertension    • Gastroesophageal reflux disease     colitis   • High cholesterol    • Hypercholesterolemia    • Malignant neoplasm (CMS/HCC)    • Nausea    • Opioid type dependence, continuous (CMS/HCC)    • Ovarian cyst    • Ulcer        Past Surgical History:   Procedure Laterality Date   • Anes oophorectomy part/tot unilat/bilat     • Appendectomy     • Back surgery      lumbar   • Bilateral oophorectomy     • Breast biopsy Left 2019   • Carotid angioplasty Right     Age 35   • Colonoscopy  2021    repeat colon in 5 yrs adenoma polyps   • Egd  2021   • Hysterectomy     • Tubal ligation         Social History     Socioeconomic History   • Marital status: /Civil Union     Spouse name: Not on file   • Number of children: Not on file   • Years of education: Not on file   • Highest education level: Not on file   Occupational History   • Not on file   Tobacco Use   • Smoking status: Heavy Smoker     Packs/day: 0.50     Years: 40.00     Pack years: 20.00     Types: Cigarettes     Last attempt to quit: 3/4/2020     Years since quittin.8  Transfer Level & Assistive Devices Used: CGA/Standing Machine    Patient Position: Regular Diet (E.g., bed, bed side commode, mobile shower commode, commode over toilet, regular toilet)     Adaptive Equipment Used? N/A (E.g., digital stimulator, toileting aid for hygiene, suppository )      Patient Sensation and Bowel Urgency Present? Yes     Incontinence? No     BM Results: Yes    1. Caregiver Present and actively participating in training? No    2. Patient Demonstrated Understanding with Bowel Medications and Purpose: Yes     3. Patient Participation with Suppository Placement: No     4. Patient Participation with Digital Stimulation: No     5. Patient Participation with Clothing Management: Yes     6. Patient Participation with Hygiene: Yes        (If patient meets all 6 of the criteria numbered above, consider rescheduling bowel program to evening 1700 or 0500.)      Other:  Bowel program done this morning.  I assisted with dulcolax suppository insertion.  Unable to do digital stimulations with brace in place.  Able to do three series of digital stimulation.  Had a small soft bowel movement with bowel program this morning.     • Smokeless tobacco: Never   Vaping Use   • Vaping Use: never used   Substance and Sexual Activity   • Alcohol use: No   • Drug use: No   • Sexual activity: Not Currently   Other Topics Concern   • Not on file   Social History Narrative   • Not on file     Social Determinants of Health     Financial Resource Strain: Not on file   Food Insecurity: Not on file   Transportation Needs: Not on file   Physical Activity: Not on file   Stress: Not on file   Social Connections: Not on file   Intimate Partner Violence: Not At Risk   • Social Determinants: Intimate Partner Violence Past Fear: No   • Social Determinants: Intimate Partner Violence Current Fear: No       Family History   Problem Relation Age of Onset   • COPD Mother    • Cancer Mother         breast   • Cancer Father         colon   • Patient is unaware of any medical problems Sister    • Patient is unaware of any medical problems Sister    • Patient is unaware of any medical problems Sister    • Patient is unaware of any medical problems Sister    • Patient is unaware of any medical problems Brother    • Patient is unaware of any medical problems Brother    • Patient is unaware of any medical problems Brother    • Patient is unaware of any medical problems Brother    • Other Son         Familial colon polyposis.       Patient Active Problem List   Diagnosis   • Opioid type dependence in remission (CMS/HCC)   • CVA (cerebral vascular accident) (CMS/HCC)   • Chronic pain disorder   • Macrocytosis without anemia   • Panic disorder   • Dyslipidemia   • Benign essential hypertension   • Primary osteoarthritis of right hip   • Trochanteric bursitis of right hip   • Sciatica of right side   • Screen for colon cancer   • Gastroesophageal reflux disease without esophagitis   • Chronic anxiety   • Restless legs syndrome   • COVID-19 virus detected   • Grief       Current Outpatient Medications   Medication Sig   • clonazePAM (KlonoPIN) 1 MG tablet Take 1 tablet by mouth  2 times daily as needed for Anxiety.   • carbidopa-levodopa (SINEMET)  MG per tablet Take 1 tablet by mouth with supper and 1 tablet at bedtime   • gabapentin (NEURONTIN) 300 MG capsule Take 3 capsules by mouth 3 times daily.   • ondansetron (ZOFRAN) 4 MG tablet Take 1 tablet by mouth daily.   • lisinopril (ZESTRIL) 20 MG tablet Take 1 tablet by mouth daily.   • Cholecalciferol (VITAMIN D-3 SUPER STRENGTH) 2000 units Tab Take 2,000 Units by mouth.   • Multiple Vitamins-Minerals (CENTRUM SILVER PO)      No current facility-administered medications for this visit.       Allergies as of 01/20/2023 - Reviewed 01/20/2023   Allergen Reaction Noted   • Iodine   (environmental or med) SWELLING 05/08/2019       ROS  No numbness or tingling in the lower extremities.      PHYSICAL EXAM  Vitals: Blood pressure (!) 162/80, pulse 91, temperature 97.6 °F (36.4 °C), temperature source Temporal, resp. rate 16, height 5' 1.5\" (1.562 m), weight 63.7 kg (140 lb 6.4 oz), SpO2 98 %.  Exam she is a pleasant mildly obese female no acute distress  Right knee exam reveals mild effusion present which limits flexion and is present on ballotabity of the patella.  Knee is stable to varus and valgus maneuvering.  She has pain with anterior drawer testing but the knee seems stable with this.  Arin's was deferred.    Four view x-rays obtained, including bilateral weight-bearing which showed:  No evidence for acute fracture.  She has good joint space throughout even on the sunrise view of the knee.      ASSESSMENT  1. Need for vaccination    2. Need for hepatitis C screening test    3. Injury of right knee, initial encounter        PLAN    1. Recommended icing and meloxicam for now  2. She was instructed not to use over-the-counter Aleve or ibuprofen while she is on the meloxicam   3. Return to clinic for follow-up in 2 weeks, she can see her PCP at that time for re-evaluation.  4. Discussed with her that she may need an MRI scan to look  at the internal structures of the knee depending on how she looks after the swelling and acute pain go down.    Trenton Chanel MD, FAAFP

## 2023-05-31 NOTE — THERAPY
Occupational Therapy  Daily Treatment     Patient Name: Bharath Diaz  Age:  42 y.o., Sex:  male  Medical Record #: [id number]  Today's Date: 5/31/2023     Precautions  Precautions: Fall Risk, TLSO (Thoracolumbosacral orthosis), Spinal / Back Precautions   Comments: possible R partial infraspinatus tear; R rib 1,8,9 fxs; T11-L1 posterior fusion; L3 AIS B         Subjective    Pt seen 2x this day, and agreeable to both sessions. During PM session, pt was with nursing having staples removed from back. PT to make up later in day.     Objective       05/31/23 0831 05/31/23 1315   Therapy Missed   Missed Therapy (Minutes)  --  15   Reason For Missed Therapy  --  Medical - Patient with Nursing;Medical - Patient  in Procedure  (Pt with nursing having back staples removed. PT to make up this PM.)   OT Charge Group   OT Self Care / ADL (Units) 2  --    OT Therapy Activity (Units) 2 1   OT Total Time Spent   OT Individual Total Time Spent (Mins) 60 15   Functional Level of Assist   Toileting Total Assist x 2  --    Toileting Description Assist for hygiene;Assist to pull pants up;Assist to pull pants down;Assist for standing balance;Grab bar;Increased time;Initial preparation for task;Set-up of equipment;Supervision for safety;Verbal cueing  (GB for stabilization, no bowel incontinence. Mod A x1 for stabilization with 1x knee buckle.)  --    Bed, Chair, Wheelchair Transfer Minimal Assist Minimal Assist   Bed Chair Wheelchair Transfer Description Supervision for safety;Set-up of equipment;Increased time;Initial preparation for task;Verbal cueing;Squat pivot transfer to wheelchair Set-up of equipment;Increased time;Initial preparation for task;Supervision for safety;Verbal cueing;Squat pivot transfer to wheelchair  (Min A SQPT with second person to brace faulty break w/c.)   Toilet Transfers Moderate Assist  (Mod A x1 and another to assist with w/c stabilization due to faulty breaks) Minimal Assist   Toilet Transfer  Description Adaptive equipment;Grab bar;Increased time;Initial preparation for task;Set-up of equipment;Supervision for safety;Verbal cueing;Verbal cues for spinal precautions  (Mod A Stand pivot with offset foot placement (advanced to sitting surface with lead foot). GB, L-knee blocking, to standard toilet.) Grab bar;Increased time;Set-up of equipment;Supervision for safety;Verbal cueing  (Min A SQPT with second person to brace faulty break w/c. Completed 3x with focus on offset foot placement.)   Sitting Upper Body Exercises   Comments isometric core strengthening for 1 set of 5 reps with 10 second holds.  --    Balance   Skilled Intervention Postural Facilitation;Tactile Cuing;Verbal Cuing;Sequencing  --    Comments Pt participated in 1 set of 5 reps and 1 set of 3 reps lift off from EOM while seated on powder board with step placed under feet and focus on sequencing forward leaning and foot placement in preparation for SQPT to toilet. Limited by flatus and bowel urgency.  --    Interdisciplinary Plan of Care Collaboration   IDT Collaboration with  Physical Therapist;Therapy Tech Therapy Tech;Physical Therapist   Patient Position at End of Therapy Seated;Call Light within Reach;Tray Table within Reach Seated;Call Light within Reach;Tray Table within Reach   Collaboration Comments POC, CLOF, tech assist with session tech assist with session. PT re: NELY VELEZ     Per pt report, he may be DC'ing to his sisters home, as it is more accessible at a w/c level. .  Pt also participated in outdoor w/c mobility with Min A during declines over bridge outside. He self-propelled ~400+ ft over inclines and declines, level/unlevel cement, wood, and cobble stones. No LOB and good ant/post weight shifting when appropriate. He did require 3x rest breaks due to fatigue and R-shoulder discomfort.     Assessment    Pt with good tolerance to OT sessions this day. He continues to make good progress with functional transfers, and now  with a more solid DC plan, simulations can begin next session. Pt produces max effort and does require cues to slow pace at times.  Strengths: Able to follow instructions, Alert and oriented, Effective communication skills, Good carryover of learning, Good insight into deficits/needs, Independent prior level of function, Making steady progress towards goals, Manages pain appropriately, Motivated for self care and independence, Pleasant and cooperative, Supportive family, Willingly participates in therapeutic activities  Barriers: Bladder incontinence, Bladder retention, Decreased endurance, Fatigue, Generalized weakness, Impaired activity tolerance, Impaired balance, Limited mobility, Pain    Plan    Shower tomorrow in AM prior to team meeting.    AE training for LB, sitting balance/functional transfers, BLE NMRE for sensation/strengthening, BUE strengthening without RUE ext rot, begin acquiring pictures and measurements of home setup for DME recommendations.     Passport items to be completed:  Perform bathroom transfers, complete dressing, complete feeding, get ready for the day, prepare a simple meal, participate in household tasks, adapt home for safety needs, demonstrate home exercise program, complete caregiver training    Occupational Therapy Goals (Active)       Problem: Bathing       Dates: Start:  05/24/23         Goal: STG-Within one week, patient will bathe at Min A level using DME/AE PRN.       Dates: Start:  05/24/23    Expected End:  06/08/23               Problem: Dressing       Dates: Start:  05/24/23         Goal: STG-Within one week, patient will dress UB at CGA level including don/doff of TLSO.       Dates: Start:  05/24/23    Expected End:  06/08/23            Goal: STG-Within one week, patient will dress LB at Mod A using AE/compensatory strategies PRN.       Dates: Start:  05/24/23    Expected End:  06/08/23               Problem: Functional Transfers       Dates: Start:  05/24/23          Goal: STG-Within one week, patient will transfer to toilet at Max A x1 using DME/AE PRN.       Dates: Start:  05/24/23    Expected End:  06/08/23               Problem: OT Long Term Goals       Dates: Start:  05/24/23         Goal: LTG-By discharge, patient will complete basic self care tasks at Min A x1-Mod I level using DME/AE PRN.       Dates: Start:  05/24/23    Expected End:  06/08/23            Goal: LTG-By discharge, patient will perform bathroom transfers at Min A x1-Mod I level using DME/AE PRN.       Dates: Start:  05/24/23    Expected End:  06/08/23            Goal: LTG-By discharge, patient will complete basic home management at Min A x1-Mod I level using DME/AE PRN.       Dates: Start:  05/24/23    Expected End:  06/08/23               Problem: Toileting       Dates: Start:  05/24/23         Goal: STG-Within one week, patient will complete toileting tasks at Max A x1 using DME/AE PRN.       Dates: Start:  05/24/23    Expected End:  06/08/23
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
## 2023-05-31 NOTE — THERAPY
"Physical Therapy   Daily Treatment     Patient Name: Bharath Diaz  Age:  42 y.o., Sex:  male  Medical Record #: [id number]  Today's Date: 5/31/2023     Precautions  Precautions: Fall Risk, TLSO (Thoracolumbosacral orthosis), Spinal / Back Precautions   Comments: possible R partial infraspinatus tear; R rib 1,8,9 fxs; T11-L1 posterior fusion; L3 AIS B    Subjective    \"I feel more stable and stronger every day\"     Objective       05/31/23 0930   PT Charge Group   PT Gait Training (Units) 1   PT Neuromuscular Re-Education / Balance (Units) 1   PT Total Time Spent   PT Individual Total Time Spent (Mins) 30   Gait Functional Level of Assist    Gait Level Of Assist Total Assist X 2  (Ambar with WC follow)   Assistive Device Front Wheel Walker   Distance (Feet) 10   # of Times Distance was Traveled 1  (as well as 5ft x1)   Deviation Ataxic;Decreased Base Of Support;Bradykinetic  (heavy use of UEs, decr coordination in swing R LE)   Transfer Functional Level of Assist   Bed, Chair, Wheelchair Transfer Minimal Assist   Bed Chair Wheelchair Transfer Description Set-up of equipment;Increased time;Initial preparation for task;Supervision for safety;Verbal cueing  (stand step FWW)   Sitting Lower Body Exercises   Sit to Stand 2 sets of 10  (from WC to FWW)   Bed Mobility    Sit to Stand Contact Guard Assist   Neuro-Muscular Treatments   Neuro-Muscular Treatments Electrical Stimulation;Postural Facilitation;Verbal Cuing   Comments sit to stands x10 in [place] with NMES to glutes   Interdisciplinary Plan of Care Collaboration   IDT Collaboration with  Occupational Therapist;Physician   Patient Position at End of Therapy Seated;Call Light within Reach;Tray Table within Reach;Phone within Reach   Collaboration Comments progress with therapy         Assessment    Pt was able able to progress to Ambar for stand step transfer with FWW with B DF assist ace wraps. He was also able to ambulate with FWW for 10ft with bodyweight " support harness. Pt does heavily overuse UEs for stability and is limited by R>L LE ataxia/incoordination due to weakness and impaired sensation. He was not able to elicit significant glute contraction with NMES in standing.  Strengths: Able to follow instructions, Good carryover of learning, Good insight into deficits/needs, Independent prior level of function, Making steady progress towards goals, Motivated for self care and independence, Pleasant and cooperative, Supportive family, Willingly participates in therapeutic activities  Barriers: Bladder retention, Bowel incontinence, Impaired activity tolerance, Impaired balance, Pain, Limited mobility (paraplegia)    Plan    AFO consult  Bed mobility- sit>supine with logroll  Stand step transfers FWW with second person assist, squat pivot if 1 person  Gait FWW with vector vs overground without body weight support and WC follow  WC mobility indoors and outdoors     Passport items to be completed:  Get in/out of bed safely, in/out of a vehicle, safely use mobility device, walk or wheel around home/community, navigate up and down stairs, show how to get up/down from the ground, ensure home is accessible, demonstrate HEP, complete caregiver training        Physical Therapy Problems (Active)       Problem: Balance       Dates: Start:  05/24/23         Goal: STG-Within one week, patient will maintain static standing balance with B UE support and Ambar for 5 minutes to improve endurance and standing tolerance       Dates: Start:  05/24/23    Expected End:  06/08/23               Problem: Mobility       Dates: Start:  05/24/23         Goal: STG-Within one week, patient will propel wheelchair 50ft with SPV       Dates: Start:  05/24/23    Expected End:  06/08/23            Goal: STG-Within one week, patient will ambulate 10ft in parallel bars with maxA and WC follow       Dates: Start:  05/24/23    Expected End:  06/08/23               Problem: Mobility Transfers       Dates:  Start:  05/24/23         Goal: STG-Within one week, patient will perform bed mobility with Ambar       Dates: Start:  05/24/23    Expected End:  06/08/23            Goal: STG-Within one week, patient will sit to stand with CGA-Ambar in parallel bars       Dates: Start:  05/24/23    Expected End:  06/08/23            Goal: STG-Within one week, patient will transfer bed to chair with maxA x1 person       Dates: Start:  05/24/23    Expected End:  06/08/23               Problem: PT-Long Term Goals       Dates: Start:  05/24/23         Goal: LTG-By discharge, patient will tolerate standing for 10 min and intermittent UE support with SBA in order to facilitate performance of ADLs       Dates: Start:  05/24/23    Expected End:  06/08/23            Goal: LTG-By discharge, patient will propel wheelchair 150ft Bossman       Dates: Start:  05/24/23    Expected End:  06/08/23            Goal: LTG-By discharge, patient will ambulate 50ft Ambar using LRAD       Dates: Start:  05/24/23    Expected End:  06/08/23            Goal: LTG-By discharge, patient will transfer one surface to another with CGA       Dates: Start:  05/24/23    Expected End:  06/08/23            Goal: LTG-By discharge, patient will perform home exercise program independently       Dates: Start:  05/24/23    Expected End:  06/08/23            Goal: LTG-By discharge, patient will transfer in/out of a car with modA       Dates: Start:  05/24/23    Expected End:  06/08/23            Goal: LTG-By discharge, patient will perform bed mobility independently       Dates: Start:  05/24/23    Expected End:  06/08/23

## 2023-05-31 NOTE — DISCHARGE PLANNING
Spoke with patient in room. Discussed FMLA paperwork and setting up new pcp. Patient requested I speak with sister to get suggestions for PCP. Patient will be contacting his HR to get the process started.     Spoke with sister. She suggested Dr. Walker, Dr. Haro or anyone in their offices.

## 2023-05-31 NOTE — DISCHARGE PLANNING
Received new patient paperwork via fax for patient to fill out. Patient has appointment on 06/22/2023 with [facility] with new pcp.     CM to  paperwork and fax back as soon as possible.

## 2023-06-01 ENCOUNTER — APPOINTMENT (OUTPATIENT)
Dept: PHYSICAL THERAPY | Facility: REHABILITATION | Age: 43
DRG: 949 | End: 2023-06-01
Attending: PHYSICAL MEDICINE & REHABILITATION
Payer: COMMERCIAL

## 2023-06-01 ENCOUNTER — APPOINTMENT (OUTPATIENT)
Dept: RADIOLOGY | Facility: REHABILITATION | Age: 43
DRG: 949 | End: 2023-06-01
Attending: PHYSICAL MEDICINE & REHABILITATION
Payer: COMMERCIAL

## 2023-06-01 PROCEDURE — 97110 THERAPEUTIC EXERCISES: CPT

## 2023-06-01 PROCEDURE — 97116 GAIT TRAINING THERAPY: CPT

## 2023-06-01 PROCEDURE — 700111 HCHG RX REV CODE 636 W/ 250 OVERRIDE (IP): Performed by: PHYSICAL MEDICINE & REHABILITATION

## 2023-06-01 PROCEDURE — 74018 RADEX ABDOMEN 1 VIEW: CPT

## 2023-06-01 PROCEDURE — 97530 THERAPEUTIC ACTIVITIES: CPT

## 2023-06-01 PROCEDURE — 97535 SELF CARE MNGMENT TRAINING: CPT

## 2023-06-01 PROCEDURE — 700101 HCHG RX REV CODE 250: Performed by: PHYSICAL MEDICINE & REHABILITATION

## 2023-06-01 PROCEDURE — 770010 HCHG ROOM/CARE - REHAB SEMI PRIVAT*

## 2023-06-01 PROCEDURE — 99233 SBSQ HOSP IP/OBS HIGH 50: CPT | Performed by: PHYSICAL MEDICINE & REHABILITATION

## 2023-06-01 PROCEDURE — A9270 NON-COVERED ITEM OR SERVICE: HCPCS | Performed by: PHYSICAL MEDICINE & REHABILITATION

## 2023-06-01 PROCEDURE — 700102 HCHG RX REV CODE 250 W/ 637 OVERRIDE(OP): Performed by: PHYSICAL MEDICINE & REHABILITATION

## 2023-06-01 PROCEDURE — 97112 NEUROMUSCULAR REEDUCATION: CPT

## 2023-06-01 RX ORDER — TAMSULOSIN HYDROCHLORIDE 0.4 MG/1
0.4 CAPSULE ORAL
Status: DISCONTINUED | OUTPATIENT
Start: 2023-06-01 | End: 2023-06-05

## 2023-06-01 RX ADMIN — Medication 2000 UNITS: at 08:02

## 2023-06-01 RX ADMIN — GABAPENTIN 900 MG: 300 CAPSULE ORAL at 20:24

## 2023-06-01 RX ADMIN — ATORVASTATIN CALCIUM 20 MG: 10 TABLET, FILM COATED ORAL at 20:25

## 2023-06-01 RX ADMIN — GABAPENTIN 900 MG: 300 CAPSULE ORAL at 08:02

## 2023-06-01 RX ADMIN — DOCUSATE SODIUM 200 MG: 100 CAPSULE, LIQUID FILLED ORAL at 20:24

## 2023-06-01 RX ADMIN — LIDOCAINE HYDROCHLORIDE 2 %: 20 JELLY TOPICAL at 07:58

## 2023-06-01 RX ADMIN — LIDOCAINE 3 PATCH: 50 PATCH CUTANEOUS at 08:03

## 2023-06-01 RX ADMIN — DULOXETINE HYDROCHLORIDE 60 MG: 30 CAPSULE, DELAYED RELEASE ORAL at 08:03

## 2023-06-01 RX ADMIN — BISACODYL 10 MG: 10 SUPPOSITORY RECTAL at 05:10

## 2023-06-01 RX ADMIN — OMEPRAZOLE 20 MG: 20 CAPSULE, DELAYED RELEASE ORAL at 08:03

## 2023-06-01 RX ADMIN — TAMSULOSIN HYDROCHLORIDE 0.4 MG: 0.4 CAPSULE ORAL at 17:23

## 2023-06-01 RX ADMIN — SENNOSIDES 25.8 MG: 8.6 TABLET, FILM COATED ORAL at 20:24

## 2023-06-01 RX ADMIN — TIZANIDINE 4 MG: 4 TABLET ORAL at 08:05

## 2023-06-01 RX ADMIN — OXYCODONE HYDROCHLORIDE 10 MG: 10 TABLET ORAL at 11:51

## 2023-06-01 RX ADMIN — TESTOSTERONE GEL, 1% 100 MG: 10 GEL TRANSDERMAL at 08:06

## 2023-06-01 RX ADMIN — OXYCODONE HYDROCHLORIDE 10 MG: 10 TABLET ORAL at 05:14

## 2023-06-01 RX ADMIN — MIDODRINE HYDROCHLORIDE 5 MG: 2.5 TABLET ORAL at 11:50

## 2023-06-01 RX ADMIN — MIDODRINE HYDROCHLORIDE 5 MG: 2.5 TABLET ORAL at 08:03

## 2023-06-01 RX ADMIN — GABAPENTIN 900 MG: 300 CAPSULE ORAL at 16:04

## 2023-06-01 RX ADMIN — ENOXAPARIN SODIUM 40 MG: 100 INJECTION SUBCUTANEOUS at 20:25

## 2023-06-01 RX ADMIN — DOCUSATE SODIUM 200 MG: 100 CAPSULE, LIQUID FILLED ORAL at 08:03

## 2023-06-01 RX ADMIN — MIDODRINE HYDROCHLORIDE 5 MG: 2.5 TABLET ORAL at 17:23

## 2023-06-01 RX ADMIN — TIZANIDINE 4 MG: 4 TABLET ORAL at 20:25

## 2023-06-01 RX ADMIN — TIZANIDINE 4 MG: 4 TABLET ORAL at 16:04

## 2023-06-01 RX ADMIN — OXYCODONE HYDROCHLORIDE 10 MG: 10 TABLET ORAL at 08:02

## 2023-06-01 RX ADMIN — MULTIPLE VITAMINS W/ MINERALS TAB 1 TABLET: TAB at 11:50

## 2023-06-01 RX ADMIN — MORPHINE SULFATE 30 MG: 30 TABLET, FILM COATED, EXTENDED RELEASE ORAL at 20:24

## 2023-06-01 RX ADMIN — OXYCODONE HYDROCHLORIDE 10 MG: 10 TABLET ORAL at 17:32

## 2023-06-01 ASSESSMENT — GAIT ASSESSMENTS
GAIT LEVEL OF ASSIST: MINIMAL ASSIST
DISTANCE (FEET): 15
DEVIATION: ATAXIC;DECREASED BASE OF SUPPORT;BRADYKINETIC
DEVIATION: ATAXIC;DECREASED BASE OF SUPPORT;BRADYKINETIC
GAIT LEVEL OF ASSIST: TOTAL ASSIST X 2
ASSISTIVE DEVICE: FRONT WHEEL WALKER
ASSISTIVE DEVICE: PARALLEL BARS
DISTANCE (FEET): 20

## 2023-06-01 ASSESSMENT — ACTIVITIES OF DAILY LIVING (ADL)
BED_CHAIR_WHEELCHAIR_TRANSFER_DESCRIPTION: SET-UP OF EQUIPMENT;SUPERVISION FOR SAFETY;VERBAL CUEING
BED_CHAIR_WHEELCHAIR_TRANSFER_DESCRIPTION: INCREASED TIME;SUPERVISION FOR SAFETY;VERBAL CUEING;INITIAL PREPARATION FOR TASK
TOILET_TRANSFER_DESCRIPTION: ADAPTIVE EQUIPMENT;INCREASED TIME;SET-UP OF EQUIPMENT;SUPERVISION FOR SAFETY;VERBAL CUEING
TUB_SHOWER_TRANSFER_DESCRIPTION: SHOWER BENCH;INCREASED TIME;SET-UP OF EQUIPMENT;SUPERVISION FOR SAFETY
BED_CHAIR_WHEELCHAIR_TRANSFER_DESCRIPTION: SET-UP OF EQUIPMENT;SUPERVISION FOR SAFETY;VERBAL CUEING;SQUAT PIVOT TRANSFER TO WHEELCHAIR
BED_CHAIR_WHEELCHAIR_TRANSFER_DESCRIPTION: SET-UP OF EQUIPMENT;SUPERVISION FOR SAFETY;VERBAL CUEING
BED_CHAIR_WHEELCHAIR_TRANSFER_DESCRIPTION: INCREASED TIME;SET-UP OF EQUIPMENT;SUPERVISION FOR SAFETY;VERBAL CUEING

## 2023-06-01 ASSESSMENT — PAIN DESCRIPTION - PAIN TYPE: TYPE: ACUTE PAIN;SURGICAL PAIN
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
## 2023-06-01 NOTE — DISCHARGE PLANNING
Case Management / Initial authorization:  Auth # [id number]  Days authorized:5/22 to 6/5 - 15 days.   Corrected date of admission is 5/23 - 6/6     Clinical concurrent review due on 6/5  Name: Yg  Phone:   Fax:

## 2023-06-01 NOTE — THERAPY
Physical Therapy   Daily Treatment     Patient Name: Bharath Diaz  Age:  42 y.o., Sex:  male  Medical Record #: [id number]  Today's Date: 6/1/2023     Precautions  Precautions: Fall Risk, TLSO (Thoracolumbosacral orthosis), Spinal / Back Precautions   Comments: possible R partial infraspinatus tear; R rib 1,8,9 fxs; T11-L1 posterior fusion; L3 AIS B    Subjective    Pt expressing frustration with bowel program. Agreeable to tx and eager to participate     Objective       06/01/23 0901   PT Charge Group   PT Gait Training (Units) 1   PT Therapeutic Exercise (Units) 1   PT Neuromuscular Re-Education / Balance (Units) 1   PT Therapeutic Activities (Units) 1   PT Total Time Spent   PT Individual Total Time Spent (Mins) 60   Gait Functional Level of Assist    Gait Level Of Assist Total Assist X 2  (Ambar with WC follow)   Assistive Device Front Wheel Walker   Distance (Feet) 20   # of Times Distance was Traveled 1  (as well as 10ftx1 with LOB and quick descent to WC)   Deviation Ataxic;Decreased Base Of Support;Bradykinetic  (heavy use of UEs, R LE ER)   Wheelchair Functional Level of Assist   Wheelchair Assist Stand by Assist   Distance Wheelchair (Feet or Distance) 150   Wheelchair Description Extra time;Supervision for safety   Transfer Functional Level of Assist   Bed, Chair, Wheelchair Transfer Minimal Assist   Bed Chair Wheelchair Transfer Description Set-up of equipment;Supervision for safety;Verbal cueing  (stand step FWW, CGA squat pivot)   Supine Lower Body Exercise   Bridges Two Legged;1 set of 10   Hip Abduction Side Lying;3 sets of 10;Right ;Left  (sidelying clams)   Hip Adduction  3 sets of 10;Bilateral  (hooklying CHUCK ball squeeze x5sec hold)   Knee to Chest 2 sets of 10;Bilateral  (B LE on physioball hip flexion to 90 deg)   Other Exercises prone hip extension AAROM with NMES to B glute 1x15 ea   Sitting Lower Body Exercises   Sit to Stand 1 set of 10  (from WC to FWW)   Bed Mobility    Supine to Sit  Standby Assist   Sit to Supine Standby Assist   Sit to Stand Contact Guard Assist   Scooting Supervised   Rolling Supervised   Neuro-Muscular Treatments   Neuro-Muscular Treatments Electrical Stimulation   Comments prone hip ext AAROM, see above   Interdisciplinary Plan of Care Collaboration   IDT Collaboration with  Occupational Therapist   Patient Position at End of Therapy Seated;Call Light within Reach;Phone within Reach   Collaboration Comments handoff of care     Completed gait training with FWW and B DF assist ace wrap, stand step transfer training, and therex/NRE on firm mat table as described above  Discussed using UEs on FWW to stabilize when experiencing a LOB vs bailing to the WC follow     06/01/23 1530   PT Charge Group   PT Gait Training (Units) 1   PT Therapeutic Activities (Units) 1   PT Total Time Spent   PT Individual Total Time Spent (Mins) 30   Gait Functional Level of Assist    Gait Level Of Assist Minimal Assist   Assistive Device Parallel Bars   Distance (Feet) 15   # of Times Distance was Traveled 1  (fwd/bkwd with B DF assist AFO)   Deviation Ataxic;Decreased Base Of Support;Bradykinetic  (occasional overstep R LE with R hip ER)   Wheelchair Functional Level of Assist   Wheelchair Assist Supervised   Distance Wheelchair (Feet or Distance) 150   Wheelchair Description Extra time;Limited by fatigue   Transfer Functional Level of Assist   Bed, Chair, Wheelchair Transfer Contact Guard Assist   Bed Chair Wheelchair Transfer Description Increased time;Set-up of equipment;Supervision for safety;Verbal cueing  (stabilization)   Bed Mobility    Sit to Stand Standby Assist  (// bars)   Interdisciplinary Plan of Care Collaboration   IDT Collaboration with    (Orthotist/prosthetist)   Patient Position at End of Therapy Seated;Call Light within Reach;Tray Table within Reach;Phone within Reach   Collaboration Comments B AFO consult     Session focused on B custom AFO fitting with Lenny damon      Assessment    Pt was able to progress to ambulate 20ft with FWW and WC follow today, however on the second attempt pt had a LOB and quickly sat to WC. Attempted to elicit glute contraction with e-stim in prone with flexed knee hip extension however pt using compensatory strategies to achieve the movement.  Strengths: Able to follow instructions, Good carryover of learning, Good insight into deficits/needs, Independent prior level of function, Making steady progress towards goals, Motivated for self care and independence, Pleasant and cooperative, Supportive family, Willingly participates in therapeutic activities  Barriers: Bladder retention, Bowel incontinence, Impaired activity tolerance, Impaired balance, Pain, Limited mobility (paraplegia)    Plan    AFO consult  Bed mobility- per MD ok to go sit>longsit>supine  Stand step transfers FWW with second person assist, squat pivot if 1 person  Gait FWW with vector vs overground without body weight support and WC follow  WC mobility indoors and outdoors     Passport items to be completed:  Get in/out of bed safely, in/out of a vehicle, safely use mobility device, walk or wheel around home/community, navigate up and down stairs, show how to get up/down from the ground, ensure home is accessible, demonstrate HEP, complete caregiver training        Physical Therapy Problems (Active)       Problem: Balance       Dates: Start:  05/24/23         Goal: STG-Within one week, patient will maintain static standing balance with B UE support and Ambar for 5 minutes to improve endurance and standing tolerance       Dates: Start:  05/24/23    Expected End:  06/08/23         Goal Note filed on 06/01/23 0814 by Mikey Villeda, PT       3 min                 Problem: PT-Long Term Goals       Dates: Start:  05/24/23         Goal: LTG-By discharge, patient will tolerate standing for 10 min and intermittent UE support with SBA in order to facilitate performance of ADLs       Dates:  Start:  05/24/23    Expected End:  06/08/23            Goal: LTG-By discharge, patient will propel wheelchair 150ft [employer]man       Dates: Start:  05/24/23    Expected End:  06/08/23            Goal: LTG-By discharge, patient will ambulate 50ft Ambar using LRAD       Dates: Start:  05/24/23    Expected End:  06/08/23            Goal: LTG-By discharge, patient will transfer one surface to another with CGA       Dates: Start:  05/24/23    Expected End:  06/08/23            Goal: LTG-By discharge, patient will perform home exercise program independently       Dates: Start:  05/24/23    Expected End:  06/08/23            Goal: LTG-By discharge, patient will transfer in/out of a car with modA       Dates: Start:  05/24/23    Expected End:  06/08/23            Goal: LTG-By discharge, patient will perform bed mobility independently       Dates: Start:  05/24/23    Expected End:  06/08/23

## 2023-06-01 NOTE — THERAPY
"Occupational Therapy  Daily Treatment     Patient Name: Bharath Diaz  Age:  42 y.o., Sex:  male  Medical Record #: [id number]  Today's Date: 6/1/2023     Precautions  Precautions: Fall Risk, TLSO (Thoracolumbosacral orthosis), Spinal / Back Precautions   Comments: possible R partial infraspinatus tear; R rib 1,8,9 fxs; T11-L1 posterior fusion; L3 AIS B         Subjective    Pt seen 2x this day, agreeable and motivated to participate in OT tx. \"I will order those today.\" Referring toileting and suppository devices.     Objective       06/01/23 1001 06/01/23 1231   OT Charge Group   OT Self Care / ADL (Units) 3  --    OT Therapy Activity (Units) 1 3   OT Total Time Spent   OT Individual Total Time Spent (Mins) 60 45   Cognition    Level of Consciousness  --  Alert   Functional Level of Assist   Grooming  --  Modified Independent;Seated   Grooming Description  --  Seated in wheelchair at sink  (for oral care. Reviewed home setup and made recommendations for use of a tray/basin and cup for spitting, as pt will not be able to roll under sink at home.)   Bathing Minimal Assist  --    Bathing Description Adaptive equipment;Hand held shower;Long handled bath tool;Assit with back;Tub bench;Assit with perineal;Increased time;Initial preparation for task;Set-up of equipment;Supervision for safety;Verbal cueing  (VC for spinal precautions while seated on fold down bench. Pt able to reach all parts with assist for rear with hip hinge forward only.)  --    Upper Body Dressing Supervision  --    Upper Body Dressing Description Set-up of equipment;Increased time  (setup for TLSO seated on fold down bench. Ind to don/doff pullover shirt)  --    Lower Body Dressing Minimal Assist  --    Lower Body Dressing Description Reacher;Dressing stick;Sock aid;Cues for spinal precautions;Increased time;Set-up of equipment;Supervision for safety  (Min A for don/doff of TEDS only, pt able to complete doff of shorts and UW with bilateral " leaning while seated on shower bench. Supine in bed with elevated HOB for donning with AE and rails for bilateral rolling.)  --    Bed, Chair, Wheelchair Transfer Minimal Assist Minimal Assist   Bed Chair Wheelchair Transfer Description Set-up of equipment;Supervision for safety;Verbal cueing;Squat pivot transfer to wheelchair  (Min A x1 for transfer, second person to assist with w/c stabilization due to poor breaks.) Set-up of equipment;Supervision for safety;Verbal cueing  (Stand step with FWW and bilateral feet wrapped into dorsi-flexion via ACE wraps to reduce foot drop.)   Toilet Transfers  --  Moderate Assist  (to Min A)   Toilet Transfer Description  --  Adaptive equipment;Increased time;Set-up of equipment;Supervision for safety;Verbal cueing  (Mod-Min A for steady in standing while trialing FWW for toilet transfer from w/c. Second person to stabilize w/c due to poor breaks.)   Tub / Shower Transfers Minimal Assist  (Min A x1, second person to assist with w/c stabilization due to poor breaks.)  --    Tub Shower Transfer Description Shower bench;Increased time;Set-up of equipment;Supervision for safety  --    Interdisciplinary Plan of Care Collaboration   IDT Collaboration with  Therapy Tech Physical Therapist   Patient Position at End of Therapy In Bed;Call Light within Reach;Tray Table within Reach Seated;Call Light within Reach   Collaboration Comments assisted with session. Primary PT present at beginning of session to demonstrate stand step transfer using FWW. Tech present for remainder of session     1230 - Educated and trained pt for large intestine external massage, due to recent constipation. Pt able to verbalize understanding and completed teach-back well.   Discussed upcoming goals for community outing with w/c mobility, completing ICP in a public restroom, and possible trial of adaptive bicycle. Further goals to be set.    Assessment    Pt continues to make significant progress towards goals with  self-care and functional transfers. He now has 3 different methods of toilet transfer including, SQPT, stand pivot with GB, and FWW. He currently requires assist with each method, but is progressing with body awareness and reducing speed for safety. Pt verbalized understanding of need to order AE for toileting prior to DC to trial.  Strengths: Able to follow instructions, Alert and oriented, Effective communication skills, Good carryover of learning, Good insight into deficits/needs, Independent prior level of function, Making steady progress towards goals, Manages pain appropriately, Motivated for self care and independence, Pleasant and cooperative, Supportive family, Willingly participates in therapeutic activities  Barriers: Bladder incontinence, Bladder retention, Decreased endurance, Fatigue, Generalized weakness, Impaired activity tolerance, Impaired balance, Limited mobility, Pain    Plan    AE training for LB, sitting balance/functional transfers, BLE NMRE for sensation/strengthening, BUE strengthening without RUE ext rot, begin acquiring pictures and measurements of home setup for DME recommendations.     Passport items to be completed:  Perform bathroom transfers, complete dressing, complete feeding, get ready for the day, prepare a simple meal, participate in household tasks, adapt home for safety needs, demonstrate home exercise program, complete caregiver training    Occupational Therapy Goals (Active)       Problem: Bathing       Dates: Start:  06/01/23         Goal: STG-Within one week, patient will bathe at CGA level using DME/AE PRN.       Dates: Start:  06/01/23               Problem: Dressing       Dates: Start:  06/01/23         Goal: STG-Within one week, patient will dress UB with clothing retrieval at Mod I level, including TLSO don/doff.       Dates: Start:  06/01/23               Problem: Functional Transfers       Dates: Start:  06/01/23         Goal: STG-Within one week, patient will  transfer to toilet at CGA x1 using DME/AE PRN.       Dates: Start:  06/01/23            Goal: STG-Within one week, patient will transfer to tub/shower using tub transfer bench at CGA level using DME PRN.       Dates: Start:  06/01/23               Problem: IADL's       Dates: Start:  06/01/23         Goal: STG-Within one week, patient will access kitchen area at w/c level with SPV using AE PRN.       Dates: Start:  06/01/23               Problem: OT Long Term Goals       Dates: Start:  05/24/23         Goal: LTG-By discharge, patient will complete basic self care tasks at Min A x1-Mod I level using DME/AE PRN.       Dates: Start:  05/24/23    Expected End:  06/08/23            Goal: LTG-By discharge, patient will perform bathroom transfers at Min A x1-Mod I level using DME/AE PRN.       Dates: Start:  05/24/23    Expected End:  06/08/23            Goal: LTG-By discharge, patient will complete basic home management at Min A x1-Mod I level using DME/AE PRN.       Dates: Start:  05/24/23    Expected End:  06/08/23               Problem: Toileting       Dates: Start:  06/01/23         Goal: STG-Within one week, patient will complete toileting tasks at Max A x1 using DME/AE PRN.       Dates: Start:  06/01/23
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
## 2023-06-02 ENCOUNTER — APPOINTMENT (OUTPATIENT)
Dept: OCCUPATIONAL THERAPY | Facility: REHABILITATION | Age: 43
End: 2023-06-02
Attending: PHYSICAL MEDICINE & REHABILITATION
Payer: COMMERCIAL

## 2023-06-02 ENCOUNTER — APPOINTMENT (OUTPATIENT)
Dept: OCCUPATIONAL THERAPY | Facility: REHABILITATION | Age: 43
DRG: 949 | End: 2023-06-02
Attending: PHYSICAL MEDICINE & REHABILITATION
Payer: COMMERCIAL

## 2023-06-02 ENCOUNTER — APPOINTMENT (OUTPATIENT)
Dept: PHYSICAL THERAPY | Facility: REHABILITATION | Age: 43
End: 2023-06-02
Attending: PHYSICAL MEDICINE & REHABILITATION
Payer: COMMERCIAL

## 2023-06-02 LAB
ANION GAP SERPL CALC-SCNC: 12 MMOL/L (ref 7–16)
BUN SERPL-MCNC: 15 MG/DL (ref 8–22)
CALCIUM SERPL-MCNC: 9.4 MG/DL (ref 8.5–10.5)
CHLORIDE SERPL-SCNC: 100 MMOL/L (ref 96–112)
CO2 SERPL-SCNC: 25 MMOL/L (ref 20–33)
CREAT SERPL-MCNC: 0.85 MG/DL (ref 0.5–1.4)
GFR SERPLBLD CREATININE-BSD FMLA CKD-EPI: 110 ML/MIN/1.73 M 2
GLUCOSE SERPL-MCNC: 163 MG/DL (ref 65–99)
POTASSIUM SERPL-SCNC: 4.5 MMOL/L (ref 3.6–5.5)
SODIUM SERPL-SCNC: 137 MMOL/L (ref 135–145)

## 2023-06-02 PROCEDURE — 700101 HCHG RX REV CODE 250: Performed by: PHYSICAL MEDICINE & REHABILITATION

## 2023-06-02 PROCEDURE — 97530 THERAPEUTIC ACTIVITIES: CPT

## 2023-06-02 PROCEDURE — 97116 GAIT TRAINING THERAPY: CPT

## 2023-06-02 PROCEDURE — 700111 HCHG RX REV CODE 636 W/ 250 OVERRIDE (IP): Performed by: PHYSICAL MEDICINE & REHABILITATION

## 2023-06-02 PROCEDURE — 99232 SBSQ HOSP IP/OBS MODERATE 35: CPT | Performed by: PHYSICAL MEDICINE & REHABILITATION

## 2023-06-02 PROCEDURE — 770010 HCHG ROOM/CARE - REHAB SEMI PRIVAT*

## 2023-06-02 PROCEDURE — 36415 COLL VENOUS BLD VENIPUNCTURE: CPT

## 2023-06-02 PROCEDURE — 97535 SELF CARE MNGMENT TRAINING: CPT

## 2023-06-02 PROCEDURE — 97110 THERAPEUTIC EXERCISES: CPT

## 2023-06-02 PROCEDURE — 80048 BASIC METABOLIC PNL TOTAL CA: CPT

## 2023-06-02 PROCEDURE — 700102 HCHG RX REV CODE 250 W/ 637 OVERRIDE(OP): Performed by: PHYSICAL MEDICINE & REHABILITATION

## 2023-06-02 PROCEDURE — A9270 NON-COVERED ITEM OR SERVICE: HCPCS | Performed by: PHYSICAL MEDICINE & REHABILITATION

## 2023-06-02 RX ADMIN — GABAPENTIN 900 MG: 300 CAPSULE ORAL at 20:17

## 2023-06-02 RX ADMIN — GABAPENTIN 900 MG: 300 CAPSULE ORAL at 15:06

## 2023-06-02 RX ADMIN — LIDOCAINE 3 PATCH: 50 PATCH CUTANEOUS at 08:11

## 2023-06-02 RX ADMIN — SENNOSIDES 25.8 MG: 8.6 TABLET, FILM COATED ORAL at 20:17

## 2023-06-02 RX ADMIN — TIZANIDINE 4 MG: 4 TABLET ORAL at 08:16

## 2023-06-02 RX ADMIN — OXYCODONE HYDROCHLORIDE 10 MG: 10 TABLET ORAL at 08:24

## 2023-06-02 RX ADMIN — TESTOSTERONE GEL, 1% 100 MG: 10 GEL TRANSDERMAL at 08:17

## 2023-06-02 RX ADMIN — OMEPRAZOLE 20 MG: 20 CAPSULE, DELAYED RELEASE ORAL at 08:14

## 2023-06-02 RX ADMIN — DOCUSATE SODIUM 200 MG: 100 CAPSULE, LIQUID FILLED ORAL at 08:15

## 2023-06-02 RX ADMIN — DULOXETINE HYDROCHLORIDE 60 MG: 30 CAPSULE, DELAYED RELEASE ORAL at 08:15

## 2023-06-02 RX ADMIN — OXYCODONE HYDROCHLORIDE 10 MG: 10 TABLET ORAL at 11:51

## 2023-06-02 RX ADMIN — TIZANIDINE 4 MG: 4 TABLET ORAL at 20:18

## 2023-06-02 RX ADMIN — BISACODYL 10 MG: 10 SUPPOSITORY RECTAL at 05:24

## 2023-06-02 RX ADMIN — ATORVASTATIN CALCIUM 20 MG: 10 TABLET, FILM COATED ORAL at 20:18

## 2023-06-02 RX ADMIN — ENOXAPARIN SODIUM 40 MG: 100 INJECTION SUBCUTANEOUS at 20:18

## 2023-06-02 RX ADMIN — GABAPENTIN 900 MG: 300 CAPSULE ORAL at 08:14

## 2023-06-02 RX ADMIN — MIDODRINE HYDROCHLORIDE 5 MG: 2.5 TABLET ORAL at 11:52

## 2023-06-02 RX ADMIN — MIDODRINE HYDROCHLORIDE 5 MG: 2.5 TABLET ORAL at 08:13

## 2023-06-02 RX ADMIN — OXYCODONE HYDROCHLORIDE 10 MG: 10 TABLET ORAL at 05:23

## 2023-06-02 RX ADMIN — OXYCODONE HYDROCHLORIDE 10 MG: 10 TABLET ORAL at 17:50

## 2023-06-02 RX ADMIN — TAMSULOSIN HYDROCHLORIDE 0.4 MG: 0.4 CAPSULE ORAL at 17:50

## 2023-06-02 RX ADMIN — MORPHINE SULFATE 30 MG: 30 TABLET, FILM COATED, EXTENDED RELEASE ORAL at 20:18

## 2023-06-02 RX ADMIN — TIZANIDINE 4 MG: 4 TABLET ORAL at 15:06

## 2023-06-02 RX ADMIN — MULTIPLE VITAMINS W/ MINERALS TAB 1 TABLET: TAB at 11:51

## 2023-06-02 RX ADMIN — DOCUSATE SODIUM 200 MG: 100 CAPSULE, LIQUID FILLED ORAL at 20:17

## 2023-06-02 RX ADMIN — Medication 2000 UNITS: at 08:15

## 2023-06-02 ASSESSMENT — PAIN DESCRIPTION - PAIN TYPE
TYPE: ACUTE PAIN;SURGICAL PAIN
TYPE: ACUTE PAIN;SURGICAL PAIN

## 2023-06-02 ASSESSMENT — ACTIVITIES OF DAILY LIVING (ADL)
BED_CHAIR_WHEELCHAIR_TRANSFER_DESCRIPTION: INCREASED TIME;SET-UP OF EQUIPMENT;SQUAT PIVOT TRANSFER TO WHEELCHAIR;SUPERVISION FOR SAFETY;VERBAL CUEING
BED_CHAIR_WHEELCHAIR_TRANSFER_DESCRIPTION: INCREASED TIME;SET-UP OF EQUIPMENT;SUPERVISION FOR SAFETY;VERBAL CUEING

## 2023-06-02 ASSESSMENT — GAIT ASSESSMENTS
DEVIATION: DECREASED BASE OF SUPPORT;SHUFFLED GAIT;DECREASED HEEL STRIKE;DECREASED TOE OFF
GAIT LEVEL OF ASSIST: MINIMAL ASSIST
DISTANCE (FEET): 15
ASSISTIVE DEVICE: PARALLEL BARS
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
## 2023-06-02 NOTE — THERAPY
Physical Therapy   Daily Treatment     Patient Name: Bharath Diaz  Age:  42 y.o., Sex:  male  Medical Record #: [id number]  Today's Date: 6/2/2023     Precautions  Precautions: Fall Risk, TLSO (Thoracolumbosacral orthosis), Spinal / Back Precautions   Comments: possible R partial infraspinatus tear; R rib 1,8,9 fxs; T11-L1 posterior fusion; L3 AIS B    Subjective    Patient is agreeable to participate, is found in bed. Requests assist to get incontinence pad in place before going to therapy.      Objective       06/02/23 0831   PT Charge Group   PT Therapeutic Activities (Units) 2   PT Total Time Spent   PT Individual Total Time Spent (Mins) 30   Transfer Functional Level of Assist   Bed, Chair, Wheelchair Transfer Contact Guard Assist   Bed Chair Wheelchair Transfer Description Increased time;Set-up of equipment;Supervision for safety;Verbal cueing  (from bed to chair, assist for stabilization)   Bed Mobility    Sit to Stand Contact Guard Assist  (CGA to come to standing, Ambar to block at knees to work on trunk stability in standing and standing weight shifting with no UE support)   Interdisciplinary Plan of Care Collaboration   IDT Collaboration with  Physical Therapist   Patient Position at End of Therapy Seated;Call Light within Reach;Tray Table within Reach;Phone within Reach   Collaboration Comments coord c primary PT for POC     Sit to  parallel bars with mini squats x 10 with BUE support. Standing balance with no UE support: eyes open, head turns side to side and up/ down, target finding, standing weight shifts x 3. Total of 7 standing bouts, lasting from 15 seconds initially to more than 45 seconds as his confidence improved.     Assessment    Patient demonstrates improved confidence with standing weight shift with knees blocked with CGA-min assist to focus on trunk control in standing. He tolerated a max of 45 seconds with no UE support with standing weight shifts before needing a seated rest  break. Discussed his frequent falling backwards during balance practice and potential safety of using his back against a wall when pulling pants up if he is unsupervised at home for fall prevention. He is making functional gains, is expected to continue improving during the rest of his stay.    Strengths: Able to follow instructions, Good carryover of learning, Good insight into deficits/needs, Independent prior level of function, Making steady progress towards goals, Motivated for self care and independence, Pleasant and cooperative, Supportive family, Willingly participates in therapeutic activities  Barriers: Bladder retention, Bowel incontinence, Impaired activity tolerance, Impaired balance, Pain, Limited mobility (paraplegia)    Plan    AFO consult  Bed mobility- per MD ok to go sit>longsit>supine  Stand step transfers FWW with second person assist, squat pivot if 1 person  Gait FWW with vector vs overground without body weight support and WC follow  WC mobility indoors and outdoors       Passport items to be completed:  Get in/out of bed safely, in/out of a vehicle, safely use mobility device, walk or wheel around home/community, navigate up and down stairs, show how to get up/down from the ground, ensure home is accessible, demonstrate HEP, complete caregiver training    Physical Therapy Problems (Active)       Problem: Balance       Dates: Start:  05/24/23         Goal: STG-Within one week, patient will maintain static standing balance with B UE support and Ambar for 5 minutes to improve endurance and standing tolerance       Dates: Start:  05/24/23    Expected End:  06/08/23         Goal Note filed on 06/01/23 0814 by Mikey Villeda, PT       3 min                 Problem: PT-Long Term Goals       Dates: Start:  05/24/23         Goal: LTG-By discharge, patient will tolerate standing for 10 min and intermittent UE support with SBA in order to facilitate performance of ADLs       Dates: Start:  05/24/23     Expected End:  06/08/23            Goal: LTG-By discharge, patient will propel wheelchair 150ft Bossman       Dates: Start:  05/24/23    Expected End:  06/08/23            Goal: LTG-By discharge, patient will ambulate 50ft Ambar using LRAD       Dates: Start:  05/24/23    Expected End:  06/08/23            Goal: LTG-By discharge, patient will transfer one surface to another with CGA       Dates: Start:  05/24/23    Expected End:  06/08/23            Goal: LTG-By discharge, patient will perform home exercise program independently       Dates: Start:  05/24/23    Expected End:  06/08/23            Goal: LTG-By discharge, patient will transfer in/out of a car with modA       Dates: Start:  05/24/23    Expected End:  06/08/23            Goal: LTG-By discharge, patient will perform bed mobility independently       Dates: Start:  05/24/23    Expected End:  06/08/23

## 2023-06-02 NOTE — THERAPY
"Occupational Therapy  Daily Treatment     Patient Name: Bharath Diaz  Age:  42 y.o., Sex:  male  Medical Record #: [id number]  Today's Date: 6/2/2023     Precautions  Precautions: Fall Risk, TLSO (Thoracolumbosacral orthosis), Spinal / Back Precautions   Comments: possible R partial infraspinatus tear; R rib 1,8,9 fxs; T11-L1 posterior fusion; L3 AIS B         Subjective  \"I really want to work on getting up off the floor.\"     Objective       06/02/23 1301   OT Charge Group   OT Self Care / ADL (Units) 1   OT Therapy Activity (Units) 1   OT Total Time Spent   OT Individual Total Time Spent (Mins) 30   Functional Level of Assist   Toileting Total Assist x 2   Bed, Chair, Wheelchair Transfer Minimal Assist   Bed Chair Wheelchair Transfer Description Increased time;Set-up of equipment;Squat pivot transfer to wheelchair;Supervision for safety;Verbal cueing   Toilet Transfers Moderate Assist  (2 people present for safety; 1  to assist with w/c mgmt due to brakes not locking well and other person to assist with balance)   Bed Mobility    Supine to Sit Standby Assist   Scooting Supervised   Interdisciplinary Plan of Care Collaboration   Patient Position at End of Therapy Seated;Call Light within Reach  (pt left on toilet with torsten in front of pt and CNA notified)         Assessment    Pt tolerated session well. Pt completed squat pivot xfer with CGA-min a for safety. Pt engaged in toilet xfer's trialing squat pivot vs stand pivot. Noted pt impulsive at times and needed cues for making sure to stand up all the way prior to trying to move. Pt completed squat pivot xfer with min-CGA, required mod a for stand pivot xfer. 2 people needed for safety during toileting to doff/don pants in standing and assist with balance and w/c mgmt due to R brake not locking all the way. Pt reported that he has not ordered AE for home for LBD yet and plans to do so once he d/c due to too many things on his plate at this moment. Will " continue to educate pt on the importance of ordering items prior to d/c so that they are there to use when he goes home. Pt reported not needing AE for bowel program and was able to use his finger for digital stim this morning.     Strengths: Able to follow instructions, Alert and oriented, Effective communication skills, Good carryover of learning, Good insight into deficits/needs, Independent prior level of function, Making steady progress towards goals, Manages pain appropriately, Motivated for self care and independence, Pleasant and cooperative, Supportive family, Willingly participates in therapeutic activities  Barriers: Bladder incontinence, Bladder retention, Decreased endurance, Fatigue, Generalized weakness, Impaired activity tolerance, Impaired balance, Limited mobility, Pain    Plan    Community outing with rec therapy tomorrow to [employer] adaptive cycling. OT POC goals: w/c<>adaptive cycle transfer, self-cath in public restroom, assess safety with adaptive cycling, UE only vs UE and LE cycling, endurance,  self-monitoring pace, etc.      AE training for LB, sitting balance/functional transfers, BLE NMRE for sensation/strengthening, BUE strengthening without RUE ext rot, begin acquiring pictures and measurements of home setup for DME recommendations.     Passport items to be completed:  Perform bathroom transfers, complete dressing, complete feeding, get ready for the day, prepare a simple meal, participate in household tasks, adapt home for safety needs, demonstrate home exercise program, complete caregiver training     Occupational Therapy Goals (Active)       Problem: Bathing       Dates: Start:  06/01/23         Goal: STG-Within one week, patient will bathe at CGA level using DME/AE PRN.       Dates: Start:  06/01/23               Problem: Dressing       Dates: Start:  06/01/23         Goal: STG-Within one week, patient will dress UB with clothing retrieval at Mod I level, including TLSO don/doff.        Dates: Start:  06/01/23               Problem: Functional Transfers       Dates: Start:  06/01/23         Goal: STG-Within one week, patient will transfer to toilet at CGA x1 using DME/AE PRN.       Dates: Start:  06/01/23            Goal: STG-Within one week, patient will transfer to tub/shower using tub transfer bench at CGA level using DME PRN.       Dates: Start:  06/01/23               Problem: IADL's       Dates: Start:  06/01/23         Goal: STG-Within one week, patient will access kitchen area at w/c level with SPV using AE PRN.       Dates: Start:  06/01/23               Problem: OT Long Term Goals       Dates: Start:  05/24/23         Goal: LTG-By discharge, patient will complete basic self care tasks at Min A x1-Mod I level using DME/AE PRN.       Dates: Start:  05/24/23    Expected End:  06/08/23            Goal: LTG-By discharge, patient will perform bathroom transfers at Min A x1-Mod I level using DME/AE PRN.       Dates: Start:  05/24/23    Expected End:  06/08/23            Goal: LTG-By discharge, patient will complete basic home management at Min A x1-Mod I level using DME/AE PRN.       Dates: Start:  05/24/23    Expected End:  06/08/23               Problem: Toileting       Dates: Start:  06/01/23         Goal: STG-Within one week, patient will complete toileting tasks at Max A x1 using DME/AE PRN.       Dates: Start:  06/01/23

## 2023-06-02 NOTE — THERAPY
Recreational Therapy  Daily Treatment     Patient Name: Bharath Diaz  AGE:  42 y.o., SEX:  male  Medical Record #: [id number]  Today's Date: 6/1/2023       Subjective    Patient ready and agreeable to recreation therapy session.      Objective       06/01/23 1501   Procedural Tracking   Procedural Tracking Community Re-Integration;Community Skills Development;Leisure Skills Awareness;Leisure Skills Development;Gross Motor Functional Leisure Skills;Fine Motor Functional Leisure Skills;Group Treatment;Social Skills Training;Cognitive Skills Training   Treatment Time   Total Time Spent (mins) 30   Total Time Missed 0   Functional Ability Status - Cognitive   Attention Span Remains on Task   Comprehension Follows One Step Commands;Follows Two Step Commands;Follows Three Step Commands   Judgment Able to Make Independent Decisions   Functional Ability Status - Emotional    Affect Appropriate   Mood Appropriate   Behavior Appropriate;Cooperative   Skilled Intervention    Skilled Intervention Gross Motor Leisure;Fine Motor Leisure;Cognitive Leisure;Community Skills;Leisure Education    Skilled Intervention Comments outing prep, lisa   Interdisciplinary Plan of Care Collaboration   IDT Collaboration with  Physical Therapist   Patient Position at End of Therapy Other (Comments)  (with PT)   Strengths & Barriers   Strengths Able to follow instructions;Adequate strength;Alert and oriented;Effective communication skills;Good carryover of learning;Willingly participates in therapeutic activities;Pleasant and cooperative;Motivated for self care and independence;Making steady progress towards goals;Good insight into deficits/needs   Barriers Decreased endurance;Impaired activity tolerance;Fatigue;Generalized weakness;Impaired balance;Limited mobility         Assessment    Patient and CTRS discussed plan for community outing on Saturday to [employer]. Patient and CTRS discussed goals for the outing (both OT and RecTx  goals), as well as how to prepare and pack for the day. Patient expressed an understanding of all information.     Patient participated in new game, [employer], during recreation therapy session. Patient required min cues for attention. Patient was mod I for fine motor aspects of game play, strategy, and processing.     Strengths: (P) Able to follow instructions, Adequate strength, Alert and oriented, Effective communication skills, Good carryover of learning, Willingly participates in therapeutic activities, Pleasant and cooperative, Motivated for self care and independence, Making steady progress towards goals, Good insight into deficits/needs  Barriers: (P) Decreased endurance, Impaired activity tolerance, Fatigue, Generalized weakness, Impaired balance, Limited mobility    Plan    Patient will benefit from continued recreation therapy sessions.     Passport items to be completed:  Verbalize two positive leisure activities, discuss returning to work, hobbies, community groups or volunteer activities, explore community resources

## 2023-06-02 NOTE — DISCHARGE PLANNING
CM.   2nd phone call to HR dept/ [employer] @ [phone] re pt's  STD and FMLA; not available; left message.     [employer] placard application completed and faxed to [facility] as pt was able to get copy of his 's license.     Pt has appt w/ Dr Barger Neurosurgeon on 6/8 @ 3:00pm with a 2:30 check in time.

## 2023-06-02 NOTE — PROGRESS NOTES
Transfer Level & Assistive Devices Used: CGA with niesha steady    Patient Position: regular toilet     Adaptive Equipment Used? none     Patient Sensation and Bowel Urgency Present? yes     Incontinence? no     BM Results: none    1. Caregiver Present and actively participating in training? no    2. Patient Demonstrated Understanding with Bowel Medications and Purpose: yes     3. Patient Participation with Suppository Placement: yes     4. Patient Participation with Digital Stimulation: yes 1/3     5. Patient Participation with Clothing Management: yes     6. Patient Participation with Hygiene: yes        (If patient meets all 6 of the criteria numbered above, consider rescheduling bowel program to evening 1700 or 0500.)      Other:

## 2023-06-02 NOTE — THERAPY
Occupational Therapy  Daily Treatment     Patient Name: Bharath Diaz  Age:  42 y.o., Sex:  male  Medical Record #: [id number]  Today's Date: 6/2/2023     Precautions  Precautions: Fall Risk, TLSO (Thoracolumbosacral orthosis), Spinal / Back Precautions   Comments: possible R partial infraspinatus tear; R rib 1,8,9 fxs; T11-L1 posterior fusion; L3 AIS B         Subjective    Pt up in w/c finishing with PT upon arrival, agreeable to OT session.      Objective     06/02/23 1031   OT Charge Group   OT Therapeutic Exercise (Units) 4   OT Total Time Spent   OT Individual Total Time Spent (Mins) 60   Sitting Upper Body Exercises   Chest Press 3 sets of 10;Bilateral;Weight (See Comments for lbs)  (15#, 20#, 25#)   Bilateral Row 3 sets of 10;Bilateral;Weight (See Comments for lbs)  (40#, 45#, 50#)   Tricep Press 3 sets of 10;Bilateral;Weight (See Comments for lbs)  (40#, 45#, 50# facing fwd on [facility])   Upper Extremity Bike Level 1 Resistance  (Hydrocycle 10 min with one RB to progress UE strength and endurance)   Interdisciplinary Plan of Care Collaboration   IDT Collaboration with  Physical Therapist;Occupational Therapist   Patient Position at End of Therapy Seated;Other (Comments)  (in cafeteria at end of session)   Collaboration Comments Handoff from PT; POC discussed with primary OT     Outdoor w/c mob to progress UE strength and endurance to propel on uneven surfaces and slopes, Ambar for uphill and curb cut outs     Education provided on vehicle adaptations for hand controls, pending progress with LE motor control and long term expectations     Assessment    Pt tolerated UE exercises well to progress strength and endurance for transfers and functional daily activities. Had no c/o pain or discomfort throughout. Encouraged hydration throughout and for the remainder of the day. Needs assist for w/c mob on slopes and curb cut outs for safety to prevent backwards tipping.     Strengths: Able to follow instructions,  Alert and oriented, Effective communication skills, Good carryover of learning, Good insight into deficits/needs, Independent prior level of function, Making steady progress towards goals, Manages pain appropriately, Motivated for self care and independence, Pleasant and cooperative, Supportive family, Willingly participates in therapeutic activities  Barriers: Bladder incontinence, Bladder retention, Decreased endurance, Fatigue, Generalized weakness, Impaired activity tolerance, Impaired balance, Limited mobility, Pain    Plan    Community outing with rec therapy tomorrow to [place] adaptive cycling. OT POC goals: w/c<>adaptive cycle transfer, self-cath in public restroom, assess safety with adaptive cycling, UE only vs UE and LE cycling, endurance,  self-monitoring pace, etc.     AE training for LB, sitting balance/functional transfers, BLE NMRE for sensation/strengthening, BUE strengthening without RUE ext rot, begin acquiring pictures and measurements of home setup for DME recommendations.    Passport items to be completed:  Perform bathroom transfers, complete dressing, complete feeding, get ready for the day, prepare a simple meal, participate in household tasks, adapt home for safety needs, demonstrate home exercise program, complete caregiver training     Occupational Therapy Goals (Active)       Problem: Bathing       Dates: Start:  06/01/23         Goal: STG-Within one week, patient will bathe at CGA level using DME/AE PRN.       Dates: Start:  06/01/23               Problem: Dressing       Dates: Start:  06/01/23         Goal: STG-Within one week, patient will dress UB with clothing retrieval at Mod I level, including TLSO don/doff.       Dates: Start:  06/01/23               Problem: Functional Transfers       Dates: Start:  06/01/23         Goal: STG-Within one week, patient will transfer to toilet at CGA x1 using DME/AE PRN.       Dates: Start:  06/01/23            Goal: STG-Within one week, patient will  transfer to tub/shower using tub transfer bench at CGA level using DME PRN.       Dates: Start:  06/01/23               Problem: IADL's       Dates: Start:  06/01/23         Goal: STG-Within one week, patient will access kitchen area at w/c level with SPV using AE PRN.       Dates: Start:  06/01/23               Problem: OT Long Term Goals       Dates: Start:  05/24/23         Goal: LTG-By discharge, patient will complete basic self care tasks at Min A x1-Mod I level using DME/AE PRN.       Dates: Start:  05/24/23    Expected End:  06/08/23            Goal: LTG-By discharge, patient will perform bathroom transfers at Min A x1-Mod I level using DME/AE PRN.       Dates: Start:  05/24/23    Expected End:  06/08/23            Goal: LTG-By discharge, patient will complete basic home management at Min A x1-Mod I level using DME/AE PRN.       Dates: Start:  05/24/23    Expected End:  06/08/23               Problem: Toileting       Dates: Start:  06/01/23         Goal: STG-Within one week, patient will complete toileting tasks at Max A x1 using DME/AE PRN.       Dates: Start:  06/01/23
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
## 2023-06-03 ENCOUNTER — APPOINTMENT (OUTPATIENT)
Dept: PHYSICAL THERAPY | Facility: REHABILITATION | Age: 43
DRG: 949 | End: 2023-06-03
Attending: PHYSICAL MEDICINE & REHABILITATION
Payer: COMMERCIAL

## 2023-06-03 ENCOUNTER — APPOINTMENT (OUTPATIENT)
Dept: OCCUPATIONAL THERAPY | Facility: REHABILITATION | Age: 43
DRG: 949 | End: 2023-06-03
Attending: PHYSICAL MEDICINE & REHABILITATION
Payer: COMMERCIAL

## 2023-06-03 PROCEDURE — 97537 COMMUNITY/WORK REINTEGRATION: CPT

## 2023-06-03 PROCEDURE — 700101 HCHG RX REV CODE 250: Performed by: PHYSICAL MEDICINE & REHABILITATION

## 2023-06-03 PROCEDURE — 770010 HCHG ROOM/CARE - REHAB SEMI PRIVAT*

## 2023-06-03 PROCEDURE — 700102 HCHG RX REV CODE 250 W/ 637 OVERRIDE(OP): Performed by: PHYSICAL MEDICINE & REHABILITATION

## 2023-06-03 PROCEDURE — A9270 NON-COVERED ITEM OR SERVICE: HCPCS | Performed by: PHYSICAL MEDICINE & REHABILITATION

## 2023-06-03 PROCEDURE — 700111 HCHG RX REV CODE 636 W/ 250 OVERRIDE (IP): Performed by: PHYSICAL MEDICINE & REHABILITATION

## 2023-06-03 RX ADMIN — TIZANIDINE 4 MG: 4 TABLET ORAL at 20:47

## 2023-06-03 RX ADMIN — OXYCODONE HYDROCHLORIDE 10 MG: 10 TABLET ORAL at 11:52

## 2023-06-03 RX ADMIN — LIDOCAINE 3 PATCH: 50 PATCH CUTANEOUS at 08:18

## 2023-06-03 RX ADMIN — TIZANIDINE 4 MG: 4 TABLET ORAL at 15:20

## 2023-06-03 RX ADMIN — OXYCODONE HYDROCHLORIDE 10 MG: 10 TABLET ORAL at 04:43

## 2023-06-03 RX ADMIN — TAMSULOSIN HYDROCHLORIDE 0.4 MG: 0.4 CAPSULE ORAL at 17:55

## 2023-06-03 RX ADMIN — DOCUSATE SODIUM 200 MG: 100 CAPSULE, LIQUID FILLED ORAL at 20:47

## 2023-06-03 RX ADMIN — DOCUSATE SODIUM 200 MG: 100 CAPSULE, LIQUID FILLED ORAL at 08:19

## 2023-06-03 RX ADMIN — BISACODYL 10 MG: 10 SUPPOSITORY RECTAL at 05:00

## 2023-06-03 RX ADMIN — GABAPENTIN 900 MG: 300 CAPSULE ORAL at 15:20

## 2023-06-03 RX ADMIN — OMEPRAZOLE 20 MG: 20 CAPSULE, DELAYED RELEASE ORAL at 08:19

## 2023-06-03 RX ADMIN — SENNOSIDES 25.8 MG: 8.6 TABLET, FILM COATED ORAL at 20:46

## 2023-06-03 RX ADMIN — OXYCODONE HYDROCHLORIDE 10 MG: 10 TABLET ORAL at 08:18

## 2023-06-03 RX ADMIN — GABAPENTIN 900 MG: 300 CAPSULE ORAL at 20:42

## 2023-06-03 RX ADMIN — TIZANIDINE 4 MG: 4 TABLET ORAL at 08:19

## 2023-06-03 RX ADMIN — OXYCODONE HYDROCHLORIDE 10 MG: 10 TABLET ORAL at 15:23

## 2023-06-03 RX ADMIN — GABAPENTIN 900 MG: 300 CAPSULE ORAL at 08:19

## 2023-06-03 RX ADMIN — DULOXETINE HYDROCHLORIDE 60 MG: 30 CAPSULE, DELAYED RELEASE ORAL at 08:22

## 2023-06-03 RX ADMIN — TESTOSTERONE GEL, 1% 100 MG: 10 GEL TRANSDERMAL at 08:19

## 2023-06-03 RX ADMIN — ENOXAPARIN SODIUM 40 MG: 100 INJECTION SUBCUTANEOUS at 20:47

## 2023-06-03 RX ADMIN — Medication 2000 UNITS: at 08:19

## 2023-06-03 RX ADMIN — MULTIPLE VITAMINS W/ MINERALS TAB 1 TABLET: TAB at 11:51

## 2023-06-03 RX ADMIN — ATORVASTATIN CALCIUM 20 MG: 10 TABLET, FILM COATED ORAL at 20:47

## 2023-06-03 RX ADMIN — MORPHINE SULFATE 30 MG: 30 TABLET, FILM COATED, EXTENDED RELEASE ORAL at 20:47

## 2023-06-03 ASSESSMENT — PAIN DESCRIPTION - PAIN TYPE: TYPE: ACUTE PAIN;SURGICAL PAIN
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
## 2023-06-03 NOTE — PROGRESS NOTES
Transfer Level & Assistive Devices Used: SBA with Nerissa Steady    Patient Position: regular toilet     Adaptive Equipment Used? None     Patient Sensation and Bowel Urgency Present? Yes       Incontinence? no     BM Results: Medium    1. Caregiver Present and actively participating in training? no    2. Patient Demonstrated Understanding with Bowel Medications and Purpose: yes     3. Patient Participation with Suppository Placement: yes     4. Patient Participation with Digital Stimulation: yes     5. Patient Participation with Clothing Management: yes     6. Patient Participation with Hygiene: yes        (If patient meets all 6 of the criteria numbered above, consider rescheduling bowel program to evening 1700 or 0500.)      Other:
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
## 2023-06-04 ENCOUNTER — APPOINTMENT (OUTPATIENT)
Dept: PHYSICAL THERAPY | Facility: REHABILITATION | Age: 43
DRG: 949 | End: 2023-06-04
Attending: PHYSICAL MEDICINE & REHABILITATION
Payer: COMMERCIAL

## 2023-06-04 ENCOUNTER — APPOINTMENT (OUTPATIENT)
Dept: PHYSICAL THERAPY | Facility: REHABILITATION | Age: 43
End: 2023-06-04
Payer: COMMERCIAL

## 2023-06-04 PROCEDURE — 770010 HCHG ROOM/CARE - REHAB SEMI PRIVAT*

## 2023-06-04 PROCEDURE — 97110 THERAPEUTIC EXERCISES: CPT

## 2023-06-04 PROCEDURE — 97530 THERAPEUTIC ACTIVITIES: CPT

## 2023-06-04 PROCEDURE — A9270 NON-COVERED ITEM OR SERVICE: HCPCS | Performed by: PHYSICAL MEDICINE & REHABILITATION

## 2023-06-04 PROCEDURE — 97116 GAIT TRAINING THERAPY: CPT

## 2023-06-04 PROCEDURE — 700101 HCHG RX REV CODE 250: Performed by: PHYSICAL MEDICINE & REHABILITATION

## 2023-06-04 PROCEDURE — 700111 HCHG RX REV CODE 636 W/ 250 OVERRIDE (IP): Performed by: PHYSICAL MEDICINE & REHABILITATION

## 2023-06-04 PROCEDURE — 700102 HCHG RX REV CODE 250 W/ 637 OVERRIDE(OP): Performed by: PHYSICAL MEDICINE & REHABILITATION

## 2023-06-04 RX ADMIN — SENNOSIDES 25.8 MG: 8.6 TABLET, FILM COATED ORAL at 20:10

## 2023-06-04 RX ADMIN — OXYCODONE HYDROCHLORIDE 10 MG: 10 TABLET ORAL at 18:09

## 2023-06-04 RX ADMIN — DULOXETINE HYDROCHLORIDE 60 MG: 30 CAPSULE, DELAYED RELEASE ORAL at 08:23

## 2023-06-04 RX ADMIN — OXYCODONE HYDROCHLORIDE 10 MG: 10 TABLET ORAL at 08:23

## 2023-06-04 RX ADMIN — BISACODYL 10 MG: 10 SUPPOSITORY RECTAL at 05:18

## 2023-06-04 RX ADMIN — DOCUSATE SODIUM 200 MG: 100 CAPSULE, LIQUID FILLED ORAL at 08:23

## 2023-06-04 RX ADMIN — OXYCODONE HYDROCHLORIDE 10 MG: 10 TABLET ORAL at 15:11

## 2023-06-04 RX ADMIN — OMEPRAZOLE 20 MG: 20 CAPSULE, DELAYED RELEASE ORAL at 08:23

## 2023-06-04 RX ADMIN — MULTIPLE VITAMINS W/ MINERALS TAB 1 TABLET: TAB at 11:35

## 2023-06-04 RX ADMIN — ENOXAPARIN SODIUM 40 MG: 100 INJECTION SUBCUTANEOUS at 20:06

## 2023-06-04 RX ADMIN — GABAPENTIN 900 MG: 300 CAPSULE ORAL at 20:06

## 2023-06-04 RX ADMIN — TIZANIDINE 4 MG: 4 TABLET ORAL at 08:23

## 2023-06-04 RX ADMIN — Medication 2000 UNITS: at 08:23

## 2023-06-04 RX ADMIN — MORPHINE SULFATE 30 MG: 30 TABLET, FILM COATED, EXTENDED RELEASE ORAL at 20:07

## 2023-06-04 RX ADMIN — OXYCODONE HYDROCHLORIDE 10 MG: 10 TABLET ORAL at 11:35

## 2023-06-04 RX ADMIN — TAMSULOSIN HYDROCHLORIDE 0.4 MG: 0.4 CAPSULE ORAL at 18:09

## 2023-06-04 RX ADMIN — TIZANIDINE 4 MG: 4 TABLET ORAL at 15:12

## 2023-06-04 RX ADMIN — GABAPENTIN 900 MG: 300 CAPSULE ORAL at 15:12

## 2023-06-04 RX ADMIN — TESTOSTERONE GEL, 1% 100 MG: 10 GEL TRANSDERMAL at 08:23

## 2023-06-04 RX ADMIN — OXYCODONE HYDROCHLORIDE 10 MG: 10 TABLET ORAL at 04:30

## 2023-06-04 RX ADMIN — LIDOCAINE 3 PATCH: 50 PATCH CUTANEOUS at 08:22

## 2023-06-04 RX ADMIN — TIZANIDINE 4 MG: 4 TABLET ORAL at 20:07

## 2023-06-04 RX ADMIN — ATORVASTATIN CALCIUM 20 MG: 10 TABLET, FILM COATED ORAL at 20:06

## 2023-06-04 RX ADMIN — DOCUSATE SODIUM 200 MG: 100 CAPSULE, LIQUID FILLED ORAL at 20:06

## 2023-06-04 RX ADMIN — GABAPENTIN 900 MG: 300 CAPSULE ORAL at 08:23

## 2023-06-04 ASSESSMENT — GAIT ASSESSMENTS
DEVIATION: DECREASED HEEL STRIKE;DECREASED BASE OF SUPPORT
DISTANCE (FEET): 30
ASSISTIVE DEVICE: FRONT WHEEL WALKER
GAIT LEVEL OF ASSIST: CONTACT GUARD ASSIST

## 2023-06-04 ASSESSMENT — FIBROSIS 4 INDEX: FIB4 SCORE: 0.32

## 2023-06-04 NOTE — THERAPY
Physical Therapy   Daily Treatment     Patient Name: Bharath Diaz  Age:  42 y.o., Sex:  male  Medical Record #: [id number]  Today's Date: 6/4/2023     Precautions  Precautions: Fall Risk, TLSO (Thoracolumbosacral orthosis), Spinal / Back Precautions   Comments: possible R partial infraspinatus tear; R rib 1,8,9 fxs; T11-L1 posterior fusion; L3 AIS B    Subjective    Patient motivated to work on ambulation, admits he needs to think about what he would do if he fell out of wheelchair; reports he had a great time on community outing yesterday after being hesitant to go     Objective       06/04/23 0931   PT Charge Group   PT Gait Training (Units) 2   PT Therapeutic Exercise (Units) 1   PT Therapeutic Activities (Units) 1   PT Total Time Spent   PT Individual Total Time Spent (Mins) 60   Precautions   Precautions Fall Risk;TLSO (Thoracolumbosacral orthosis);Spinal / Back Precautions    Comments possible R partial infraspinatus tear; R rib 1,8,9 fxs; T11-L1 posterior fusion; L3 AIS B   Gait Functional Level of Assist    Gait Level Of Assist Contact Guard Assist   Assistive Device Front Wheel Walker  (bariatric FWW)   Distance (Feet) 30   # of Times Distance was Traveled 4   Deviation Decreased Heel Strike;Decreased Base Of Support  (right LE occasional scissor steps)   Wheelchair Functional Level of Assist   Wheelchair Description   (adjusted back rest angle, tighten brakes)   Transfer Functional Level of Assist   Bed, Chair, Wheelchair Transfer Standby Assist  (lateral scoot between 2 level surfaces)   Bed Chair Wheelchair Transfer Description   (tech assist to hold w/c in place)   Supine Lower Body Exercise   Other Exercises gait belt stretches 30sec each bilateral- hamstrings, groin, ITB   Bed Mobility    Supine to Sit Standby Assist   Sit to Supine Standby Assist   Interdisciplinary Plan of Care Collaboration   IDT Collaboration with  Therapy Tech       Assessment    4 bouts of 30ft ambulation with FWW CGA with  w/c follow- self-limits distance with fatigue, right scissor steps increase with fatigue, increased cueing for step length as fatigues    Strengths: Able to follow instructions, Good carryover of learning, Good insight into deficits/needs, Independent prior level of function, Making steady progress towards goals, Motivated for self care and independence, Pleasant and cooperative, Supportive family, Willingly participates in therapeutic activities  Barriers: Bladder retention, Bowel incontinence, Impaired activity tolerance, Impaired balance, Pain, Limited mobility (paraplegia)    Plan    Bed mobility- per MD ok to go sit>longsit>supine  Stand step transfers FWW with second person assist, squat pivot if 1 person  Gait FWW with WC follow- waiting until family arrives Tuesday for footwear to trial custom AFOs  WC mobility indoors and outdoors  Trial  LE to address timing and coordination with stepping  Continue mat program for core and LE ROM and strengthening    Passport items to be completed:  Get in/out of bed safely, in/out of a vehicle, safely use mobility device, walk or wheel around home/community, navigate up and down stairs, show how to get up/down from the ground, ensure home is accessible, demonstrate HEP, complete caregiver training     Physical Therapy Problems (Active)       Problem: Balance       Dates: Start:  05/24/23         Goal: STG-Within one week, patient will maintain static standing balance with B UE support and Ambar for 5 minutes to improve endurance and standing tolerance       Dates: Start:  05/24/23    Expected End:  06/08/23         Goal Note filed on 06/01/23 0814 by Mikey Villeda, PT       3 min                 Problem: PT-Long Term Goals       Dates: Start:  05/24/23         Goal: LTG-By discharge, patient will tolerate standing for 10 min and intermittent UE support with SBA in order to facilitate performance of ADLs       Dates: Start:  05/24/23    Expected End:  06/08/23             Goal: LTG-By discharge, patient will propel wheelchair 150ft Bossman       Dates: Start:  05/24/23    Expected End:  06/08/23            Goal: LTG-By discharge, patient will ambulate 50ft Ambar using LRAD       Dates: Start:  05/24/23    Expected End:  06/08/23            Goal: LTG-By discharge, patient will transfer one surface to another with CGA       Dates: Start:  05/24/23    Expected End:  06/08/23            Goal: LTG-By discharge, patient will perform home exercise program independently       Dates: Start:  05/24/23    Expected End:  06/08/23            Goal: LTG-By discharge, patient will transfer in/out of a car with modA       Dates: Start:  05/24/23    Expected End:  06/08/23            Goal: LTG-By discharge, patient will perform bed mobility independently       Dates: Start:  05/24/23    Expected End:  06/08/23

## 2023-06-04 NOTE — PROGRESS NOTES
Transfer Level & Assistive Devices Used: SBA with niesha steady     Patient Position:  regular toilet     Adaptive Equipment Used? No            Patient Sensation and Bowel Urgency Present? yes     Incontinence? no     BM Results: no    1. Caregiver Present and actively participating in training? no    2. Patient Demonstrated Understanding with Bowel Medications and Purpose: yes     3. Patient Participation with Suppository Placement: yes     4. Patient Participation with Digital Stimulation: yes     5. Patient Participation with Clothing Management: yes     6. Patient Participation with Hygiene: yes        (If patient meets all 6 of the criteria numbered above, consider rescheduling bowel program to evening 1700 or 0500.)      Other:

## 2023-06-04 NOTE — THERAPY
Recreational Therapy  Daily Treatment     Patient Name: Bharath Diaz  AGE:  42 y.o., SEX:  male  Medical Record #: [id number]  Today's Date: 6/4/2023       Subjective    Patient ready and agreeable to recreation therapy session.      Objective       06/04/23 1401   Procedural Tracking   Procedural Tracking Community Re-Integration;Community Skills Development;Leisure Skills Awareness;Leisure Skills Development;Social Skills Training;Cognitive Skills Training;Gross Motor Functional Leisure Skills;Fine Motor Functional Leisure Skills;Group Treatment   Treatment Time   Total Time Spent (mins) 60   Total Time Missed 0   Functional Ability Status - Cognitive   Attention Span Remains on Task   Comprehension Follows One Step Commands;Follows Two Step Commands   Judgment Able to Make Independent Decisions   Functional Ability Status - Emotional    Affect Appropriate   Mood Appropriate   Behavior Appropriate;Cooperative   Skilled Intervention    Skilled Intervention Gross Motor Leisure;Fine Motor Leisure;Cognitive Leisure;Relaxation / Coping Skills;Leisure Education ;Community Skills;Social Skills   Skilled Intervention Comments dc info, stretching   Interdisciplinary Plan of Care Collaboration   Patient Position at End of Therapy Seated   Strengths & Barriers   Strengths Able to follow instructions;Alert and oriented;Effective communication skills;Willingly participates in therapeutic activities;Pleasant and cooperative;Motivated for self care and independence;Good insight into deficits/needs;Independent prior level of function;Making steady progress towards goals   Barriers Decreased endurance;Fatigue;Generalized weakness;Impaired activity tolerance;Impaired balance;Limited mobility;Pain         Assessment    Patient participated in 30 min of stretching to loosen up from cycling yesterday. Patient laid on mat table and stretched both lower and upper body. Patient received 30 min of dc information. Patient open to all  education and expressed an understanding of all materials.     Strengths: (P) Able to follow instructions, Alert and oriented, Effective communication skills, Willingly participates in therapeutic activities, Pleasant and cooperative, Motivated for self care and independence, Good insight into deficits/needs, Independent prior level of function, Making steady progress towards goals  Barriers: (P) Decreased endurance, Fatigue, Generalized weakness, Impaired activity tolerance, Impaired balance, Limited mobility, Pain    Plan    Patient will benefit from continued recreation therapy sessions.     Passport items to be completed:  Verbalize two positive leisure activities, discuss returning to work, hobbies, community groups or volunteer activities, explore community resources

## 2023-06-05 ENCOUNTER — APPOINTMENT (OUTPATIENT)
Dept: PHYSICAL THERAPY | Facility: REHABILITATION | Age: 43
DRG: 949 | End: 2023-06-05
Attending: PHYSICAL MEDICINE & REHABILITATION
Payer: COMMERCIAL

## 2023-06-05 ENCOUNTER — APPOINTMENT (OUTPATIENT)
Dept: OCCUPATIONAL THERAPY | Facility: REHABILITATION | Age: 43
DRG: 949 | End: 2023-06-05
Attending: PHYSICAL MEDICINE & REHABILITATION
Payer: COMMERCIAL

## 2023-06-05 ENCOUNTER — APPOINTMENT (OUTPATIENT)
Dept: RADIOLOGY | Facility: REHABILITATION | Age: 43
DRG: 949 | End: 2023-06-05
Attending: PHYSICAL MEDICINE & REHABILITATION
Payer: COMMERCIAL

## 2023-06-05 LAB
ANION GAP SERPL CALC-SCNC: 12 MMOL/L (ref 7–16)
BUN SERPL-MCNC: 13 MG/DL (ref 8–22)
CALCIUM SERPL-MCNC: 9.1 MG/DL (ref 8.5–10.5)
CHLORIDE SERPL-SCNC: 103 MMOL/L (ref 96–112)
CO2 SERPL-SCNC: 26 MMOL/L (ref 20–33)
CREAT SERPL-MCNC: 0.9 MG/DL (ref 0.5–1.4)
GFR SERPLBLD CREATININE-BSD FMLA CKD-EPI: 108 ML/MIN/1.73 M 2
GLUCOSE SERPL-MCNC: 122 MG/DL (ref 65–99)
POTASSIUM SERPL-SCNC: 4.1 MMOL/L (ref 3.6–5.5)
SODIUM SERPL-SCNC: 141 MMOL/L (ref 135–145)

## 2023-06-05 PROCEDURE — 770010 HCHG ROOM/CARE - REHAB SEMI PRIVAT*

## 2023-06-05 PROCEDURE — 97112 NEUROMUSCULAR REEDUCATION: CPT

## 2023-06-05 PROCEDURE — 700101 HCHG RX REV CODE 250: Performed by: PHYSICAL MEDICINE & REHABILITATION

## 2023-06-05 PROCEDURE — 97535 SELF CARE MNGMENT TRAINING: CPT

## 2023-06-05 PROCEDURE — 80048 BASIC METABOLIC PNL TOTAL CA: CPT

## 2023-06-05 PROCEDURE — 700102 HCHG RX REV CODE 250 W/ 637 OVERRIDE(OP): Performed by: PHYSICAL MEDICINE & REHABILITATION

## 2023-06-05 PROCEDURE — 97530 THERAPEUTIC ACTIVITIES: CPT

## 2023-06-05 PROCEDURE — 99232 SBSQ HOSP IP/OBS MODERATE 35: CPT | Performed by: PHYSICAL MEDICINE & REHABILITATION

## 2023-06-05 PROCEDURE — 36415 COLL VENOUS BLD VENIPUNCTURE: CPT

## 2023-06-05 PROCEDURE — 97116 GAIT TRAINING THERAPY: CPT

## 2023-06-05 PROCEDURE — A9270 NON-COVERED ITEM OR SERVICE: HCPCS | Performed by: PHYSICAL MEDICINE & REHABILITATION

## 2023-06-05 PROCEDURE — 74018 RADEX ABDOMEN 1 VIEW: CPT

## 2023-06-05 PROCEDURE — 700111 HCHG RX REV CODE 636 W/ 250 OVERRIDE (IP): Performed by: PHYSICAL MEDICINE & REHABILITATION

## 2023-06-05 RX ORDER — MORPHINE SULFATE 15 MG/1
15 TABLET, FILM COATED, EXTENDED RELEASE ORAL
Status: DISCONTINUED | OUTPATIENT
Start: 2023-06-05 | End: 2023-06-08 | Stop reason: HOSPADM

## 2023-06-05 RX ORDER — TESTOSTERONE GEL, 1% 10 MG/G
100 GEL TRANSDERMAL DAILY
Status: CANCELLED | OUTPATIENT
Start: 2023-06-05

## 2023-06-05 RX ADMIN — OXYCODONE 5 MG: 5 TABLET ORAL at 17:15

## 2023-06-05 RX ADMIN — DOCUSATE SODIUM 200 MG: 100 CAPSULE, LIQUID FILLED ORAL at 08:30

## 2023-06-05 RX ADMIN — OXYCODONE HYDROCHLORIDE 10 MG: 10 TABLET ORAL at 11:49

## 2023-06-05 RX ADMIN — TESTOSTERONE GEL, 1% 100 MG: 10 GEL TRANSDERMAL at 10:08

## 2023-06-05 RX ADMIN — DULOXETINE HYDROCHLORIDE 60 MG: 30 CAPSULE, DELAYED RELEASE ORAL at 08:30

## 2023-06-05 RX ADMIN — TIZANIDINE 4 MG: 4 TABLET ORAL at 14:30

## 2023-06-05 RX ADMIN — TIZANIDINE 4 MG: 4 TABLET ORAL at 19:54

## 2023-06-05 RX ADMIN — SENNOSIDES 25.8 MG: 8.6 TABLET, FILM COATED ORAL at 19:53

## 2023-06-05 RX ADMIN — GABAPENTIN 900 MG: 300 CAPSULE ORAL at 19:53

## 2023-06-05 RX ADMIN — LIDOCAINE 3 PATCH: 50 PATCH CUTANEOUS at 08:30

## 2023-06-05 RX ADMIN — MULTIPLE VITAMINS W/ MINERALS TAB 1 TABLET: TAB at 11:49

## 2023-06-05 RX ADMIN — TIZANIDINE 4 MG: 4 TABLET ORAL at 08:30

## 2023-06-05 RX ADMIN — GABAPENTIN 900 MG: 300 CAPSULE ORAL at 14:29

## 2023-06-05 RX ADMIN — Medication 2000 UNITS: at 08:30

## 2023-06-05 RX ADMIN — DOCUSATE SODIUM 200 MG: 100 CAPSULE, LIQUID FILLED ORAL at 19:53

## 2023-06-05 RX ADMIN — OXYCODONE HYDROCHLORIDE 10 MG: 10 TABLET ORAL at 08:30

## 2023-06-05 RX ADMIN — BISACODYL 10 MG: 10 SUPPOSITORY RECTAL at 05:25

## 2023-06-05 RX ADMIN — GABAPENTIN 900 MG: 300 CAPSULE ORAL at 08:30

## 2023-06-05 RX ADMIN — ENOXAPARIN SODIUM 40 MG: 100 INJECTION SUBCUTANEOUS at 19:53

## 2023-06-05 RX ADMIN — OMEPRAZOLE 20 MG: 20 CAPSULE, DELAYED RELEASE ORAL at 08:30

## 2023-06-05 RX ADMIN — ATORVASTATIN CALCIUM 20 MG: 10 TABLET, FILM COATED ORAL at 19:53

## 2023-06-05 RX ADMIN — MORPHINE SULFATE 15 MG: 15 TABLET, FILM COATED, EXTENDED RELEASE ORAL at 20:08

## 2023-06-05 RX ADMIN — OXYCODONE HYDROCHLORIDE 10 MG: 10 TABLET ORAL at 04:32

## 2023-06-05 ASSESSMENT — ACTIVITIES OF DAILY LIVING (ADL)
BED_CHAIR_WHEELCHAIR_TRANSFER_DESCRIPTION: SUPERVISION FOR SAFETY
TOILET_TRANSFER_DESCRIPTION: ADAPTIVE EQUIPMENT;GRAB BAR;INCREASED TIME;SET-UP OF EQUIPMENT;SUPERVISION FOR SAFETY;VERBAL CUEING
BED_CHAIR_WHEELCHAIR_TRANSFER_DESCRIPTION: ADAPTIVE EQUIPMENT;INCREASED TIME;SET-UP OF EQUIPMENT;SUPERVISION FOR SAFETY;VERBAL CUEING

## 2023-06-05 ASSESSMENT — GAIT ASSESSMENTS
DEVIATION: ATAXIC;BRADYKINETIC;DECREASED HEEL STRIKE;DECREASED TOE OFF;OTHER (COMMENT)
GAIT LEVEL OF ASSIST: MINIMAL ASSIST
ASSISTIVE DEVICE: FRONT WHEEL WALKER
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
## 2023-06-05 NOTE — THERAPY
Occupational Therapy  Daily Treatment     Patient Name: Bharath Diaz  Age:  42 y.o., Sex:  male  Medical Record #: [id number]  Today's Date: 6/5/2023     Precautions  Precautions: (P) Fall Risk, TLSO (Thoracolumbosacral orthosis)  Comments: (P) possible R partial infraspinatus tear; R rib 1,8,9 fxs; T11-L1 posterior fusion; L3 AIS B    Subjective    Patient in bed upon arrival, agreeable to participate in OT.      Objective     06/05/23 1431   OT Charge Group   OT Self Care / ADL (Units) 2   OT Total Time Spent   OT Individual Total Time Spent (Mins) 30   Precautions   Precautions Fall Risk;TLSO (Thoracolumbosacral orthosis)   Comments possible R partial infraspinatus tear; R rib 1,8,9 fxs; T11-L1 posterior fusion; L3 AIS B   Vitals   O2 Delivery Device None - Room Air   Functional Level of Assist   Lower Body Dressing Standby Assist  (Set-up and increased time to don B socks, AFOs and lace shoes in bed)   Bed, Chair, Wheelchair Transfer Standby Assist  (bed > w/c, therapist stabilized w/c)   Interdisciplinary Plan of Care Collaboration   Patient Position at End of Therapy Seated;Call Light within Reach     Patient donned TLSO EOB @ supervision level    Reviewed adaptive strategies for LB dressing; patient verbalized understanding and demo'd ability to successfully don B AFOs, socks and lace shoes @ bed level (w/ HOB elevated).     Reviewed importance of completing regular pressure reliefs to maximize skin integrity; patient verbalized understanding and demo'd ability to complete via scapular depression and lateral weightshifting techniques in w/c.     Assessment    Patient tolerated OT session well w/ focus on progression with ADLs and education pressure relief management. Patient demo's improved functional independence w/ LB dressing when completing task @ bed level (w/ use of figure 4 technique and donning AFOs only first vs lace shoes w/ AFOs inside). Patient eager and motivated throughout session.    Strengths: Able to follow instructions, Alert and oriented, Effective communication skills, Good carryover of learning, Good insight into deficits/needs, Independent prior level of function, Making steady progress towards goals, Manages pain appropriately, Motivated for self care and independence, Pleasant and cooperative, Supportive family, Willingly participates in therapeutic activities  Barriers: Bladder incontinence, Bladder retention, Decreased endurance, Fatigue, Generalized weakness, Impaired activity tolerance, Impaired balance, Limited mobility, Pain    Plan    AE training for LB, sitting balance/functional transfers, BLE NMRE for sensation/strengthening, BUE strengthening without RUE ext rot, begin acquiring pictures and measurements of home setup for DME recommendations.     Passport items to be completed:  Perform bathroom transfers, complete dressing, complete feeding, get ready for the day, prepare a simple meal, participate in household tasks, adapt home for safety needs, demonstrate home exercise program, complete caregiver training     Occupational Therapy Goals (Active)       Problem: Bathing       Dates: Start:  06/01/23         Goal: STG-Within one week, patient will bathe at CGA level using DME/AE PRN.       Dates: Start:  06/01/23               Problem: Dressing       Dates: Start:  06/01/23         Goal: STG-Within one week, patient will dress UB with clothing retrieval at Mod I level, including TLSO don/doff.       Dates: Start:  06/01/23               Problem: Functional Transfers       Dates: Start:  06/01/23         Goal: STG-Within one week, patient will transfer to toilet at CGA x1 using DME/AE PRN.       Dates: Start:  06/01/23            Goal: STG-Within one week, patient will transfer to tub/shower using tub transfer bench at CGA level using DME PRN.       Dates: Start:  06/01/23               Problem: IADL's       Dates: Start:  06/01/23         Goal: STG-Within one week, patient  will access kitchen area at w/c level with SPV using AE PRN.       Dates: Start:  06/01/23               Problem: OT Long Term Goals       Dates: Start:  05/24/23         Goal: LTG-By discharge, patient will complete basic self care tasks at Min A x1-Mod I level using DME/AE PRN.       Dates: Start:  05/24/23    Expected End:  06/08/23            Goal: LTG-By discharge, patient will perform bathroom transfers at Min A x1-Mod I level using DME/AE PRN.       Dates: Start:  05/24/23    Expected End:  06/08/23            Goal: LTG-By discharge, patient will complete basic home management at Min A x1-Mod I level using DME/AE PRN.       Dates: Start:  05/24/23    Expected End:  06/08/23               Problem: Toileting       Dates: Start:  06/01/23         Goal: STG-Within one week, patient will complete toileting tasks at Max A x1 using DME/AE PRN.       Dates: Start:  06/01/23

## 2023-06-05 NOTE — THERAPY
"Physical Therapy   Daily Treatment     Patient Name: Bharath Diaz  Age:  42 y.o., Sex:  male  Medical Record #: [id number]  Today's Date: 6/5/2023     Precautions  Precautions: Fall Risk, TLSO (Thoracolumbosacral orthosis)  Comments: possible R partial infraspinatus tear; R rib 1,8,9 fxs; T11-L1 posterior fusion; L3 AIS B    Subjective    Patient in bed and agreeable to therapy, requesting to perform floor transfer.     Objective       06/05/23 1301   PT Charge Group   PT Neuromuscular Re-Education / Balance (Units) 1   PT Therapeutic Activities (Units) 1   PT Total Time Spent   PT Individual Total Time Spent (Mins) 30   Precautions   Precautions Fall Risk;TLSO (Thoracolumbosacral orthosis)   Comments possible R partial infraspinatus tear; R rib 1,8,9 fxs; T11-L1 posterior fusion; L3 AIS B   Wheelchair Functional Level of Assist   Wheelchair Assist Supervised   Distance Wheelchair (Feet or Distance) 200   Wheelchair Description Supervision for safety;Verbal cueing  (cues for turning technique to conserve momentum)   Transfer Functional Level of Assist   Bed, Chair, Wheelchair Transfer Standby Assist   Bed Chair Wheelchair Transfer Description Supervision for safety  (lateral scoot with assist to stabilize w/c)   Bed Mobility    Supine to Sit Standby Assist   Sit to Supine Standby Assist   Scooting Supervised   Rolling Supervised   Interdisciplinary Plan of Care Collaboration   IDT Collaboration with  Therapy Tech;Other (See Comments)  (x-ray tech)   Patient Position at End of Therapy In Bed;Call Light within Reach;Tray Table within Reach;Phone within Reach   Collaboration Comments therapy tech assisting with session; x-ray tech for logistics of xray     Floor transfer using bumping technique from therapy mat>7\" step>floor with SBA-CGA. Able to move from [place] sitting to quadruped with SBA-CGA, then to tall kneeling with BUE support on therapy mat with SBA. Return to sitting using half tall kneeling with LLE " leading, Min A to plant R toes, then pushing up to rock torso forward onto mat and rolling into supine position. One LOB when in tall kneeling leading with RLE requiring CGA-Min A to control descent, Min A to return to tall kneeling.   - Static tall kneeling with no UE support 3x10-20 seconds, tendency to hang on Y-ligaments   - Modified child's pose with BUEs on therapy mat 1x30 seconds, as well as with slight lateral flexion bias   - Half tall kneeling with assist to place lead foot, CGA-SBA with intermittent UE support   - Hamstring activation Nordics, cues for eccentric control 1x5    Assessment    Patient tolerated session well, able to palpate trace hamstring activation with posterior chain exercises in tall kneeling. Demonstrating good technique for floor recovery, will continue to benefit from additional practice to improve ease and mechanics.    Strengths: Able to follow instructions, Good carryover of learning, Good insight into deficits/needs, Independent prior level of function, Making steady progress towards goals, Motivated for self care and independence, Pleasant and cooperative, Supportive family, Willingly participates in therapeutic activities  Barriers: Bladder retention, Bowel incontinence, Impaired activity tolerance, Impaired balance, Pain, Limited mobility (paraplegia)    Plan    Bed mobility- per MD ok to go sit>longsit>supine  Stand step transfers FWW with second person assist, squat pivot if 1 person  Gait FWW with WC follow- waiting until family arrives Tuesday for footwear to trial custom AFOs  WC mobility indoors and outdoors  Trial  LE to address timing and coordination with stepping  Continue mat program for core and LE ROM and strengthening     Passport items to be completed:  Get in/out of bed safely, in/out of a vehicle, safely use mobility device, walk or wheel around home/community, navigate up and down stairs, show how to get up/down from the ground, ensure home is  accessible, demonstrate HEP, complete caregiver training    Physical Therapy Problems (Active)       Problem: Balance       Dates: Start:  05/24/23         Goal: STG-Within one week, patient will maintain static standing balance with B UE support and Ambar for 5 minutes to improve endurance and standing tolerance       Dates: Start:  05/24/23    Expected End:  06/08/23         Goal Note filed on 06/01/23 0814 by Mikey Villeda, PT       3 min                 Problem: PT-Long Term Goals       Dates: Start:  05/24/23         Goal: LTG-By discharge, patient will tolerate standing for 10 min and intermittent UE support with SBA in order to facilitate performance of ADLs       Dates: Start:  05/24/23    Expected End:  06/08/23            Goal: LTG-By discharge, patient will propel wheelchair 150ft Bossman       Dates: Start:  05/24/23    Expected End:  06/08/23            Goal: LTG-By discharge, patient will ambulate 50ft Ambar using LRAD       Dates: Start:  05/24/23    Expected End:  06/08/23            Goal: LTG-By discharge, patient will transfer one surface to another with CGA       Dates: Start:  05/24/23    Expected End:  06/08/23            Goal: LTG-By discharge, patient will perform home exercise program independently       Dates: Start:  05/24/23    Expected End:  06/08/23            Goal: LTG-By discharge, patient will transfer in/out of a car with modA       Dates: Start:  05/24/23    Expected End:  06/08/23            Goal: LTG-By discharge, patient will perform bed mobility independently       Dates: Start:  05/24/23    Expected End:  06/08/23

## 2023-06-05 NOTE — THERAPY
Physical Therapy   Daily Treatment     Patient Name: Bharath Diaz  Age:  42 y.o., Sex:  male  Medical Record #: [id number]  Today's Date: 6/5/2023     Precautions  Precautions: Fall Risk, TLSO (Thoracolumbosacral orthosis)  Comments: possible R partial infraspinatus tear; R rib 1,8,9 fxs; T11-L1 posterior fusion; L3 AIS B    Subjective    Patient received sitting EOB with RN present for meds admin; agreeable to PT; reports would like to trial new B AFOs this session; reports would like to be able to ambulate within home after d/c.     Objective       06/05/23 0831   PT Charge Group   PT Gait Training (Units) 3   PT Therapeutic Activities (Units) 1   PT Total Time Spent   PT Individual Total Time Spent (Mins) 60   Vitals   O2 (LPM) 0   O2 Delivery Device None - Room Air   Gait Functional Level of Assist    Gait Level Of Assist Minimal Assist  (Min A to CGA.  Second person for w/c follow.)   Assistive Device Front Wheel Walker  (and TLSO.  Also, first time with newly donned B AFOs.)   Distance (Feet)   (32 ft, 35 ft, 34 ft, 4th rep interrupted after 2 ft due to small bowel accident, then 42 ft, and 45 ft)   # of Times Distance was Traveled   (per above)   Deviation Ataxic;Bradykinetic;Decreased Heel Strike;Decreased Toe Off;Other (Comment)  (first 3 reps each with 4 turns during performance.  Uses compensatory B hip hike and circumduction for LE advancement, decreased B knee flexion, BUE support on FWW for compensations to trunk control and LE strength.  No LOB.)   Transfer Functional Level of Assist   Bed, Chair, Wheelchair Transfer Contact Guard Assist  (second person present for safety)   Bed Chair Wheelchair Transfer Description Adaptive equipment;Increased time;Set-up of equipment;Supervision for safety;Verbal cueing  (quality of movement improves with FWW used during second half)   Toilet Transfers Minimal Assist  (second person present for safety)   Toilet Transfer Description Adaptive equipment;Grab  bar;Increased time;Set-up of equipment;Supervision for safety;Verbal cueing  (w/c level)   Bed Mobility    Supine to Sit Standby Assist   Sit to Stand Minimal Assist  (CGA to Min A.  FWW for 6 reps.  Grab bar for 2 reps.)   Scooting Standby Assist   Rolling Supervised   Interdisciplinary Plan of Care Collaboration   IDT Collaboration with  Therapy Tech;Certified Nursing Assistant   Patient Position at End of Therapy Edge of Bed;Call Light within Reach;Tray Table within Reach;Phone within Reach   Collaboration Comments Tech A throughout.  CNA re small BM and small bowel accident.         Assessment    Patient is highly motivated, has good safety awareness, limited core and BLE control noted but slowly improving endurance and activity tolerance, one small bowel accident as per above, will benefit from core and LE strengthening HEP, one small redness area noted from new pair of shoes after AFO usage x50 minutes but resolves within 5 minutes, pleasant.    Strengths: Able to follow instructions, Good carryover of learning, Good insight into deficits/needs, Independent prior level of function, Making steady progress towards goals, Motivated for self care and independence, Pleasant and cooperative, Supportive family, Willingly participates in therapeutic activities  Barriers: Bladder retention, Bowel incontinence, Impaired activity tolerance, Impaired balance, Pain, Limited mobility (paraplegia)    Plan    Bed mobility- per MD ok to go sit>longsit>supine  Stand step transfers FWW with second person assist, squat pivot if 1 person  Gait FWW with WC follow- waiting until family arrives Tuesday for footwear to trial custom AFOs  WC mobility indoors and outdoors  Trial  LE to address timing and coordination with stepping  Continue mat program for core and LE ROM and strengthening     Passport items to be completed:  Get in/out of bed safely, in/out of a vehicle, safely use/maintain mobility device, navigate around  home/community, show how to get up/down from the ground, demonstrate pressure relief/ perform skin check, ensure home is accessible, demonstrate HEP, complete caregiver training    Physical Therapy Problems (Active)       Problem: Balance       Dates: Start:  05/24/23         Goal: STG-Within one week, patient will maintain static standing balance with B UE support and Ambar for 5 minutes to improve endurance and standing tolerance       Dates: Start:  05/24/23    Expected End:  06/08/23         Goal Note filed on 06/01/23 0814 by Mikey Villeda, PT       3 min                 Problem: PT-Long Term Goals       Dates: Start:  05/24/23         Goal: LTG-By discharge, patient will tolerate standing for 10 min and intermittent UE support with SBA in order to facilitate performance of ADLs       Dates: Start:  05/24/23    Expected End:  06/08/23            Goal: LTG-By discharge, patient will propel wheelchair 150ft Bossman       Dates: Start:  05/24/23    Expected End:  06/08/23            Goal: LTG-By discharge, patient will ambulate 50ft Ambar using LRAD       Dates: Start:  05/24/23    Expected End:  06/08/23            Goal: LTG-By discharge, patient will transfer one surface to another with CGA       Dates: Start:  05/24/23    Expected End:  06/08/23            Goal: LTG-By discharge, patient will perform home exercise program independently       Dates: Start:  05/24/23    Expected End:  06/08/23            Goal: LTG-By discharge, patient will transfer in/out of a car with modA       Dates: Start:  05/24/23    Expected End:  06/08/23            Goal: LTG-By discharge, patient will perform bed mobility independently       Dates: Start:  05/24/23    Expected End:  06/08/23

## 2023-06-06 ENCOUNTER — APPOINTMENT (OUTPATIENT)
Dept: OCCUPATIONAL THERAPY | Facility: REHABILITATION | Age: 43
DRG: 949 | End: 2023-06-06
Attending: PHYSICAL MEDICINE & REHABILITATION
Payer: COMMERCIAL

## 2023-06-06 ENCOUNTER — APPOINTMENT (OUTPATIENT)
Dept: PHYSICAL THERAPY | Facility: REHABILITATION | Age: 43
DRG: 949 | End: 2023-06-06
Attending: PHYSICAL MEDICINE & REHABILITATION
Payer: COMMERCIAL

## 2023-06-06 LAB
ANION GAP SERPL CALC-SCNC: 10 MMOL/L (ref 7–16)
BUN SERPL-MCNC: 12 MG/DL (ref 8–22)
CALCIUM SERPL-MCNC: 8.8 MG/DL (ref 8.5–10.5)
CHLORIDE SERPL-SCNC: 103 MMOL/L (ref 96–112)
CO2 SERPL-SCNC: 26 MMOL/L (ref 20–33)
CREAT SERPL-MCNC: 0.86 MG/DL (ref 0.5–1.4)
GFR SERPLBLD CREATININE-BSD FMLA CKD-EPI: 110 ML/MIN/1.73 M 2
GLUCOSE SERPL-MCNC: 103 MG/DL (ref 65–99)
POTASSIUM SERPL-SCNC: 4.3 MMOL/L (ref 3.6–5.5)
SODIUM SERPL-SCNC: 139 MMOL/L (ref 135–145)
TESTOST SERPL-MCNC: 432 NG/DL (ref 175–781)

## 2023-06-06 PROCEDURE — 770010 HCHG ROOM/CARE - REHAB SEMI PRIVAT*

## 2023-06-06 PROCEDURE — 84403 ASSAY OF TOTAL TESTOSTERONE: CPT

## 2023-06-06 PROCEDURE — 700111 HCHG RX REV CODE 636 W/ 250 OVERRIDE (IP): Performed by: PHYSICAL MEDICINE & REHABILITATION

## 2023-06-06 PROCEDURE — 700102 HCHG RX REV CODE 250 W/ 637 OVERRIDE(OP): Performed by: PHYSICAL MEDICINE & REHABILITATION

## 2023-06-06 PROCEDURE — 97116 GAIT TRAINING THERAPY: CPT

## 2023-06-06 PROCEDURE — 80048 BASIC METABOLIC PNL TOTAL CA: CPT

## 2023-06-06 PROCEDURE — 700101 HCHG RX REV CODE 250: Performed by: PHYSICAL MEDICINE & REHABILITATION

## 2023-06-06 PROCEDURE — 97535 SELF CARE MNGMENT TRAINING: CPT

## 2023-06-06 PROCEDURE — 36415 COLL VENOUS BLD VENIPUNCTURE: CPT

## 2023-06-06 PROCEDURE — 99232 SBSQ HOSP IP/OBS MODERATE 35: CPT | Performed by: PHYSICAL MEDICINE & REHABILITATION

## 2023-06-06 PROCEDURE — 97530 THERAPEUTIC ACTIVITIES: CPT

## 2023-06-06 PROCEDURE — A9270 NON-COVERED ITEM OR SERVICE: HCPCS | Performed by: PHYSICAL MEDICINE & REHABILITATION

## 2023-06-06 RX ORDER — OXYCODONE HYDROCHLORIDE 5 MG/1
2.5 TABLET ORAL
Status: DISCONTINUED | OUTPATIENT
Start: 2023-06-06 | End: 2023-06-08 | Stop reason: HOSPADM

## 2023-06-06 RX ORDER — OXYCODONE HYDROCHLORIDE 5 MG/1
5 TABLET ORAL
Status: DISCONTINUED | OUTPATIENT
Start: 2023-06-06 | End: 2023-06-08 | Stop reason: HOSPADM

## 2023-06-06 RX ADMIN — GABAPENTIN 900 MG: 300 CAPSULE ORAL at 08:27

## 2023-06-06 RX ADMIN — ATORVASTATIN CALCIUM 20 MG: 10 TABLET, FILM COATED ORAL at 19:22

## 2023-06-06 RX ADMIN — OXYCODONE HYDROCHLORIDE 10 MG: 10 TABLET ORAL at 07:24

## 2023-06-06 RX ADMIN — TIZANIDINE 4 MG: 4 TABLET ORAL at 15:22

## 2023-06-06 RX ADMIN — OXYCODONE 5 MG: 5 TABLET ORAL at 15:23

## 2023-06-06 RX ADMIN — TESTOSTERONE GEL, 1% 100 MG: 10 GEL TRANSDERMAL at 08:29

## 2023-06-06 RX ADMIN — LIDOCAINE 3 PATCH: 50 PATCH CUTANEOUS at 08:27

## 2023-06-06 RX ADMIN — DOCUSATE SODIUM 200 MG: 100 CAPSULE, LIQUID FILLED ORAL at 19:22

## 2023-06-06 RX ADMIN — ENOXAPARIN SODIUM 40 MG: 100 INJECTION SUBCUTANEOUS at 19:22

## 2023-06-06 RX ADMIN — MULTIPLE VITAMINS W/ MINERALS TAB 1 TABLET: TAB at 11:20

## 2023-06-06 RX ADMIN — Medication 2000 UNITS: at 08:28

## 2023-06-06 RX ADMIN — GABAPENTIN 900 MG: 300 CAPSULE ORAL at 15:21

## 2023-06-06 RX ADMIN — GABAPENTIN 900 MG: 300 CAPSULE ORAL at 19:22

## 2023-06-06 RX ADMIN — DULOXETINE HYDROCHLORIDE 60 MG: 30 CAPSULE, DELAYED RELEASE ORAL at 08:27

## 2023-06-06 RX ADMIN — OMEPRAZOLE 20 MG: 20 CAPSULE, DELAYED RELEASE ORAL at 08:29

## 2023-06-06 RX ADMIN — TIZANIDINE 4 MG: 4 TABLET ORAL at 08:28

## 2023-06-06 RX ADMIN — SENNOSIDES 25.8 MG: 8.6 TABLET, FILM COATED ORAL at 19:26

## 2023-06-06 RX ADMIN — TIZANIDINE 4 MG: 4 TABLET ORAL at 19:22

## 2023-06-06 RX ADMIN — MORPHINE SULFATE 15 MG: 15 TABLET, FILM COATED, EXTENDED RELEASE ORAL at 19:22

## 2023-06-06 RX ADMIN — DOCUSATE SODIUM 200 MG: 100 CAPSULE, LIQUID FILLED ORAL at 08:28

## 2023-06-06 RX ADMIN — OXYCODONE 5 MG: 5 TABLET ORAL at 18:21

## 2023-06-06 ASSESSMENT — ACTIVITIES OF DAILY LIVING (ADL)
BED_CHAIR_WHEELCHAIR_TRANSFER_DESCRIPTION: ADAPTIVE EQUIPMENT;SQUAT PIVOT TRANSFER TO WHEELCHAIR;SUPERVISION FOR SAFETY;VERBAL CUEING
TUB_SHOWER_TRANSFER_DESCRIPTION: SHOWER BENCH;INCREASED TIME;SET-UP OF EQUIPMENT;SUPERVISION FOR SAFETY
BED_CHAIR_WHEELCHAIR_TRANSFER_DESCRIPTION: ADAPTIVE EQUIPMENT;SQUAT PIVOT TRANSFER TO WHEELCHAIR;SUPERVISION FOR SAFETY;VERBAL CUEING
TOILET_TRANSFER_DESCRIPTION: ADAPTIVE EQUIPMENT;GRAB BAR;INCREASED TIME;SET-UP OF EQUIPMENT;SUPERVISION FOR SAFETY;VERBAL CUEING

## 2023-06-06 ASSESSMENT — PAIN DESCRIPTION - PAIN TYPE
TYPE: ACUTE PAIN
TYPE: ACUTE PAIN

## 2023-06-06 ASSESSMENT — GAIT ASSESSMENTS
ASSISTIVE DEVICE: FRONT WHEEL WALKER
DEVIATION: ATAXIC;BRADYKINETIC;DECREASED HEEL STRIKE;DECREASED TOE OFF
DISTANCE (FEET): 40
GAIT LEVEL OF ASSIST: MINIMAL ASSIST

## 2023-06-06 NOTE — DISCHARGE PLANNING
Cm  Attempted to complete concurrent review; no fax no to sent review in chart; tc to PAR - they advised me initial auth was obtained through [employer]; concurrent review completed in [employer].     Met with briefly w/ pt along w/ his mom and sister; they are eager for dc on Thursday.

## 2023-06-06 NOTE — DISCHARGE PLANNING
CM.   Concurrent review done; requested additional days via [facility]; pending authorization.     Met w/ pt along w/ his mom and sister who are here for  family training.  They wll be in [place] until 6/8/2023 and then return to [place].

## 2023-06-06 NOTE — THERAPY
Physical Therapy   Daily Treatment     Patient Name: Bharath Diaz  Age:  42 y.o., Sex:  male  Medical Record #: [id number]  Today's Date: 6/6/2023     Precautions  Precautions: Fall Risk, TLSO (Thoracolumbosacral orthosis), Spinal / Back Precautions   Comments: possible R partial infraspinatus tear; R rib 1,8,9 fxs; T11-L1 posterior fusion; L3 AIS B    Subjective    Pt was supine in bed upon arrival and agreeable to treatment.  Pt's mother and sister were present for family training.      Objective       06/06/23 1401   PT Charge Group   PT Gait Training (Units) 1   PT Therapeutic Activities (Units) 5   PT Total Time Spent   PT Individual Total Time Spent (Mins) 90   Precautions   Precautions Fall Risk;TLSO (Thoracolumbosacral orthosis);Spinal / Back Precautions    Gait Functional Level of Assist    Gait Level Of Assist Minimal Assist   Assistive Device Front Wheel Walker   Distance (Feet) 40  (x30 feet)   # of Times Distance was Traveled 1   Deviation Ataxic;Bradykinetic;Decreased Heel Strike;Decreased Toe Off  (R LE circumduction, crouched stance)   Wheelchair Functional Level of Assist   Wheelchair Assist Modified Independent   Distance Wheelchair (Feet or Distance) 200   Wheelchair Description Adaptive equipment   Transfer Functional Level of Assist   Bed, Chair, Wheelchair Transfer Standby Assist   Bed Chair Wheelchair Transfer Description Adaptive equipment;Squat pivot transfer to wheelchair;Supervision for safety;Verbal cueing   Bed Mobility    Supine to Sit Supervised   Rolling Supervised   Interdisciplinary Plan of Care Collaboration   IDT Collaboration with  Occupational Therapist   Patient Position at End of Therapy Seated;Call Light within Reach;Tray Table within Reach;Phone within Reach;Family / Friend in Room   Collaboration Comments CLOF, family training     Completed family training on the following items: bed transfers, donning/doffing B socks, shoes, and AFO, AFO wearing schedule, W/C and W/C  components, bumping W/C up step, getting W/C into/out of car, car transfers x 2 reps with min A-CGA.  Additional AMB completed with focus on mechanics and compensation vs restoration with gait mechanics. Continued discussion on D/C planning.  Gathered elastic shoelaces and completed collaboration with [employer] vendor on loaner delivery.     Assessment    Pt continues to make steady progress in functional mobility.  Pt's family demonstrated and verbalized all education with good guarding and body mechanics.    Strengths: Able to follow instructions, Good carryover of learning, Good insight into deficits/needs, Independent prior level of function, Making steady progress towards goals, Motivated for self care and independence, Pleasant and cooperative, Supportive family, Willingly participates in therapeutic activities  Barriers: Bladder retention, Bowel incontinence, Impaired activity tolerance, Impaired balance, Pain, Limited mobility (paraplegia)    Plan    Continue gait training with FWW and B AFOs, standing tolerance and posture, w/c mobility, D/C [facility] [place] in preparation for D/C 6/8/23.  Follow up with vendor on w/[employer].      Passport items to be completed:  Get in/out of bed safely, in/out of a vehicle, safely use mobility device, walk or wheel around home/community, navigate up and down stairs, show how to get up/down from the ground, ensure home is accessible, demonstrate HEP, complete caregiver training    Physical Therapy Problems (Active)       Problem: Balance       Dates: Start:  05/24/23         Goal: STG-Within one week, patient will maintain static standing balance with B UE support and Ambar for 5 minutes to improve endurance and standing tolerance       Dates: Start:  05/24/23    Expected End:  06/08/23         Goal Note filed on 06/01/23 0814 by Mikey Villeda, PT       3 min                 Problem: PT-Long Term Goals       Dates: Start:  05/24/23         Goal: LTG-By discharge, patient will tolerate  standing for 10 min and intermittent UE support with SBA in order to facilitate performance of ADLs       Dates: Start:  05/24/23    Expected End:  06/08/23            Goal: LTG-By discharge, patient will propel wheelchair 150ft Bossman       Dates: Start:  05/24/23    Expected End:  06/08/23            Goal: LTG-By discharge, patient will ambulate 50ft Ambar using LRAD       Dates: Start:  05/24/23    Expected End:  06/08/23            Goal: LTG-By discharge, patient will transfer one surface to another with CGA       Dates: Start:  05/24/23    Expected End:  06/08/23            Goal: LTG-By discharge, patient will perform home exercise program independently       Dates: Start:  05/24/23    Expected End:  06/08/23            Goal: LTG-By discharge, patient will transfer in/out of a car with modA       Dates: Start:  05/24/23    Expected End:  06/08/23            Goal: LTG-By discharge, patient will perform bed mobility independently       Dates: Start:  05/24/23    Expected End:  06/08/23
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
## 2023-06-07 ENCOUNTER — APPOINTMENT (OUTPATIENT)
Dept: OCCUPATIONAL THERAPY | Facility: REHABILITATION | Age: 43
End: 2023-06-07
Attending: PHYSICAL MEDICINE & REHABILITATION
Payer: COMMERCIAL

## 2023-06-07 ENCOUNTER — APPOINTMENT (OUTPATIENT)
Dept: PHYSICAL THERAPY | Facility: REHABILITATION | Age: 43
End: 2023-06-07
Attending: PHYSICAL MEDICINE & REHABILITATION
Payer: COMMERCIAL

## 2023-06-07 ENCOUNTER — APPOINTMENT (OUTPATIENT)
Dept: PHYSICAL THERAPY | Facility: REHABILITATION | Age: 43
DRG: 949 | End: 2023-06-07
Attending: PHYSICAL MEDICINE & REHABILITATION
Payer: COMMERCIAL

## 2023-06-07 PROBLEM — Z74.09 IMPAIRED MOBILITY AND ADLS: Status: ACTIVE | Noted: 2023-06-07

## 2023-06-07 PROBLEM — S27.322A BILATERAL PULMONARY CONTUSION: Status: RESOLVED | Noted: 2023-05-16 | Resolved: 2023-06-07

## 2023-06-07 PROBLEM — Z78.9 IMPAIRED MOBILITY AND ADLS: Status: ACTIVE | Noted: 2023-06-07

## 2023-06-07 PROBLEM — R25.2 SPASTICITY: Status: RESOLVED | Noted: 2023-05-23 | Resolved: 2023-06-07

## 2023-06-07 PROBLEM — G90.3 NEUROGENIC ORTHOSTATIC HYPOTENSION (HCC): Status: RESOLVED | Noted: 2023-05-23 | Resolved: 2023-06-07

## 2023-06-07 PROCEDURE — A9270 NON-COVERED ITEM OR SERVICE: HCPCS | Performed by: PHYSICAL MEDICINE & REHABILITATION

## 2023-06-07 PROCEDURE — 99232 SBSQ HOSP IP/OBS MODERATE 35: CPT | Performed by: PHYSICAL MEDICINE & REHABILITATION

## 2023-06-07 PROCEDURE — 700102 HCHG RX REV CODE 250 W/ 637 OVERRIDE(OP): Performed by: PHYSICAL MEDICINE & REHABILITATION

## 2023-06-07 PROCEDURE — 700111 HCHG RX REV CODE 636 W/ 250 OVERRIDE (IP): Performed by: PHYSICAL MEDICINE & REHABILITATION

## 2023-06-07 PROCEDURE — RXMED WILLOW AMBULATORY MEDICATION CHARGE: Performed by: PHYSICAL MEDICINE & REHABILITATION

## 2023-06-07 PROCEDURE — 97530 THERAPEUTIC ACTIVITIES: CPT

## 2023-06-07 PROCEDURE — 97110 THERAPEUTIC EXERCISES: CPT

## 2023-06-07 PROCEDURE — 700101 HCHG RX REV CODE 250: Performed by: PHYSICAL MEDICINE & REHABILITATION

## 2023-06-07 PROCEDURE — 770010 HCHG ROOM/CARE - REHAB SEMI PRIVAT*

## 2023-06-07 RX ORDER — ENOXAPARIN SODIUM 100 MG/ML
40 INJECTION SUBCUTANEOUS
Qty: 30 EACH | Refills: 0 | Status: SHIPPED | OUTPATIENT
Start: 2023-06-07 | End: 2023-07-08

## 2023-06-07 RX ORDER — GABAPENTIN 300 MG/1
900 CAPSULE ORAL 3 TIMES DAILY
Qty: 270 CAPSULE | Refills: 2 | Status: SHIPPED | OUTPATIENT
Start: 2023-06-07 | End: 2023-12-06

## 2023-06-07 RX ORDER — OXYCODONE HYDROCHLORIDE 5 MG/1
5 TABLET ORAL
Qty: 112 TABLET | Refills: 0 | Status: SHIPPED | OUTPATIENT
Start: 2023-06-07 | End: 2023-06-22

## 2023-06-07 RX ORDER — PSEUDOEPHEDRINE HCL 30 MG
200 TABLET ORAL 2 TIMES DAILY
Qty: 30 CAPSULE | Refills: 2 | Status: SHIPPED | OUTPATIENT
Start: 2023-06-07 | End: 2023-12-06

## 2023-06-07 RX ORDER — SENNOSIDES A AND B 8.6 MG/1
25.8 TABLET, FILM COATED ORAL
Qty: 180 TABLET | Refills: 2 | Status: SHIPPED | OUTPATIENT
Start: 2023-06-07 | End: 2023-12-06

## 2023-06-07 RX ORDER — ACETAMINOPHEN 500 MG
1000 TABLET ORAL 3 TIMES DAILY
Qty: 180 TABLET | Refills: 2 | Status: SHIPPED | OUTPATIENT
Start: 2023-06-07 | End: 2023-12-06

## 2023-06-07 RX ORDER — TIZANIDINE 4 MG/1
4 TABLET ORAL 3 TIMES DAILY
Qty: 90 TABLET | Refills: 2 | Status: SHIPPED | OUTPATIENT
Start: 2023-06-07 | End: 2023-12-06

## 2023-06-07 RX ORDER — DULOXETIN HYDROCHLORIDE 60 MG/1
60 CAPSULE, DELAYED RELEASE ORAL DAILY
Qty: 30 CAPSULE | Refills: 2 | Status: SHIPPED | OUTPATIENT
Start: 2023-06-08 | End: 2023-12-06

## 2023-06-07 RX ORDER — BISACODYL 10 MG/1
10 SUPPOSITORY RECTAL
Qty: 30 SUPPOSITORY | Refills: 2 | Status: SHIPPED | OUTPATIENT
Start: 2023-06-08 | End: 2023-12-06

## 2023-06-07 RX ORDER — ATORVASTATIN CALCIUM 20 MG/1
20 TABLET, FILM COATED ORAL EVERY EVENING
Qty: 30 TABLET | Refills: 2 | Status: SHIPPED | OUTPATIENT
Start: 2023-06-07 | End: 2023-12-06

## 2023-06-07 RX ORDER — SIMETHICONE 125 MG
125 TABLET,CHEWABLE ORAL 3 TIMES DAILY
Qty: 90 TABLET | Refills: 2 | Status: SHIPPED | OUTPATIENT
Start: 2023-06-07 | End: 2023-12-06

## 2023-06-07 RX ORDER — MORPHINE SULFATE 15 MG/1
15 TABLET, FILM COATED, EXTENDED RELEASE ORAL
Qty: 14 TABLET | Refills: 0 | Status: SHIPPED | OUTPATIENT
Start: 2023-06-07 | End: 2023-06-22

## 2023-06-07 RX ADMIN — OXYCODONE 5 MG: 5 TABLET ORAL at 18:09

## 2023-06-07 RX ADMIN — ENOXAPARIN SODIUM 40 MG: 100 INJECTION SUBCUTANEOUS at 19:33

## 2023-06-07 RX ADMIN — OXYCODONE 5 MG: 5 TABLET ORAL at 15:16

## 2023-06-07 RX ADMIN — ATORVASTATIN CALCIUM 20 MG: 10 TABLET, FILM COATED ORAL at 19:34

## 2023-06-07 RX ADMIN — DULOXETINE HYDROCHLORIDE 60 MG: 30 CAPSULE, DELAYED RELEASE ORAL at 08:08

## 2023-06-07 RX ADMIN — LIDOCAINE 3 PATCH: 50 PATCH CUTANEOUS at 08:09

## 2023-06-07 RX ADMIN — DOCUSATE SODIUM 200 MG: 100 CAPSULE, LIQUID FILLED ORAL at 19:33

## 2023-06-07 RX ADMIN — Medication 2000 UNITS: at 08:07

## 2023-06-07 RX ADMIN — GABAPENTIN 900 MG: 300 CAPSULE ORAL at 08:07

## 2023-06-07 RX ADMIN — GABAPENTIN 900 MG: 300 CAPSULE ORAL at 15:15

## 2023-06-07 RX ADMIN — OXYCODONE 5 MG: 5 TABLET ORAL at 08:08

## 2023-06-07 RX ADMIN — OXYCODONE 5 MG: 5 TABLET ORAL at 03:02

## 2023-06-07 RX ADMIN — BISACODYL 10 MG: 10 SUPPOSITORY RECTAL at 05:18

## 2023-06-07 RX ADMIN — DOCUSATE SODIUM 200 MG: 100 CAPSULE, LIQUID FILLED ORAL at 08:08

## 2023-06-07 RX ADMIN — GABAPENTIN 900 MG: 300 CAPSULE ORAL at 19:34

## 2023-06-07 RX ADMIN — MULTIPLE VITAMINS W/ MINERALS TAB 1 TABLET: TAB at 10:57

## 2023-06-07 RX ADMIN — TIZANIDINE 4 MG: 4 TABLET ORAL at 15:16

## 2023-06-07 RX ADMIN — TIZANIDINE 4 MG: 4 TABLET ORAL at 19:34

## 2023-06-07 RX ADMIN — MAGNESIUM HYDROXIDE 30 ML: 1200 LIQUID ORAL at 17:15

## 2023-06-07 RX ADMIN — SENNOSIDES 25.8 MG: 8.6 TABLET, FILM COATED ORAL at 19:33

## 2023-06-07 RX ADMIN — TIZANIDINE 4 MG: 4 TABLET ORAL at 08:08

## 2023-06-07 RX ADMIN — MORPHINE SULFATE 15 MG: 15 TABLET, FILM COATED, EXTENDED RELEASE ORAL at 19:34

## 2023-06-07 RX ADMIN — OMEPRAZOLE 20 MG: 20 CAPSULE, DELAYED RELEASE ORAL at 08:08

## 2023-06-07 RX ADMIN — TESTOSTERONE GEL, 1% 100 MG: 10 GEL TRANSDERMAL at 09:00

## 2023-06-07 ASSESSMENT — ACTIVITIES OF DAILY LIVING (ADL)
SHOWER_TRANSFER_LEVEL_OF_ASSIST: REQUIRES PHYSICAL ASSIST WITH SHOWER TRANSFER
BED_CHAIR_WHEELCHAIR_TRANSFER_DESCRIPTION: SET-UP OF EQUIPMENT;SUPERVISION FOR SAFETY;VERBAL CUEING
TOILETING_LEVEL_OF_ASSIST_DESCRIPTION: OTHER (COMMENT)
TOILETING_LEVEL_OF_ASSIST: REQUIRES PHYSICAL ASSIST WITH TOILETING
TUB_SHOWER_TRANSFER_DESCRIPTION: SHOWER BENCH;INCREASED TIME;SET-UP OF EQUIPMENT;SUPERVISION FOR SAFETY;VERBAL CUEING
TOILET_TRANSFER_DESCRIPTION: GRAB BAR;ADAPTIVE EQUIPMENT;INCREASED TIME;INITIAL PREPARATION FOR TASK;SET-UP OF EQUIPMENT;SUPERVISION FOR SAFETY;VERBAL CUEING
TOILET_TRANSFER_LEVEL_OF_ASSIST: REQUIRES PHYSICAL ASSIST WITH TOILET TRANSFER

## 2023-06-07 ASSESSMENT — BRIEF INTERVIEW FOR MENTAL STATUS (BIMS)
BIMS SUMMARY SCORE: 15
ASKED TO RECALL SOCK: YES, NO CUE REQUIRED
INITIAL REPETITION OF BED BLUE SOCK - FIRST ATTEMPT: 3
WHAT DAY OF THE WEEK IS IT: CORRECT
WHAT YEAR IS IT: CORRECT
ASKED TO RECALL BLUE: YES, NO CUE REQUIRED
WHAT MONTH IS IT: ACCURATE WITHIN 5 DAYS
ASKED TO RECALL BED: YES, NO CUE REQUIRED

## 2023-06-07 ASSESSMENT — GAIT ASSESSMENTS
DEVIATION: ATAXIC;BRADYKINETIC
GAIT LEVEL OF ASSIST: MINIMAL ASSIST
DISTANCE (FEET): 5
ASSISTIVE DEVICE: FRONT WHEEL WALKER

## 2023-06-07 ASSESSMENT — PAIN DESCRIPTION - PAIN TYPE
TYPE: ACUTE PAIN
TYPE: ACUTE PAIN

## 2023-06-07 NOTE — THERAPY
Recreational Therapy  Daily Treatment     Patient Name: Bharath Diaz  AGE:  42 y.o., SEX:  male  Medical Record #: [id number]  Today's Date: 6/7/2023       Subjective    Patient ready and agreeable for recreation therapy session.      Objective       06/07/23 1600   Procedural Tracking   Procedural Tracking Community Re-Integration;Community Skills Development;Leisure Skills Awareness;Leisure Skills Development;Social Skills Training;Cognitive Skills Training;Gross Motor Functional Leisure Skills;Group Treatment;Fine Motor Functional Leisure Skills   Treatment Time   Total Time Spent (mins) 15   Total Time Missed 15   Reasons for Time Missed Non-Medical-Other (Please Comment)   Functional Ability Status - Cognitive   Attention Span Remains on Task   Comprehension Follows One Step Commands;Follows Two Step Commands   Judgment Able to Make Independent Decisions   Functional Ability Status - Emotional    Affect Appropriate   Mood Appropriate   Behavior Appropriate;Cooperative   Skilled Intervention    Skilled Intervention Gross Motor Leisure;Fine Motor Leisure;Cognitive Leisure;Relaxation / Coping Skills;Leisure Education ;Community Skills   Skilled Intervention Comments [place] resources   Interdisciplinary Plan of Care Collaboration   Patient Position at End of Therapy Seated;Family / Friend in Room   Collaboration Comments mother and sister   Strengths & Barriers   Strengths Able to follow instructions;Adequate strength;Alert and oriented;Effective communication skills;Willingly participates in therapeutic activities;Supportive family;Pleasant and cooperative;Motivated for self care and independence;Manages pain appropriately;Making steady progress towards goals;Good insight into deficits/needs;Independent prior level of function;Good endurance;Good carryover of learning;Good balance   Barriers Generalized weakness;Limited mobility;Pain         Assessment    Patient and CTRS reviewed dc info and discussed [place]  "resources. Patient encouraged to reach out to next PT for more info. Patient also informed about [place] [employer]. Receptive to all education.     Strengths: (P) Able to follow instructions, Adequate strength, Alert and oriented, Effective communication skills, Willingly participates in therapeutic activities, Supportive family, Pleasant and cooperative, Motivated for self care and independence, Manages pain appropriately, Making steady progress towards goals, Good insight into deficits/needs, Independent prior level of function, Good endurance, Good carryover of learning, Good balance  Barriers: (P) Generalized weakness, Limited mobility, Pain    Plan    Patient will benefit from continued recreation therapy sessions.     Passport items to be completed:  Verbalize two positive leisure activities, participate in community outing, know how to use \"To Go Kit\", discuss returning to work, community groups or volunteer activities, explore community resources, schedule meeting with peer mentor   "
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
## 2023-06-07 NOTE — DISCHARGE PLANNING
CM  Met with pt along w/ his sister and mom.   Completed Short Term Disability and faxed back; original copy given to patient.   Pt has appt with RENATA Christianson with [facility] on 6/22/2023 @ 1000.   PT's sister was going to contact Dr Medina / [facility]  to confirm if he is able to see patient sooner than 6/22.   Sent referral for home health to [facility]   [facility] placard completed and faxed to [facility].   Referral sent to [facility] for catheter supplies - sampled provided also.   Information provided on [facility] also.     Wheelchair/cushion ordered thru [employer]; pt has rodolfo pryor pending build of his permanent chair; FWW ordered thru [employer]; pt not needing a slide board; family purchased a 3:1 commode, shower chair, and a suppository .     Family to provide transportation home.     PT has follow up appt w/ Dr Barger/ Neurosurgeon    Plan:  Dc to family's home in [facility]

## 2023-06-08 ENCOUNTER — PHARMACY VISIT (OUTPATIENT)
Dept: PHARMACY | Facility: MEDICAL CENTER | Age: 43
End: 2023-06-08
Payer: COMMERCIAL

## 2023-06-08 VITALS
DIASTOLIC BLOOD PRESSURE: 85 MMHG | HEIGHT: 68 IN | HEART RATE: 78 BPM | WEIGHT: 260.5 LBS | SYSTOLIC BLOOD PRESSURE: 131 MMHG | OXYGEN SATURATION: 97 % | BODY MASS INDEX: 39.48 KG/M2 | RESPIRATION RATE: 18 BRPM | TEMPERATURE: 98.5 F

## 2023-06-08 PROCEDURE — 99239 HOSP IP/OBS DSCHRG MGMT >30: CPT | Performed by: PHYSICAL MEDICINE & REHABILITATION

## 2023-06-08 PROCEDURE — 700101 HCHG RX REV CODE 250: Performed by: PHYSICAL MEDICINE & REHABILITATION

## 2023-06-08 PROCEDURE — A9270 NON-COVERED ITEM OR SERVICE: HCPCS | Performed by: PHYSICAL MEDICINE & REHABILITATION

## 2023-06-08 PROCEDURE — RXMED WILLOW AMBULATORY MEDICATION CHARGE: Performed by: PHYSICAL MEDICINE & REHABILITATION

## 2023-06-08 PROCEDURE — 700102 HCHG RX REV CODE 250 W/ 637 OVERRIDE(OP): Performed by: PHYSICAL MEDICINE & REHABILITATION

## 2023-06-08 RX ADMIN — TIZANIDINE 4 MG: 4 TABLET ORAL at 09:02

## 2023-06-08 RX ADMIN — GABAPENTIN 900 MG: 300 CAPSULE ORAL at 09:02

## 2023-06-08 RX ADMIN — OMEPRAZOLE 20 MG: 20 CAPSULE, DELAYED RELEASE ORAL at 09:01

## 2023-06-08 RX ADMIN — TESTOSTERONE GEL, 1% 100 MG: 10 GEL TRANSDERMAL at 09:16

## 2023-06-08 RX ADMIN — OXYCODONE 5 MG: 5 TABLET ORAL at 05:04

## 2023-06-08 RX ADMIN — Medication 2000 UNITS: at 09:02

## 2023-06-08 RX ADMIN — DOCUSATE SODIUM 200 MG: 100 CAPSULE, LIQUID FILLED ORAL at 09:02

## 2023-06-08 RX ADMIN — LIDOCAINE 3 PATCH: 50 PATCH CUTANEOUS at 09:00

## 2023-06-08 RX ADMIN — DULOXETINE HYDROCHLORIDE 60 MG: 30 CAPSULE, DELAYED RELEASE ORAL at 09:01

## 2023-06-08 RX ADMIN — OXYCODONE 5 MG: 5 TABLET ORAL at 12:40

## 2023-06-08 RX ADMIN — MULTIPLE VITAMINS W/ MINERALS TAB 1 TABLET: TAB at 11:51

## 2023-06-08 ASSESSMENT — PATIENT HEALTH QUESTIONNAIRE - PHQ9
2. FEELING DOWN, DEPRESSED, IRRITABLE, OR HOPELESS: NOT AT ALL
1. LITTLE INTEREST OR PLEASURE IN DOING THINGS: NOT AT ALL
SUM OF ALL RESPONSES TO PHQ9 QUESTIONS 1 AND 2: 0
1. LITTLE INTEREST OR PLEASURE IN DOING THINGS: NOT AT ALL
SUM OF ALL RESPONSES TO PHQ9 QUESTIONS 1 AND 2: 0
2. FEELING DOWN, DEPRESSED, IRRITABLE, OR HOPELESS: NOT AT ALL

## 2023-06-08 ASSESSMENT — PAIN DESCRIPTION - PAIN TYPE: TYPE: ACUTE PAIN

## 2023-06-08 NOTE — DISCHARGE INSTRUCTIONS
[facility] NURSING DISCHARGE INSTRUCTIONS    Blood Pressure: 127/83  Weight: 118 kg (260 lb 8 oz)  Nursing recommendations for Bharath Diaz at time of discharge are as follows:  Client and Family Member verbalized understanding of all discharge instructions and prescriptions.     Review all your home medications and newly ordered medications with your doctor and/or pharmacist. Follow medication instructions as directed by your doctor and/or pharmacist.    Pain Management:   Discharge Pain Medication Instructions:  Comfort Goal: Sleep Comfortably  Notify your primary care provider if pain is unrelieved with these measures, if the pain is new, or increased in intensity.    Discharge Skin Characteristics: Warm, Dry  Discharge Skin Exam: healing back incision     Skin / Wound Care Instructions: Please contact your primary care physician for any change in skin integrity.     If You Have Surgical Incisions / Wounds:  Monitor surgical site(s) for signs of increased swelling, redness or symptoms of drainage from the site or fever as this could indicate signs and symptoms of infection. If these symptoms are noted, notifiy your primary care provider.      Discharge Safety Instructions: Should Have ADULT SUPERVISION     Discharge Safety Concerns: Weakness, Balance Problems (Dizziness, Light Headedness)  The interdisciplinary team has made recommendation that you should have adult supervision in the house due to weakness and unsteady gait  Anti-embolic stockings are required during the day and off at night to increase circulation to the lower extremities.    Discharge Diet: Regular     Discharge Liquids: Thin  Discharge Bowel Function: Continent  Please contact your primary care physician for any changes in bowel habits.  Discharge Bowel Program:    Discharge Bladder Function: Continent  Discharge Urinary Devices: Other (Comments) (straight caths)      Nursing Discharge Plan:        Case Management  Discharge Instructions:   Discharge Location:    Agency Name/Address/Phone:    Home Health:    Outpatient Services:    DME Provider/Phone:    Medical Equipment Ordered:    Prescription Faxed to:        Discharge Medication Instructions:  Below are the medications your physician expects you to take upon discharge:  Spinal Cord Injury    The spinal cord is a long bundle of nerve cells and fibers along the spine. It carries messages between the brain and the rest of the body. A spinal cord injury can block the path of messages traveling through the spinal cord between your brain and the rest of your body. As a result, it can cause you to lose feeling, movement, and function in parts of the body below the injury.  There are two types of spinal cord injuries:  Incomplete. This type causes some loss of feeling, movement, or function below the level of the injury.  Complete. This type causes a total loss of feeling, movement, and function below the level of the injury.  What are the causes?  This condition may be caused by:  A car or motorcycle accident.  A fall.  A sports injury, such as from:  A trampoline accident.  Diving into shallow water or water that contains debris or obstacles.  A gunshot.  A birth injury.  A surgical injury.  An infection that involves the spinal cord.  This condition is most often caused by an injury to the bones that make up the spine. Any movement of these bones can crush, tear, or put pressure on the spinal cord.  What increases the risk?  You are more likely to develop this condition if:  You are male.  You are between the ages of 16 and 30.  You are over the age of 65.  You do activities that are more likely to cause this type of injury such as:  High-speed sports.  Activities that make you more likely to fall on your head, neck, or back.  What are the signs or symptoms?  Symptoms of this condition depend on the location and severity of the injury. Symptoms may include:  Partial or total  loss of movement.  Partial or total loss of feeling.  Difficulty breathing.  Loss of bladder or bowel control.  Pain or pressure in the neck, back, or head.  Tingling in your hands, fingers, feet, or toes.  Lumps in the head or spine.  The spinal cord is divided into sections that control different parts of your body. Where you experience symptoms depends on which segment of your spinal cord is damaged:  If your neck (cervical) segment is damaged, you will have symptoms in your neck, arms, and fingers.  If your upper back (thoracic) segment is damaged, you will have symptoms in your torso.  If your lower back (lumbar) segment is damaged, you will have symptoms in your hips and legs.  If your tailbone (sacral) segment is damaged, you will have symptoms in your groin and toes.  How is this diagnosed?  This condition is diagnosed based on:  The injury you received.  Your medical history.  Your symptoms.  A physical exam.  Imaging tests, such as:  X-rays.  CT scans.  An MRI.  How is this treated?  This condition requires emergency treatment to stabilize the spine and to prevent further injury. You may be admitted to an intensive care unit (ICU), where health care providers will:  Provide breathing support.  Stabilize your blood pressure.  Treat any infections with antibiotic medicines.  Monitor your heart and lung function.  Additional treatment may involve:  Wearing a rigid neck brace to keep your neck from moving.  Being strapped to a board to prevent the rest of your spine from moving.  Having a treatment to stretch your spine in order to relieve pressure on your spinal cord (traction).  Having surgery to relieve pressure on the spine from a bone, blood clot, foreign body, or bulging disc.  Taking medicines to reduce swelling.  Taking medicines to reduce pain.  There is no cure for this condition, but long-term therapy and support from health care providers and caregivers can help with the effects of the injury.  Therapy may include:  Physical therapy. This includes exercises that help strengthen muscles, prevent stiffness, and maintain range of motion.  Occupational therapy to help you safely manage your home and work life.  Electrical stimulation of nerves. This may help restore some body functions.  Social and emotional support from friends and family members.  Therapists and support programs can help you learn to:  Take care of yourself at home and in the community.  Manage bowel and bladder problems.  [place] with mental health problems.  Manage pain.  Follow these instructions at home:  Take over-the-counter and prescription medicines only as told by your health care provider.  Do exercises as told by your health care provider.  Work closely with all your health care providers.  Make sure you have a good support system at home. Let someone know if you are struggling with anxiety or depression.  Consider seeking out a support group for people with your type of injury.  Keep all follow-up visits as directed by your health care provider. This is important.  Contact a health care provider if:  You have chills or fever.  Your symptoms gradually change or get worse.  You develop sores on your feet, back, elbows, tailbone, or hips.  You have trouble urinating or pain when urinating.  You need more support at home.  Get help right away if:  Your symptoms suddenly get worse.  You have a cough.  You have shortness of breath.  You have pain or a dull ache above the level of where your spine was injured.  You have chest pain or trouble breathing.  You have swelling, redness, or pain in your legs.  You do not feel safe at home.  Summary  A spinal cord injury can block the path of messages traveling through the spinal cord between your brain and the rest of your body.  A complete spinal cord injury causes a total loss of feeling, movement, and function below the level of the injury.  An incomplete spinal cord injury causes some loss of  feeling, movement, or function below the level of the injury.  This condition requires emergency treatment. It may be treated in an intensive care unit (ICU).  There is no cure for this condition, but long-term therapy and support from health care providers and caregivers can help with the effects of the injury.  This information is not intended to replace advice given to you by your health care provider. Make sure you discuss any questions you have with your health care provider.  Document Released: 12/08/2003 Document Revised: 11/30/2018 Document Reviewed: 01/30/2018  Elsevier Patient Education © 2020 [employer]    Clean Intermittent Catheterization, Male    Clean intermittent catheterization (CIC) is a procedure to remove urine from the bladder by placing a small, flexible tube (catheter) into the bladder though the urethra. The urethra is a tube in the body that carries urine from the bladder out of the body.  CIC may be done when:  You cannot completely empty your bladder on your own. This may be due to a blockage in the bladder or urethra.  Your bladder leaks urine. This may happen when the muscles or nerves near the bladder are not working normally, so the bladder overflows.  Your health care provider will show you how to perform CIC and will help you to become comfortable performing this procedure at home. Your health care provider will also help you to get the home care supplies that are needed for this procedure.  Supplies needed:  Germ-free (sterile), water-based lubricant.  A container for urine collection. You may also use the toilet to dispose of urine from the catheter.  A catheter. Your health care provider will determine the best size for you.  Use this catheter size: ______________________________  Sterile gloves.  Sterile gauze.  Medicated sterile swabs.  How to perform this procedure:  Most people need CIC at least 4 times per day to adequately empty the bladder. Your health care provider will  tell you how often you should perform CIC.  Number of times per day to perform CIC: ______________________________________________________________________  To perform CIC, follow these steps:  Wash your hands with soap and water. If soap and water are not available, use hand .  Prepare the supplies that you will use during the procedure. Open the catheter pack, the lubricant, and the pack of medicated sterile swabs. If you have been told to keep the procedure sterile, do not touch your supplies until you are wearing gloves.  Get in a comfortable position. Possible positions include:  Sitting on a toilet, a chair, or the edge of a bed.  Standing near a toilet.  Lying down with your head raised on pillows and your knees pointing to the ceiling. You may wish to place a waterproof mat or pad under you.  If you are using a urine collection container, position it between your legs.  Urinate, if you are able.  Put on gloves.  Apply lubricant to about 2 inches (5 cm) of the tip of the catheter.  Set the catheter down on a clean, dry surface within reach.  Gently stretch your penis out from your body. Pull back any skin that covers the end of your penis (foreskin). Clean the end of your penis with medicated sterile swabs as told by your health care provider.  Hold your penis upward at a 45-60 degree angle. This helps to straighten the urethra.  Slowly insert the lubricated catheter straight into your urethra until urine flows freely. This is usually about 6-8 inches (15-20 cm).  When urine starts to flow freely, insert the catheter 1 inch (3 cm) more. Allow urine to drain into the toilet or the urine collection container.  When urine stops flowing, slowly remove the catheter.  Note the color, amount, and odor of the urine.  Measure your urine and note the amount, if told by your health care provider.  Discard the urine in the toilet.  Clean your penis using soap and water.  Move the foreskin back in place, if  applicable.  If you are using a single-use catheter, discard the catheter and supplies.  Wash your hands with soap and water.  If you are using a reusable catheter, follow package instructions about how to clean the catheter after each use.  How often should I perform this procedure?  Do CIC to empty your bladder every 4-6 hours or as often as told by your health care provider.  If you have symptoms of too much urine in your bladder (overdistension) and you are not able to urinate, perform CIC. Symptoms of overdistension may include:  Restlessness.  Sweating or chills.  Headache.  Flushed or pale skin.  Bloated lower abdomen.  What are the risks?  Generally, this is a safe procedure, however problems may occur, including:  Infection.  Injury to the urethra.  Irritation of the urethra.  Follow these instructions at home    General instructions  Drink enough fluid to keep your urine pale yellow.  Dispose of a multiple use catheter when it becomes dry, brittle, or cloudy. This usually happens after you use the catheter for 1 week.  Avoid caffeine. Caffeine may make you need to urinate more frequently and more urgently.  When traveling, bring extra supplies with you in case of delays. Keep supplies with you in a place that you can access easily. If traveling by plane:  Make sure that the lubricant in your carry-on bag is less than 3.4 ounces (100 mL).  Use a single-use catheter. It may be difficult to clean a reusable catheter in a small bathroom.  Take over-the-counter and prescription medicines only as told by your health care provider.  Keep all follow-up visits as told by your health care provider. This is important.  Contact a health care provider if you:  Have difficulty performing CIC.  Have urine leaking during CIC.  Have:  Dark or cloudy urine.  Blood in your urine or in your catheter.  A change in the smell of your urine or discharge.  A burning feeling while you urinate.  Feel nauseous or you vomit.  Have  "pain in your abdomen, your back, or your sides below your ribs.  Have swelling or redness around the opening of your urethra.  Develop a rash or sores on your skin.  Get help right away if you have:  A fever.  Symptoms that do not go away after 3 days.  Symptoms that suddenly get worse.  Severe pain.  A decrease in the amount of urine that drains from your bladder.  Summary  Clean intermittent catheterization (CIC) is a procedure to remove urine from the bladder by placing a small, flexible tube (catheter) into the bladder though the urethra.  Your health care provider will show you how to perform CIC and will help you to become comfortable performing this procedure at home.  Most people need CIC at least 4 times per day to adequately empty the bladder.  This information is not intended to replace advice given to you by your health care provider. Make sure you discuss any questions you have with your health care provider.  Document Released: 01/20/2012 Document Revised: 04/08/2020 Document Reviewed: 08/08/2019  [employer] Patient Education © 2020 [employer]      Prevent Falls in Your Home    \"Falling once doubles your chance of falling again\"        -Center for Disease Control and Prevention    Falls in the home can lead to serious injury (fractures, brain injuries), hospitalizations, increased medical costs, and could even be fatal.  The good news is, there are many precautions you can take to avoid falls in your home and help keep you safe:     If prescribed an assistive device (walker, crutches), use as instructed by the healthcare provider\"   Remove any tripping hazards from your home, including loose cords, throw rugs and clutter  Keep a nightlight on in dark (hallways, bathrooms, etc)   Get up slowly, to make sure you feel okay before getting up  Be aware of any side effects of your medications: some medications may make you dizzy  Place a non-skid rubber mat in your shower or tub-consider a shower bench or " chair if unsteady on your feet  Wear supportive shoes or non-skid socks when moving around  Start an exercise program once approved by your provider.  If you are feeling weak following a hospital stay, talk to your doctor about home health or outpatient therapy programs designed to help rebuild your strength and endurance  Depression / Suicide Risk    As you are discharged from this [facility] facility, it is important to learn how to keep safe from harming yourself.    Recognize the warning signs:  Abrupt changes in personality, positive or negative- including increase in energy   Giving away possessions  Change in eating patterns- significant weight changes-  positive or negative  Change in sleeping patterns- unable to sleep or sleeping all the time   Unwillingness or inability to communicate  Depression  Unusual sadness, discouragement and loneliness  Talk of wanting to die  Neglect of personal appearance   Rebelliousness- reckless behavior  Withdrawal from people/activities they love  Confusion- inability to concentrate     If you or a loved one observes any of these behaviors or has concerns about self-harm, here's what you can do:  Talk about it- your feelings and reasons for harming yourself  Remove any means that you might use to hurt yourself (examples: pills, rope, extension cords, firearm)  Get professional help from the community (Mental Health, Substance Abuse, psychological counseling)  Do not be alone:Call your Safe Contact- someone whom you trust who will be there for you.  Call your local CRISIS HOTLINE 437-0914 or 655-427-9795  Call your local Children's Mobile Crisis Response Team [place] (796) 410-1729 or www.Caring.com  Call the toll free National Suicide Prevention Hotlines   National Suicide Prevention Lifeline 669-545-QQXH (0791)  National Hope Line Network 800-SUICIDE (021-3177)    Occupational Therapy Discharge Instructions for Bharath Diaz    6/8/2023    Level of  Assist Required for Eating: Able to Complete Eating without Assist  Level of Assist Required for Grooming: Able to Complete Grooming without Assist  Level of Assist Required for Dressing: Requires Supervision with Dressing  Equipment for Dressing: Sock Aid, Reacher, Long Handled Shoe Horn, Dressing Stick  Level of Assist Required for Toileting: Requires Physical Assist with Toileting  Level of Assist Required for Toilet Transfer: Requires Physical Assist with Toilet Transfer  Equipment for Toilet Transfer: Raised Toilet Seat with Arms, Grab Bars by Toilet  Level of Assist Required for Bathing: Requires Physical Assist with Bathing  Equipment for Bathing: Tub Transfer Bench, Hand Held Shower Head, Long Handled Sponge  Level of Assist Required for Shower Transfer: Requires Physical Assist with Shower Transfer  Equipment for Shower Transfer: Tub Transfer Bench  Level of Assist Required for Home Mgmt: Requires Physical Assist with Home Management  Level of Assist Required for Meal Prep: Requires Physical Assist with Meal Preparation  Driving: May not Drive, Please Contact Physician for Further Information  Home Exercise Program: Refer to Home Exercise Program Handout for Details  Bharath! You did it dude! Please be safe out there, and TAKE YOUR TIME! Take care of yourself and we can't wait to see you back here running with Dr. Sahni ;) Let us know if you need anything! Adriane HOLT/DIAMOND [email or web address]    Physical Therapy Discharge Instructions for Bharath Joe    6/8/2023    Level of Assist Required for Ambulation: Physical Assist on Flat Surfaces, Should Not Attempt Curbs at This Time, Should Not Attempt Stairs at This Time  Distance Patient May Ambulate: 10-20ft  Device Recommended for Ambulation: Front-Wheeled Walker  Level of Assist Required to Propel Wheelchair: Requires No Assist  Level of Assist Required for Transfers: Supervision  Device Recommended for Transfers:  (no device pivot transfers,  physical assist for walker transfers)  Home Exercise Program: Refer to Home Exercise Program Handout for Details  Prosthesis / Orthosis Recommendation / Location:  (TLSO brace when out of bed and bilateral AFOs for standing activity)    Keep up the hard work and making progress, Bharath! It has been awesome to see your progress. Keep us updated!- Mikey, PT, DPT

## 2023-06-08 NOTE — DISCHARGE PLANNING
DPA    Home health sent to [facility] . Spoke with Jaylin who declined because they dont take patient commercial insurance.     Order now sent to [facility].   P# [phone]  F# [phone]    DME delivered from [facility].     Additional equipment order sent to [facility].  P#[phone]  F#[phone]  If patient rents bed it will be a payment of 250 upfront and then 150/ month.

## 2023-06-08 NOTE — PROGRESS NOTES
Physician's Interdisciplinary Team Conference Note    Nursing staff, Physical Therapist(s), Occupational Therapist(s), Case Management and Pharmacy staff were present and reported. Where appropriate Speech, Respiratory, and Recreational Therapists were present and reported.     I, Mathew Sahni D.O., was present and led the interdisciplinary team conference on 6/8/2023.  I led the IDT conference and agree with the IDT conference documentation and plan of care as noted below.     RN:  Diet    % Meal     Pain    Sleep    Bowel BTP daily   Bladder ICP   In's & Out's      PT:  Bed Mobility    Transfers Sup   Mobility    Stairs    Family training complete    OT:  Eating    Grooming    Bathing    UB Dressing    LB Dressing    Toileting CGA   Shower & Transfer      Accessibly hotel for the night    CM:  Continues to work on disposition and DME needs.       DC/Disposition:      6/8    Above is an abridged version of the patient's status and plan of care.  For complete details please see Weekly Interdisciplinary Team Conference Note from this date.     All reporting clinicians have a working knowledge of this patient's plan of care.
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
## 2023-06-08 NOTE — PROGRESS NOTES
Patient discharged to home per order.  Discharge instructions reviewed with patient and family; they verbalize understanding and signed copies placed in chart.  Patient has all belongings; signed copy of form in chart.  Patient left facility at 1250 via w/c accompanied by rehab staff and family.  Have enjoyed working with this pleasant patient.

## 2023-06-08 NOTE — THERAPY
"Physical Therapy   Discharge Summary     Patient Name: Bharath Diaz  Age:  42 y.o., Sex:  male  Medical Record #: [id number]  Today's Date: 6/8/2023     Precautions  Precautions: Fall Risk, TLSO (Thoracolumbosacral orthosis), Spinal / Back Precautions   Comments: possible R partial infraspinatus tear; R rib 1,8,9 fxs; T11-L1 posterior fusion; L3 AIS B    Subjective    \"This chair is so much quicker\" -referring to rodolfo BENNETT     Objective       06/07/23 1401   PT Charge Group   PT Therapeutic Exercise (Units) 1   PT Therapeutic Activities (Units) 5   PT Total Time Spent   PT Individual Total Time Spent (Mins) 90   Gait Functional Level of Assist    Gait Level Of Assist Minimal Assist   Assistive Device Front Wheel Walker   Distance (Feet) 5   # of Times Distance was Traveled 4   Deviation Ataxic;Bradykinetic  (B LE custom AFO)   Wheelchair Functional Level of Assist   Wheelchair Assist Modified Independent   Distance Wheelchair (Feet or Distance) 1100+   Transfer Functional Level of Assist   Bed, Chair, Wheelchair Transfer Standby Assist   Bed Chair Wheelchair Transfer Description Set-up of equipment;Supervision for safety;Verbal cueing   Bed Mobility    Supine to Sit Modified Independent   Sit to Supine Modified Independent   Sit to Stand Contact Guard Assist   Scooting Modified Independent   Rolling Modified Independent   Interdisciplinary Plan of Care Collaboration   IDT Collaboration with  Family / Caregiver;Occupational Therapist   Patient Position at End of Therapy Seated;Call Light within Reach;Family / Friend in Room   Collaboration Comments POC, FT with mother and sister   Roll Left and Right   Assistance Needed Independent   CARE Score - Roll Left and Right 6   Sit to Lying   Assistance Needed Independent   CARE Score - Sit to Lying 6   Lying to Sitting on Side of Bed   Assistance Needed Independent   CARE Score - Lying to Sitting on Side of Bed 6   Sit to Stand   Assistance Needed Incidental touching "   CARE Score - Sit to Stand 4   Chair/Bed-to-Chair Transfer   Assistance Needed Supervision   CARE Score - Chair/Bed-to-Chair Transfer 4   Car Transfer   Assistance Needed Incidental touching   CARE Score - Car Transfer 4   Walk 10 Feet   Assistance Needed Physical assistance   Physical Assistance Level 25% or less   CARE Score - Walk 10 Feet 3   Walk 50 Feet with Two Turns   Reason if not Attempted Safety concerns   CARE Score - Walk 50 Feet with Two Turns 88   Walk 150 Feet   Reason if not Attempted Safety concerns   CARE Score - Walk 150 Feet 88   Walking 10 Feet on Uneven Surfaces   Reason if not Attempted Activity not applicable   CARE Score - Walking 10 Feet on Uneven Surfaces 9   1 Step (Curb)   Assistance Needed Physical assistance   Physical Assistance Level Total assistance   CARE Score - 1 Step (Curb) 1   4 Steps   Reason if not Attempted Activity not applicable   CARE Score - 4 Steps 9   12 Steps   Reason if not Attempted Activity not applicable   CARE Score - 12 Steps 9   Picking Up Object   Reason if not Attempted Activity not applicable   CARE Score - Picking Up Object 9   Wheel 50 Feet with Two Turns   Assistance Needed Independent   CARE Score - Wheel 50 Feet with Two Turns 6   Type of Wheelchair/Scooter Manual   Wheel 150 Feet   Assistance Needed Independent   CARE Score - Wheel 150 Feet 6   Type of Wheelchair/Scooter Manual   P.T. Discharge Summary   Discharge Location Relative / Friend's Home   Patient Discharging with Assist of Family   Level of Supervision Required Upon Discharge Intermittent Supervision   Recommended Equipment for Discharge Front-Wheeled Walker;Manual Wheelchair   Recommeded Services Upon Discharge Home Health Physical Therapy   Long Term Goals Met 5   Long Term Goals Not Met 1   Reason(s) for Goals Not Met limited standing tolerance   Criteria for Termination of Services Maximum Function Achieved for Inpatient Rehabilitation     Completed family training with pt's sister and  mother for short distance ambulation FWW, stand step transfers FWW, and WC up/down curb.  Completed 6 minute wheel test in standard 448ft and lightweight K005 WC 1168ft  Reviewed HEP with below handouts provided  Reviewed relevant precautions to maximize safety during home and community mobility.     Patient reports understanding and agreement c/ discharge plan.                     Assessment:         Bharath has participated well in their inpatient rehabilitation program with  significant functional gains as above in flowsheet. They are now appropriate for discharge to his sister' house with intermittent family support and HHPT follow up.     Tentative discharge on 06/08/23.     Patient passport completed.         Plan    DC        Physical Therapy Problems (Active)       There are no active problems.

## 2023-06-08 NOTE — DISCHARGE PLANNING
Follow-up Information discussed with pt plus his sister Sary and mother.  Dc home today 6/8; pt will be staying in [facility]  [address]  [facility] [address] [phone]   Follow up on 6/22/2023 at 10:00am with [facility] [address] [address] [facility].  [address]  [place]  [address]  [phone]  Please call to schedule for follow up for primary care if needed to be seen before 6/22/2023      Vita Barger M.D. Neurosurgeon  [address]  [place] [address] [phone]   Follow up on 6/8/2023 Thursday @ 3:00pm with 2:30pm check in     [employer]  [address]  [place] [address]  [phone]  Provided for wheelchair and cushion; they will contact you once chair is built and ready for delivery.    Other DME - all ordered by family     [facility]  [address]  [place] [address]  [phone]  Provider for front wheeled walker     [facility]  [address]  [place] [address]  [phone]  A referral has been sent for home health with a RN, PT, and OT      Yonatan Graham MD - Urology  [address]  [place]  [address]  [phone]  Please call to schedule follow up appointment with Urology     Georgie Burns D.O. Out Patient Rehab MD  [address]  [place] [id number] [phone]   Please call to prabhuudle follow up appointment with Out Patient Physiatry when you return to [place].    Catheter Supplies ordered through [employer] [phone].  They will be delivered to your sister's home.       Disabled parking placard to be mailed to sister's home.    [facility]  [address]  [place] [address]  [phone]  Provider for bilateral AFO's.

## 2023-06-08 NOTE — DISCHARGE PLANNING
Case Management/IDT follow up.   Met with pt / family providing update from IDT and discussed plan of care; confirmed dc date of today; family training completed; referral sent for home health for RN/PT/OT; follow up with PCP/Dr Barger Neurosurgeon (appt today); Urology; out pt PMR  all dme ordered; family order DME what insurance does not cover; catheter supplies provided and ordered.

## 2023-06-08 NOTE — PROGRESS NOTES
Received shift report and assumed care of patient.  Patient awake, calm and stable, currently positioned in bed for comfort and safety; call light within reach. 4/10 back pain at this time. Previous medications given. Will continue to monitor.

## 2023-06-09 NOTE — DISCHARGE PLANNING
Robbi   Tc from pt's mom, Bianca; they are requesting a order for a hospital bed sent to [facility] in [place] as they want to rent one and pay privately as they understand insurance will not cover.     [facility]

## 2023-06-16 NOTE — DISCHARGE PLANNING
Late Entry:  [facility] needed packet to process HH order for client.  Sent packet with updated HH order.  6.15.23 1734

## 2023-06-20 DIAGNOSIS — N31.9 NEUROGENIC BLADDER: ICD-10-CM

## 2023-06-23 NOTE — CARE PLAN
Problem: Balance  Goal: STG-Within one week, patient will maintain static standing balance with B UE support and Ambar for 5 minutes to improve endurance and standing tolerance  Outcome: Met     Problem: PT-Long Term Goals  Goal: LTG-By discharge, patient will propel wheelchair 150ft [place]man  Outcome: Met  Goal: LTG-By discharge, patient will transfer one surface to another with CGA  Outcome: Met  Goal: LTG-By discharge, patient will perform home exercise program independently  Outcome: Met  Goal: LTG-By discharge, patient will transfer in/out of a car with modA  Outcome: Met  Goal: LTG-By discharge, patient will perform bed mobility independently  Outcome: Met     Problem: PT-Long Term Goals  Goal: LTG-By discharge, patient will tolerate standing for 10 min and intermittent UE support with SBA in order to facilitate performance of ADLs  Outcome: Discharged - Not Met  Goal: LTG-By discharge, patient will ambulate 50ft Ambar using LRAD  Outcome: Discharged - Not Met     
  Problem: Balance  Goal: STG-Within one week, patient will maintain static standing balance with B UE support and Ambar for 5 minutes to improve endurance and standing tolerance  Outcome: Progressing  Note: 3 min     Problem: Mobility  Goal: STG-Within one week, patient will propel wheelchair 50ft with SPV  Outcome: Met  Goal: STG-Within one week, patient will ambulate 10ft in parallel bars with maxA and WC follow  Outcome: Met     Problem: Mobility Transfers  Goal: STG-Within one week, patient will perform bed mobility with Ambar  Outcome: Met  Goal: STG-Within one week, patient will sit to stand with CGA-Ambar in parallel bars  Outcome: Met  Goal: STG-Within one week, patient will transfer bed to chair with maxA x1 person  Outcome: Met     
  Problem: Bathing  Goal: STG-Within one week, patient will bathe at Min A level using DME/AE PRN.  Outcome: Met     Problem: Dressing  Goal: STG-Within one week, patient will dress UB at CGA level including don/doff of TLSO.  Outcome: Met  Goal: STG-Within one week, patient will dress LB at Mod A using AE/compensatory strategies PRN.  Outcome: Met     Problem: Toileting  Goal: STG-Within one week, patient will complete toileting tasks at Max A x1 using DME/AE PRN.  Outcome: Met     Problem: Functional Transfers  Goal: STG-Within one week, patient will transfer to toilet at Max A x1 using DME/AE PRN.  Outcome: Met     
  Problem: Bathing  Goal: STG-Within one week, patient will bathe at Min A level using DME/AE PRN.  Outcome: Not Met     Problem: Dressing  Goal: STG-Within one week, patient will dress UB at CGA level including don/doff of TLSO.  Outcome: Not Met  Goal: STG-Within one week, patient will dress LB at Mod A using AE/compensatory strategies PRN.  Outcome: Not Met     Problem: Toileting  Goal: STG-Within one week, patient will complete toileting tasks at Max A x1 using DME/AE PRN.  Outcome: Not Met     Problem: Functional Transfers  Goal: STG-Within one week, patient will transfer to toilet at Max A x1 using DME/AE PRN.  Outcome: Not Met     
  Problem: Bladder / Voiding  Goal: Patient will establish and maintain bladder regimen  Note: Dr. Sahni made aware of patients high PVR/ unable to void.      Problem: Pain - Standard  Goal: Alleviation of pain or a reduction in pain to the patient’s comfort goal  Flowsheets  Taken 5/25/2023 1206  Pain Rating Scale (NPRS): 6  Taken 5/25/2023 0800  Non Verbal Scale:   Calm   Unlabored Breathing   The patient is Stable - Low risk of patient condition declining or worsening      
  Problem: Bowel Elimination  Goal: Patient will participate in bowel management program  Outcome: Progressing  Note: Transfer Level & Assistive Devices Used: SBA with standing/sitting transfer assistance machine    Patient Position: toilet (E.g., bed, bed side commode, mobile shower commode, commode over toilet, regular toilet)     Adaptive Equipment Used? N/a (E.g., digital stimulator, toileting aid for hygiene, suppository )      Patient Sensation and Bowel Urgency Present? yes     Incontinence? no     BM Results: large #4    1. Caregiver Present and actively participating in training? no    2. Patient Demonstrated Understanding with Bowel Medications and Purpose: yes     3. Patient Participation with Suppository Placement: no     4. Patient Participation with Digital Stimulation: no     5. Patient Participation with Clothing Management: yes     6. Patient Participation with Hygiene: no     
  Problem: Knowledge Deficit - Standard  Goal: Patient and family/care givers will demonstrate understanding of plan of care, disease process/condition, diagnostic tests and medications  Outcome: Progressing     Problem: Skin Integrity  Goal: Skin integrity is maintained or improved  Outcome: Progressing   The patient is Stable - Low risk of patient condition declining or worsening    Shift Goals  Clinical Goals: Safety  Patient Goals: pain control, sleep well  Family Goals: N/A    
  Problem: Knowledge Deficit - Standard  Goal: Patient and family/care givers will demonstrate understanding of plan of care, disease process/condition, diagnostic tests and medications  Outcome: Progressing     Problem: Skin Integrity  Goal: Skin integrity is maintained or improved  Outcome: Progressing   The patient is Stable - Low risk of patient condition declining or worsening    Shift Goals  Clinical Goals: Safety  Patient Goals: pain control, sleep well  Family Goals: pain control    
  Problem: Neurogenic Bladder  Goal: Patient will demonstrate self-cath technique using clean technique and care of the catheter  Note: Patient able to self-cath this shift with writer present. Patient used urojet prior and tolerated well. Patient understands importance.     Problem: Pain - Standard  Goal: Alleviation of pain or a reduction in pain to the patient’s comfort goal  Flowsheets (Taken 5/26/2023 0800)  Non Verbal Scale:   Calm   Unlabored Breathing  Pain Rating Scale (NPRS): 8   The patient is Stable - Low risk of patient condition declining or worsening      
  Problem: OT Long Term Goals  Goal: LTG-By discharge, patient will perform bathroom transfers at Min A x1-Mod I level using DME/AE PRN.  Outcome: Met     Problem: Functional Transfers  Goal: STG-Within one week, patient will transfer to toilet at CGA x1 using DME/AE PRN.  Outcome: Met  Goal: STG-Within one week, patient will transfer to tub/shower using tub transfer bench at CGA level using DME PRN.  Outcome: Met     Problem: IADL's  Goal: STG-Within one week, patient will access kitchen area at w/c level with SPV using AE PRN.  Outcome: Met     Problem: OT Long Term Goals  Goal: LTG-By discharge, patient will complete basic self care tasks at Min A x1-Mod I level using DME/AE PRN.  Outcome: Discharged - Not Met  Goal: LTG-By discharge, patient will complete basic home management at Min A x1-Mod I level using DME/AE PRN.  Outcome: Discharged - Not Met     Problem: Bathing  Goal: STG-Within one week, patient will bathe at CGA level using DME/AE PRN.  Outcome: Discharged - Not Met     Problem: Dressing  Goal: STG-Within one week, patient will dress UB with clothing retrieval at Mod I level, including TLSO don/doff.  Outcome: Discharged - Not Met     Problem: Toileting  Goal: STG-Within one week, patient will complete toileting tasks at Max A x1 using DME/AE PRN.  Outcome: Discharged - Not Met     
  Problem: Recreation Therapy  Goal: LTG-By discharge, patient will participate in a community re-integration outing of his planning.   Outcome: Met  Goal: LTG-By discharge, patient will identify and demonstrate three adaptations to leisure interests.   Outcome: Met     
  Problem: Recreation Therapy  Goal: STG-Within one week, patient will actively engage in planning of community re-integration outing.  Outcome: Met  Goal: STG-Within one week, patient will identify two adaptations to leisure interests.   Outcome: Met  Goal: LTG-By discharge, patient will participate in a community re-integration outing of his planning.   Outcome: Progressing  Goal: LTG-By discharge, patient will identify and demonstrate three adaptations to leisure interests.   Outcome: Progressing     
"  Problem: Knowledge Deficit - Standard  Goal: Patient and family/care givers will demonstrate understanding of plan of care, disease process/condition, diagnostic tests and medications  Outcome: Progressing  Note: Pt agrees with plan of care tonight regarding medications and safety.  Will continue to monitor patient.      Problem: Fall Risk - Rehab  Goal: Patient will remain free from falls  Outcome: Progressing  Note: Zeinab Charles Fall risk Assessment Score:  18    High fall risk Interventions   - Alarming seatbelt  - Wander guard  - Bed and strip alarm   - Yellow sign by the door   - Yellow wrist band \"Fall risk\"  - Room near to the nurse station  - Do not leave patient unattended in the bathroom  - Fall risk education provided      Problem: Pain - Standard  Goal: Alleviation of pain or a reduction in pain to the patient’s comfort goal  Outcome: Progressing  Note: C/o pain to medicated with scheduled Flexeril, Gabapentin, MS Contin and Zanaflex.  Has + relief.  See MAR and doc flow sheet.     The patient is Stable - Low risk of patient condition declining or worsening    Shift Goals  Clinical Goals: Safety  Patient Goals: Sleep well  Family Goals: N/A    Progress made toward(s) clinical / shift goals:  progressing        "
"  Problem: Knowledge Deficit - Standard  Goal: Patient and family/care givers will demonstrate understanding of plan of care, disease process/condition, diagnostic tests and medications  Outcome: Progressing  Note: Pt agrees with plan of care tonight regarding medications and safety.  Will continue to monitor patient.      Problem: Fall Risk - Rehab  Goal: Patient will remain free from falls  Outcome: Progressing  Note: Zeinab Charles Fall risk Assessment Score:  19    High fall risk Interventions   - Alarming seatbelt  - Wander guard  - Bed and strip alarm   - Yellow sign by the door   - Yellow wrist band \"Fall risk\"  - Room near to the nurse station  - Do not leave patient unattended in the bathroom  - Fall risk education provided      Problem: Pain - Standard  Goal: Alleviation of pain or a reduction in pain to the patient’s comfort goal  Outcome: Progressing  Note: C/o pain to medicated with scheduled Flexeril, Gabapentin, MS Contin and Zanaflex.  Has + relief.  See MAR and doc flow sheet.     The patient is Stable - Low risk of patient condition declining or worsening    Shift Goals  Clinical Goals: Safety  Patient Goals: Sleep well  Family Goals: N/A    Progress made toward(s) clinical / shift goals:  progressing        "
"The patient is Stable - Low risk of patient condition declining or worsening    Shift Goals  Clinical Goals: Safety  Patient Goals:   Family Goals: N/A    Progress made toward(s) clinical / shift goals:    Problem: Fall Risk - Rehab  Goal: Patient will remain free from falls  Outcome: Progressing     Zeinab Charles Fall risk Assessment : 19    High fall risk Interventions   - Bed and strip alarm   - Yellow sign by the door   - Yellow wrist band \"Fall risk\"  - Room near to the nurse station  - Do not leave patient unattended in the bathroom  - Fall risk education provided    Pt uses call light consistently and appropriately. Waits for assistance does not attempt self transfer this shift. Able to verbalize needs.   "
"The patient is Stable - Low risk of patient condition declining or worsening    Shift Goals  Clinical Goals: Safety, pain management  Patient Goals: Pain management  Family Goals: pain control    Patient is not progressing towards the following goals:    Problem: Fall Risk - Rehab  Goal: Patient will remain free from falls  Outcome: Not Met  Note: Zeinab Charlse Fall risk Assessment Score: 15    High fall risk Interventions   - Bed and strip alarm   - Yellow sign by the door   - Yellow wrist band \"Fall risk\"  - Fall risk education provided     Problem: Bowel Elimination  Goal: Patient will participate in bowel management program  Outcome: Not Progressing  Note: Transfer Level & Assistive Devices Used: SBA with transfer    Patient Position: toilet (E.g., bed, bed side commode, mobile shower commode, commode over toilet, regular toilet)     Adaptive Equipment Used? no (E.g., digital stimulator, toileting aid for hygiene, suppository )      Patient Sensation and Bowel Urgency Present? yes     Incontinence? N/a     BM Results: no    1. Caregiver Present and actively participating in training? no    2. Patient Demonstrated Understanding with Bowel Medications and Purpose: yes     3. Patient Participation with Suppository Placement: no     4. Patient Participation with Digital Stimulation: yes     5. Patient Participation with Clothing Management: yes     6. Patient Participation with Hygiene: no           "
"The patient is Stable - Low risk of patient condition declining or worsening    Shift Goals  Clinical Goals: pain, safety  Patient Goals: pain, therapy  Family Goals: N/A    Problem: Skin Integrity  Goal: Skin integrity is maintained or improved  Outcome: Progressing  Note:   Alexis Score: 16    Patient's skin remains intact and free from new or accidental injury this shift; no s/s of infection. RN wound protocol checked. Patient is on low air loss mattress. Encouraged hydration and educated about the importance of nutrition to keep skin integrity. Will continue to monitor.       Problem: Fall Risk - Rehab  Goal: Patient will remain free from falls  Outcome: Progressing  Note: Zeinab Charles Fall risk Assessment Score: 21    High fall risk Interventions   - Alarming seatbelt  - Bed and strip alarm   - Yellow sign by the door   - Yellow wrist band \"Fall risk\"  - Room near to the nurse station  - Do not leave patient unattended in the bathroom  - Fall risk education provided     "
"The patient is Stable - Low risk of patient condition declining or worsening    Shift Goals  Clinical Goals: safety, pain management  Patient Goals: Pain control, sleep  Family Goals: pain control    Progress made toward(s) clinical / shift goals:      Problem: Neurogenic Bladder  Goal: Patient will demonstrate ability to take care of indwelling catheter  Outcome: Met  Note: Patient self-caths at q4h intervals     Problem: Bowel Elimination  Goal: Patient will participate in bowel management program  Outcome: Progressing  Note: Patient sat on toilet before bed and had a large BM. Patient refused AM bowel program.     Patient is not progressing towards the following goals:    Problem: Fall Risk - Rehab  Goal: Patient will remain free from falls  Outcome: Not Met  Note: Zeinab Charles Fall risk Assessment Score: 15    High fall risk Interventions   - Bed and strip alarm   - Yellow sign by the door   - Yellow wrist band \"Fall risk\"  - Fall risk education provided     "
"The patient is Stable - Low risk of patient condition declining or worsening    Shift Goals  Clinical Goals: safety, pain management  Patient Goals: Pain control, sleep  Family Goals: pain control    Progress made toward(s) clinical / shift goals:      Problem: Risk for Aspiration  Goal: Patient's risk for aspiration will be absent or decrease  Outcome: Met  Note: Patient is not at risk for aspiration     Patient is not progressing towards the following goals:    Problem: Fall Risk - Rehab  Goal: Patient will remain free from falls  Outcome: Not Met  Note: Zeinab Charles Fall risk Assessment Score: 12    Moderate fall risk Interventions  - Bed and strip alarm   - Yellow sign by the door   - Yellow wrist band \"Fall risk\"  - Fall risk education provided     "
"The patient is Watcher - Medium risk of patient condition declining or worsening    Shift Goals  Clinical Goals: Safety, pain management  Patient Goals: Pain management  Family Goals: pain control      Problem: Fall Risk - Rehab  Goal: Patient will remain free from falls  Outcome: Progressing    Zeinab Charles Fall risk Assessment Score: 15    High fall risk Interventions   - Alarming seatbelt  - Wander guard  - Bed and strip alarm   - Yellow sign by the door   - Yellow wrist band \"Fall risk\"  - Room near to the nurse station  - Do not leave patient unattended in the bathroom  - Fall risk education provided         Problem: Neurogenic Bladder  Goal: Patient will demonstrate self-cath technique using clean technique and care of the catheter  Outcome: Progressing     Patient self-caths without difficulty.  "
The patient is Stable - Low risk of patient condition declining or worsening    
The patient is Stable - Low risk of patient condition declining or worsening    Clinical goal: pain, safety  Patient goal: pain, therapy       Progress made toward(s) clinical / shift goals:    Problem: Knowledge Deficit - Standard  Goal: Patient and family/care givers will demonstrate understanding of plan of care, disease process/condition, diagnostic tests and medications  Outcome: Progressing   --Pt educated on rehab unit routine, safety, pain management & therapy.  Able to verbalize and demonstrate understanding.      Problem: Skin Integrity  Goal: Skin integrity is maintained or improved  Outcome: Progressing     Problem: Bowel Elimination  Goal: Patient will participate in bowel management program  Outcome: Progressing     Problem: Skin Integrity  Goal: Patient's skin integrity will be maintained or improve  Outcome: Progressing       Patient is not progressing towards the following goals:None             
The patient is Stable - Low risk of patient condition declining or worsening    Problem: Knowledge Deficit - Standard  Goal: Patient and family/care givers will demonstrate understanding of plan of care, disease process/condition, diagnostic tests and medications  Outcome: Progressing. Reviewed POC, all questions answered.        Problem: Fall Risk - Rehab  Goal: Patient will remain free from falls  Outcome: Progressing. Call light within reach, pt educated to use for assistance for safe transferring. Pt demonstrates good safety technique this shift.        Shift Goals  Clinical Goals: Safety  Patient Goals: Pain control, sleep  Family Goals: pain control          
The patient is Stable - Low risk of patient condition declining or worsening    Problem: Knowledge Deficit - Standard  Goal: Patient and family/care givers will demonstrate understanding of plan of care, disease process/condition, diagnostic tests and medications  Outcome: Progressing. Reviewed POC, all questions answered.        Problem: Fall Risk - Rehab  Goal: Patient will remain free from falls  Outcome: Progressing. Call light within reach, pt educated to use for assistance for safe transferring. Pt demonstrates good safety technique this shift.        Shift Goals  Clinical Goals: Safety  Patient Goals: Pain control, sleep  Family Goals: pain control          
The patient is Stable - Low risk of patient condition declining or worsening    Problem: Knowledge Deficit - Standard  Goal: Patient and family/care givers will demonstrate understanding of plan of care, disease process/condition, diagnostic tests and medications  Outcome: Progressing. Reviewed POC, all questions answered.        Problem: Skin Integrity  Goal: Skin integrity is maintained or improved  Outcome: Progressing. Pt's skin remains free from new or accidental injury. Skin protective measures in place.        Problem: Fall Risk - Rehab  Goal: Patient will remain free from falls  Outcome: Progressing. Call light within reach, pt educated to use for assistance for safe transferring. Pt demonstrates good safety technique this shift.        Shift Goals  Clinical Goals: Safety  Patient Goals: pain control, sleep well  Family Goals: pain control        
The patient is Stable - Low risk of patient condition declining or worsening    Problem: Knowledge Deficit - Standard  Goal: Patient and family/care givers will demonstrate understanding of plan of care, disease process/condition, diagnostic tests and medications  Outcome: Reviewed POC, all questions answered.        Problem: Fall Risk - Rehab  Goal: Patient will remain free from falls  Outcome: Call light within reach, pt educated to use for assistance for safe transferring. Pt demonstrates good safety technique this shift.        Shift Goals  Clinical Goals: Safety  Patient Goals: pain control, sleep  Family Goals: N/A          
The patient is Stable - Low risk of patient condition declining or worsening    Problem: Knowledge Deficit - Standard  Goal: Patient and family/care givers will demonstrate understanding of plan of care, disease process/condition, diagnostic tests and medications  Outcome: Reviewed POC, all questions answered.        Problem: Skin Integrity  Goal: Skin integrity is maintained or improved  Outcome: Pt's skin remains free from new or accidental injury. Skin protective measures in place.        Shift Goals  Clinical Goals: Safety  Patient Goals: Pain control, sleep  Family Goals: N/A        
The patient is Stable - Low risk of patient condition declining or worsening    Shift Goals  Clinical Goals: Pain management  Patient Goals: Pain  management  Family Goals: N/A    Progress made toward(s) clinical / shift goals:    Problem: Knowledge Deficit - Standard  Goal: Patient and family/care givers will demonstrate understanding of plan of care, disease process/condition, diagnostic tests and medications  Outcome: Progressing   Patient educated on the POC and medications administered. All questions and concerns addressed at this time  Problem: Neurogenic Bladder  Goal: Patient will demonstrate self-cath technique using clean technique and care of the catheter  Outcome: Progressing   Patient is able to perform appropriate self cathing techniques.   Problem: Skin Integrity  Goal: Patient's skin integrity will be maintained or improve  Outcome: Progressing   Skin is clean, dry, and intake. Patient is on a low air loss mattress. Incision site is SANDEE, no signs of infections.    Patient is not progressing towards the following goals:      
The patient is Stable - Low risk of patient condition declining or worsening    Shift Goals  Clinical Goals: Pain management  Patient Goals: pain control, sleep  Family Goals: N/A    Progress made toward(s) clinical / shift goals:    Problem: Pain - Standard  Goal: Alleviation of pain or a reduction in pain to the patient’s comfort goal  Outcome: Progressing       Patient reports satisfactory pain control and decrease intensity after pharmacological pain management.     
The patient is Stable - Low risk of patient condition declining or worsening    Shift Goals  Clinical Goals: Safety, pain management  Patient Goals: Pain management, self cath  Family Goals: pain control    Progress made toward(s) clinical / shift goals:      Problem: Fall Risk - Rehab  Goal: Patient will remain free from falls  Outcome: Met  Note: Patient is a LOW FALL RISK.  Patient uses call light consistently for staff assistance. Patient has good safety awareness, no impulsivity observed.      Problem: Neurogenic Bladder  Goal: Patient will demonstrate self-cath technique using clean technique and care of the catheter  Outcome: Met  Note: Patient straight caths himself at q4h intervals.     Problem: Bowel Elimination  Goal: Patient will participate in bowel management program  Outcome: Met  Note: Patient refused AM bowel program but did have a large BM on the toilet before bedtime.     Problem: Infection  Goal: Patient will remain free from infection  Outcome: Met  Note: No s/s of infection present      
The patient is Stable - Low risk of patient condition declining or worsening    Shift Goals  Clinical Goals: pain, safety  Patient Goals: pain, therapy  Family Goals: N/A      Problem: Skin Integrity  Goal: Skin integrity is maintained or improved  Outcome: Progressing     Problem: Fall Risk - Rehab  Goal: Patient will remain free from falls  Outcome: Progressing     Problem: Neurogenic Bladder  Goal: Patient will demonstrate ability to take care of indwelling catheter  Outcome: Progressing     Problem: Skin Integrity  Goal: Patient's skin integrity will be maintained or improve  Outcome: Progressing     
The patient is Stable - Low risk of patient condition declining or worsening    Shift Goals  Clinical Goals: safety  Patient Goals: pain management, bowel program  Family Goals: N/A    Problem: Skin Integrity  Goal: Skin integrity is maintained or improved  Outcome: Progressing     Problem: Fall Risk - Rehab  Goal: Patient will remain free from falls  Outcome: Progressing     Problem: Bowel Elimination  Goal: Patient will participate in bowel management program  Outcome: Progressing     Problem: Skin Integrity  Goal: Patient's skin integrity will be maintained or improve  Outcome: Progressing     Problem: Pain - Standard  Goal: Alleviation of pain or a reduction in pain to the patient’s comfort goal  Outcome: Progressing     
The patient is Stable - Low risk of patient condition declining or worsening    Shift Goals  Clinical Goals: safety  Patient Goals: pain management, bowel program  Family Goals: pain control      Problem: Fall Risk - Rehab  Goal: Patient will remain free from falls  Outcome: Progressing     Problem: Mobility  Goal: Patient's capacity to carry out activities will improve  Outcome: Progressing     Problem: Pain - Standard  Goal: Alleviation of pain or a reduction in pain to the patient’s comfort goal  Outcome: Progressing     
The patient is Stable - Low risk of patient condition declining or worsening    Shift Goals  Clinical Goals: safety  Patient Goals: pain management, bowel program  Family Goals: pain control      Problem: Skin Integrity  Goal: Skin integrity is maintained or improved  Outcome: Progressing     Problem: Fall Risk - Rehab  Goal: Patient will remain free from falls  Outcome: Progressing     Problem: Pain - Standard  Goal: Alleviation of pain or a reduction in pain to the patient’s comfort goal  Outcome: Progressing     
The patient is Stable - Low risk of patient condition declining or worsening    Shift Goals  Clinical Goals: safety  Patient Goals: pain management, bowel program  Family Goals: pain control    Progress made toward(s) clinical / shift goals:      Problem: Fall Risk - Rehab  Goal: Patient will remain free from falls  Outcome: Progressing  Note: Patient is a LOW FALL RISK.  Patient uses call light consistently for staff assistance. Patient has good safety awareness, no impulsivity observed.      Problem: Neurogenic Bladder  Goal: Patient will demonstrate ability to take care of indwelling catheter  Outcome: Progressing  Note: Patient is self-cathing around q4h frequency.     Problem: Bowel Elimination  Goal: Patient will participate in bowel management program  Outcome: Progressing  Note: Transfer Level & Assistive Devices Used: SBA with pullup seat    Patient Position: toilet (E.g., bed, bed side commode, mobile shower commode, commode over toilet, regular toilet)     Adaptive Equipment Used? N/a (E.g., digital stimulator, toileting aid for hygiene, suppository )      Patient Sensation and Bowel Urgency Present? yes     Incontinence? no     BM Results: medium with fleets enema and awaiting results from [facility]     1. Caregiver Present and actively participating in training? no    2. Patient Demonstrated Understanding with Bowel Medications and Purpose: yes     3. Patient Participation with Suppository Placement: no     4. Patient Participation with Digital Stimulation: no     5. Patient Participation with Clothing Management: no     6. Patient Participation with Hygiene: no        (If patient meets all 6 of the criteria numbered above, consider rescheduling bowel program to evening 1700 or 0500.)     
The patient is Watcher - Medium risk of patient condition declining or worsening    Shift Goals  Clinical Goals: Safety, pain management  Patient Goals: Pain management, self cath  Family Goals: pain control      Problem: Knowledge Deficit - Standard  Goal: Patient and family/care givers will demonstrate understanding of plan of care, disease process/condition, diagnostic tests and medications  Outcome: Progressing    Patient states understanding of plan of care and procedures.  Self-caths without difficulty.       Problem: Skin Integrity  Goal: Skin integrity is maintained or improved  Outcome: Progressing     Incision and wound healing well.  SANDEE without drainage.  
The patient is Watcher - Medium risk of patient condition declining or worsening    Shift Goals  Clinical Goals: safety    Problem: Neurogenic Bladder  Goal: Patient will demonstrate ability to take care of indwelling catheter  Note: Patient is performing self ICP      Problem: Pain - Standard  Goal: Alleviation of pain or a reduction in pain to the patient’s comfort goal  Note: Patient is having back and shoulder pain, PRN oxy and scheduled pain meds administered.     
The patient is Watcher - Medium risk of patient condition declining or worsening    Shift Goals  Clinical Goals: safety, pain management    Problem: Bowel Elimination  Goal: Patient will participate in bowel management program  Note: Patient has received scheduled bowel meds, has been continent of BM.     Problem: Pain - Standard  Goal: Alleviation of pain or a reduction in pain to the patient’s comfort goal  Note: Patient is having low back pain, scheduled lidocaine patches applied and PRN oxy given.     
The patient is Watcher - Medium risk of patient condition declining or worsening    Shift Goals  Clinical Goals: safety, pain management    Problem: Neurogenic Bladder  Goal: Patient will demonstrate ability to take care of indwelling catheter  Outcome: Progressing     Problem: Pain - Standard  Goal: Alleviation of pain or a reduction in pain to the patient’s comfort goal  Note: Patient is having 7-8/10 pain to low back and shoulder, PRN oxy given.     
98.9

## 2023-06-30 PROCEDURE — RXMED WILLOW AMBULATORY MEDICATION CHARGE: Performed by: PHYSICAL MEDICINE & REHABILITATION

## 2023-07-06 ENCOUNTER — PHARMACY VISIT (OUTPATIENT)
Dept: PHARMACY | Facility: MEDICAL CENTER | Age: 43
End: 2023-07-06
Payer: COMMERCIAL

## 2023-07-10 NOTE — DISCHARGE PLANNING
CM received a call from the patient, he is back from [place] and wants outpatient orders sent to [facility]. Orders sent.

## 2023-12-04 SDOH — ECONOMIC STABILITY: FOOD INSECURITY: WITHIN THE PAST 12 MONTHS, YOU WORRIED THAT YOUR FOOD WOULD RUN OUT BEFORE YOU GOT MONEY TO BUY MORE.: NEVER TRUE

## 2023-12-04 SDOH — HEALTH STABILITY: PHYSICAL HEALTH: ON AVERAGE, HOW MANY MINUTES DO YOU ENGAGE IN EXERCISE AT THIS LEVEL?: 0 MIN

## 2023-12-04 SDOH — ECONOMIC STABILITY: FOOD INSECURITY: WITHIN THE PAST 12 MONTHS, THE FOOD YOU BOUGHT JUST DIDN'T LAST AND YOU DIDN'T HAVE MONEY TO GET MORE.: NEVER TRUE

## 2023-12-04 SDOH — ECONOMIC STABILITY: HOUSING INSECURITY
IN THE LAST 12 MONTHS, WAS THERE A TIME WHEN YOU DID NOT HAVE A STEADY PLACE TO SLEEP OR SLEPT IN A SHELTER (INCLUDING NOW)?: YES

## 2023-12-04 SDOH — ECONOMIC STABILITY: INCOME INSECURITY: HOW HARD IS IT FOR YOU TO PAY FOR THE VERY BASICS LIKE FOOD, HOUSING, MEDICAL CARE, AND HEATING?: VERY HARD

## 2023-12-04 SDOH — ECONOMIC STABILITY: HOUSING INSECURITY
IN THE LAST 12 MONTHS, WAS THERE A TIME WHEN YOU DID NOT HAVE A STEADY PLACE TO SLEEP OR SLEPT IN A SHELTER (INCLUDING NOW)?: NO

## 2023-12-04 SDOH — ECONOMIC STABILITY: INCOME INSECURITY: IN THE LAST 12 MONTHS, WAS THERE A TIME WHEN YOU WERE NOT ABLE TO PAY THE MORTGAGE OR RENT ON TIME?: YES

## 2023-12-04 SDOH — ECONOMIC STABILITY: HOUSING INSECURITY: IN THE LAST 12 MONTHS, HOW MANY PLACES HAVE YOU LIVED?: 1

## 2023-12-04 SDOH — HEALTH STABILITY: MENTAL HEALTH
STRESS IS WHEN SOMEONE FEELS TENSE, NERVOUS, ANXIOUS, OR CAN'T SLEEP AT NIGHT BECAUSE THEIR MIND IS TROUBLED. HOW STRESSED ARE YOU?: NOT AT ALL

## 2023-12-04 SDOH — ECONOMIC STABILITY: TRANSPORTATION INSECURITY
IN THE PAST 12 MONTHS, HAS LACK OF TRANSPORTATION KEPT YOU FROM MEETINGS, WORK, OR FROM GETTING THINGS NEEDED FOR DAILY LIVING?: YES

## 2023-12-04 SDOH — ECONOMIC STABILITY: TRANSPORTATION INSECURITY
IN THE PAST 12 MONTHS, HAS LACK OF RELIABLE TRANSPORTATION KEPT YOU FROM MEDICAL APPOINTMENTS, MEETINGS, WORK OR FROM GETTING THINGS NEEDED FOR DAILY LIVING?: YES

## 2023-12-04 SDOH — HEALTH STABILITY: PHYSICAL HEALTH: ON AVERAGE, HOW MANY DAYS PER WEEK DO YOU ENGAGE IN MODERATE TO STRENUOUS EXERCISE (LIKE A BRISK WALK)?: 0 DAYS

## 2023-12-04 SDOH — ECONOMIC STABILITY: TRANSPORTATION INSECURITY
IN THE PAST 12 MONTHS, HAS THE LACK OF TRANSPORTATION KEPT YOU FROM MEDICAL APPOINTMENTS OR FROM GETTING MEDICATIONS?: YES

## 2023-12-04 ASSESSMENT — SOCIAL DETERMINANTS OF HEALTH (SDOH)
HOW MANY DRINKS CONTAINING ALCOHOL DO YOU HAVE ON A TYPICAL DAY WHEN YOU ARE DRINKING: PATIENT DOES NOT DRINK
HOW HARD IS IT FOR YOU TO PAY FOR THE VERY BASICS LIKE FOOD, HOUSING, MEDICAL CARE, AND HEATING?: VERY HARD
HOW OFTEN DO YOU GET TOGETHER WITH FRIENDS OR RELATIVES?: NEVER
IN A TYPICAL WEEK, HOW MANY TIMES DO YOU TALK ON THE PHONE WITH FAMILY, FRIENDS, OR NEIGHBORS?: MORE THAN THREE TIMES A WEEK
HOW OFTEN DO YOU GET TOGETHER WITH FRIENDS OR RELATIVES?: NEVER
IN A TYPICAL WEEK, HOW MANY TIMES DO YOU TALK ON THE PHONE WITH FAMILY, FRIENDS, OR NEIGHBORS?: MORE THAN THREE TIMES A WEEK
WITHIN THE PAST 12 MONTHS, YOU WORRIED THAT YOUR FOOD WOULD RUN OUT BEFORE YOU GOT THE MONEY TO BUY MORE: NEVER TRUE
HOW OFTEN DO YOU HAVE A DRINK CONTAINING ALCOHOL: NEVER
HOW OFTEN DO YOU ATTEND CHURCH OR RELIGIOUS SERVICES?: NEVER
HOW OFTEN DO YOU ATTENT MEETINGS OF THE CLUB OR ORGANIZATION YOU BELONG TO?: NEVER
DO YOU BELONG TO ANY CLUBS OR ORGANIZATIONS SUCH AS CHURCH GROUPS UNIONS, FRATERNAL OR ATHLETIC GROUPS, OR SCHOOL GROUPS?: NO
HOW OFTEN DO YOU ATTEND CHURCH OR RELIGIOUS SERVICES?: NEVER
DO YOU BELONG TO ANY CLUBS OR ORGANIZATIONS SUCH AS CHURCH GROUPS UNIONS, FRATERNAL OR ATHLETIC GROUPS, OR SCHOOL GROUPS?: NO
HOW OFTEN DO YOU ATTENT MEETINGS OF THE CLUB OR ORGANIZATION YOU BELONG TO?: NEVER
HOW OFTEN DO YOU HAVE SIX OR MORE DRINKS ON ONE OCCASION: NEVER

## 2023-12-04 ASSESSMENT — LIFESTYLE VARIABLES
SKIP TO QUESTIONS 9-10: 1
HOW OFTEN DO YOU HAVE A DRINK CONTAINING ALCOHOL: NEVER
HOW MANY STANDARD DRINKS CONTAINING ALCOHOL DO YOU HAVE ON A TYPICAL DAY: PATIENT DOES NOT DRINK
AUDIT-C TOTAL SCORE: 0
HOW OFTEN DO YOU HAVE SIX OR MORE DRINKS ON ONE OCCASION: NEVER

## 2023-12-06 ENCOUNTER — APPOINTMENT (OUTPATIENT)
Dept: MEDICAL GROUP | Facility: PHYSICIAN GROUP | Age: 43
End: 2023-12-06
Payer: COMMERCIAL

## 2023-12-06 ENCOUNTER — HOSPITAL ENCOUNTER (OUTPATIENT)
Dept: LAB | Facility: MEDICAL CENTER | Age: 43
End: 2023-12-06
Attending: STUDENT IN AN ORGANIZED HEALTH CARE EDUCATION/TRAINING PROGRAM
Payer: COMMERCIAL

## 2023-12-06 ENCOUNTER — OFFICE VISIT (OUTPATIENT)
Dept: MEDICAL GROUP | Facility: IMAGING CENTER | Age: 43
End: 2023-12-06
Payer: COMMERCIAL

## 2023-12-06 VITALS
SYSTOLIC BLOOD PRESSURE: 130 MMHG | TEMPERATURE: 97.4 F | BODY MASS INDEX: 34.86 KG/M2 | WEIGHT: 230 LBS | RESPIRATION RATE: 16 BRPM | OXYGEN SATURATION: 100 % | HEART RATE: 71 BPM | HEIGHT: 68 IN | DIASTOLIC BLOOD PRESSURE: 76 MMHG

## 2023-12-06 DIAGNOSIS — E66.9 OBESITY (BMI 30-39.9): ICD-10-CM

## 2023-12-06 DIAGNOSIS — R73.03 PREDIABETES: ICD-10-CM

## 2023-12-06 DIAGNOSIS — E78.2 MIXED HYPERLIPIDEMIA: ICD-10-CM

## 2023-12-06 DIAGNOSIS — Z23 ENCOUNTER FOR ADMINISTRATION OF VACCINE: ICD-10-CM

## 2023-12-06 DIAGNOSIS — S24.103A SPINAL CORD INJURY AT T7-T12 LEVEL (HCC): ICD-10-CM

## 2023-12-06 DIAGNOSIS — Z11.4 SCREENING FOR HIV (HUMAN IMMUNODEFICIENCY VIRUS): ICD-10-CM

## 2023-12-06 DIAGNOSIS — Z11.59 NEED FOR HEPATITIS C SCREENING TEST: ICD-10-CM

## 2023-12-06 DIAGNOSIS — Z78.9 IMPAIRED MOBILITY AND ADLS: ICD-10-CM

## 2023-12-06 DIAGNOSIS — Z74.09 IMPAIRED MOBILITY AND ADLS: ICD-10-CM

## 2023-12-06 PROBLEM — S32.009A LUMBAR TRANSVERSE PROCESS FRACTURE, CLOSED, INITIAL ENCOUNTER (HCC): Status: RESOLVED | Noted: 2023-05-17 | Resolved: 2023-12-06

## 2023-12-06 PROBLEM — Z12.5 ENCOUNTER FOR SCREENING FOR MALIGNANT NEOPLASM OF PROSTATE: Status: RESOLVED | Noted: 2021-07-15 | Resolved: 2023-12-06

## 2023-12-06 PROBLEM — M25.511 ACUTE PAIN OF RIGHT SHOULDER: Status: RESOLVED | Noted: 2023-05-23 | Resolved: 2023-12-06

## 2023-12-06 PROBLEM — F10.10 ALCOHOL ABUSE: Status: RESOLVED | Noted: 2018-09-07 | Resolved: 2023-12-06

## 2023-12-06 PROBLEM — Z72.0 TOBACCO ABUSE: Status: RESOLVED | Noted: 2018-09-07 | Resolved: 2023-12-06

## 2023-12-06 PROBLEM — K62.5 BRBPR (BRIGHT RED BLOOD PER RECTUM): Status: RESOLVED | Noted: 2020-10-26 | Resolved: 2023-12-06

## 2023-12-06 PROBLEM — M25.562 ACUTE PAIN OF LEFT KNEE: Status: RESOLVED | Noted: 2023-05-22 | Resolved: 2023-12-06

## 2023-12-06 LAB
ALBUMIN SERPL BCP-MCNC: 4.3 G/DL (ref 3.2–4.9)
ALBUMIN/GLOB SERPL: 1.2 G/DL
ALP SERPL-CCNC: 60 U/L (ref 30–99)
ALT SERPL-CCNC: 19 U/L (ref 2–50)
ANION GAP SERPL CALC-SCNC: 12 MMOL/L (ref 7–16)
AST SERPL-CCNC: 15 U/L (ref 12–45)
BASOPHILS # BLD AUTO: 0.7 % (ref 0–1.8)
BASOPHILS # BLD: 0.05 K/UL (ref 0–0.12)
BILIRUB SERPL-MCNC: 0.5 MG/DL (ref 0.1–1.5)
BUN SERPL-MCNC: 8 MG/DL (ref 8–22)
CALCIUM ALBUM COR SERPL-MCNC: 9.5 MG/DL (ref 8.5–10.5)
CALCIUM SERPL-MCNC: 9.7 MG/DL (ref 8.5–10.5)
CHLORIDE SERPL-SCNC: 106 MMOL/L (ref 96–112)
CHOLEST SERPL-MCNC: 199 MG/DL (ref 100–199)
CO2 SERPL-SCNC: 25 MMOL/L (ref 20–33)
CREAT SERPL-MCNC: 0.75 MG/DL (ref 0.5–1.4)
EOSINOPHIL # BLD AUTO: 0.04 K/UL (ref 0–0.51)
EOSINOPHIL NFR BLD: 0.6 % (ref 0–6.9)
ERYTHROCYTE [DISTWIDTH] IN BLOOD BY AUTOMATED COUNT: 49.3 FL (ref 35.9–50)
EST. AVERAGE GLUCOSE BLD GHB EST-MCNC: 126 MG/DL
FASTING STATUS PATIENT QL REPORTED: NORMAL
GFR SERPLBLD CREATININE-BSD FMLA CKD-EPI: 114 ML/MIN/1.73 M 2
GLOBULIN SER CALC-MCNC: 3.5 G/DL (ref 1.9–3.5)
GLUCOSE SERPL-MCNC: 85 MG/DL (ref 65–99)
HBA1C MFR BLD: 6 % (ref 4–5.6)
HCT VFR BLD AUTO: 46.6 % (ref 42–52)
HDLC SERPL-MCNC: 49 MG/DL
HGB BLD-MCNC: 15.2 G/DL (ref 14–18)
IMM GRANULOCYTES # BLD AUTO: 0.01 K/UL (ref 0–0.11)
IMM GRANULOCYTES NFR BLD AUTO: 0.1 % (ref 0–0.9)
LDLC SERPL CALC-MCNC: 131 MG/DL
LYMPHOCYTES # BLD AUTO: 2.59 K/UL (ref 1–4.8)
LYMPHOCYTES NFR BLD: 37.3 % (ref 22–41)
MCH RBC QN AUTO: 28.5 PG (ref 27–33)
MCHC RBC AUTO-ENTMCNC: 32.6 G/DL (ref 32.3–36.5)
MCV RBC AUTO: 87.3 FL (ref 81.4–97.8)
MONOCYTES # BLD AUTO: 0.62 K/UL (ref 0–0.85)
MONOCYTES NFR BLD AUTO: 8.9 % (ref 0–13.4)
NEUTROPHILS # BLD AUTO: 3.63 K/UL (ref 1.82–7.42)
NEUTROPHILS NFR BLD: 52.4 % (ref 44–72)
NRBC # BLD AUTO: 0 K/UL
NRBC BLD-RTO: 0 /100 WBC (ref 0–0.2)
PLATELET # BLD AUTO: 244 K/UL (ref 164–446)
PMV BLD AUTO: 11.7 FL (ref 9–12.9)
POTASSIUM SERPL-SCNC: 4 MMOL/L (ref 3.6–5.5)
PROT SERPL-MCNC: 7.8 G/DL (ref 6–8.2)
RBC # BLD AUTO: 5.34 M/UL (ref 4.7–6.1)
SODIUM SERPL-SCNC: 143 MMOL/L (ref 135–145)
TRIGL SERPL-MCNC: 96 MG/DL (ref 0–149)
WBC # BLD AUTO: 6.9 K/UL (ref 4.8–10.8)

## 2023-12-06 PROCEDURE — 80053 COMPREHEN METABOLIC PANEL: CPT

## 2023-12-06 PROCEDURE — 90636 HEP A/HEP B VACC ADULT IM: CPT | Performed by: STUDENT IN AN ORGANIZED HEALTH CARE EDUCATION/TRAINING PROGRAM

## 2023-12-06 PROCEDURE — 90471 IMMUNIZATION ADMIN: CPT | Performed by: STUDENT IN AN ORGANIZED HEALTH CARE EDUCATION/TRAINING PROGRAM

## 2023-12-06 PROCEDURE — 80061 LIPID PANEL: CPT

## 2023-12-06 PROCEDURE — 82306 VITAMIN D 25 HYDROXY: CPT

## 2023-12-06 PROCEDURE — 3075F SYST BP GE 130 - 139MM HG: CPT | Performed by: STUDENT IN AN ORGANIZED HEALTH CARE EDUCATION/TRAINING PROGRAM

## 2023-12-06 PROCEDURE — 36415 COLL VENOUS BLD VENIPUNCTURE: CPT

## 2023-12-06 PROCEDURE — 85025 COMPLETE CBC W/AUTO DIFF WBC: CPT

## 2023-12-06 PROCEDURE — 87389 HIV-1 AG W/HIV-1&-2 AB AG IA: CPT

## 2023-12-06 PROCEDURE — 84443 ASSAY THYROID STIM HORMONE: CPT

## 2023-12-06 PROCEDURE — 86803 HEPATITIS C AB TEST: CPT

## 2023-12-06 PROCEDURE — 99214 OFFICE O/P EST MOD 30 MIN: CPT | Mod: 25 | Performed by: STUDENT IN AN ORGANIZED HEALTH CARE EDUCATION/TRAINING PROGRAM

## 2023-12-06 PROCEDURE — 3078F DIAST BP <80 MM HG: CPT | Performed by: STUDENT IN AN ORGANIZED HEALTH CARE EDUCATION/TRAINING PROGRAM

## 2023-12-06 PROCEDURE — 83036 HEMOGLOBIN GLYCOSYLATED A1C: CPT

## 2023-12-06 ASSESSMENT — FIBROSIS 4 INDEX: FIB4 SCORE: 0.32
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
## 2023-12-06 NOTE — PATIENT INSTRUCTIONS
Thank you for choosing [facility]. It was a pleasure meeting you today.     Take care!  [facility]ua [facility]

## 2023-12-07 PROBLEM — E78.5 DYSLIPIDEMIA: Status: ACTIVE | Noted: 2018-09-12

## 2023-12-07 LAB
25(OH)D3 SERPL-MCNC: 31 NG/ML (ref 30–100)
HCV AB SER QL: NORMAL
HIV 1+2 AB+HIV1 P24 AG SERPL QL IA: NORMAL
TSH SERPL DL<=0.005 MIU/L-ACNC: 2.09 UIU/ML (ref 0.38–5.33)

## 2024-01-08 ENCOUNTER — HOSPITAL ENCOUNTER (OUTPATIENT)
Dept: LAB | Facility: MEDICAL CENTER | Age: 44
End: 2024-01-08
Attending: PHYSICIAN ASSISTANT
Payer: COMMERCIAL

## 2024-01-08 LAB
BASOPHILS # BLD AUTO: 0.9 % (ref 0–1.8)
BASOPHILS # BLD: 0.08 K/UL (ref 0–0.12)
EOSINOPHIL # BLD AUTO: 0.07 K/UL (ref 0–0.51)
EOSINOPHIL NFR BLD: 0.8 % (ref 0–6.9)
ERYTHROCYTE [DISTWIDTH] IN BLOOD BY AUTOMATED COUNT: 48.5 FL (ref 35.9–50)
ESTRADIOL SERPL-MCNC: 19.3 PG/ML
HCT VFR BLD AUTO: 43.5 % (ref 42–52)
HGB BLD-MCNC: 14.1 G/DL (ref 14–18)
IMM GRANULOCYTES # BLD AUTO: 0.02 K/UL (ref 0–0.11)
IMM GRANULOCYTES NFR BLD AUTO: 0.2 % (ref 0–0.9)
LYMPHOCYTES # BLD AUTO: 2.71 K/UL (ref 1–4.8)
LYMPHOCYTES NFR BLD: 29.6 % (ref 22–41)
MCH RBC QN AUTO: 28.8 PG (ref 27–33)
MCHC RBC AUTO-ENTMCNC: 32.4 G/DL (ref 32.3–36.5)
MCV RBC AUTO: 89 FL (ref 81.4–97.8)
MONOCYTES # BLD AUTO: 0.77 K/UL (ref 0–0.85)
MONOCYTES NFR BLD AUTO: 8.4 % (ref 0–13.4)
NEUTROPHILS # BLD AUTO: 5.52 K/UL (ref 1.82–7.42)
NEUTROPHILS NFR BLD: 60.1 % (ref 44–72)
NRBC # BLD AUTO: 0 K/UL
NRBC BLD-RTO: 0 /100 WBC (ref 0–0.2)
PLATELET # BLD AUTO: 242 K/UL (ref 164–446)
PMV BLD AUTO: 11.5 FL (ref 9–12.9)
RBC # BLD AUTO: 4.89 M/UL (ref 4.7–6.1)
TESTOST SERPL-MCNC: 118 NG/DL (ref 175–781)
WBC # BLD AUTO: 9.2 K/UL (ref 4.8–10.8)

## 2024-01-08 PROCEDURE — 82670 ASSAY OF TOTAL ESTRADIOL: CPT

## 2024-01-08 PROCEDURE — 84403 ASSAY OF TOTAL TESTOSTERONE: CPT

## 2024-01-08 PROCEDURE — 36415 COLL VENOUS BLD VENIPUNCTURE: CPT

## 2024-01-08 PROCEDURE — 85025 COMPLETE CBC W/AUTO DIFF WBC: CPT

## 2024-01-22 ENCOUNTER — TELEMEDICINE (OUTPATIENT)
Dept: MEDICAL GROUP | Facility: IMAGING CENTER | Age: 44
End: 2024-01-22
Payer: COMMERCIAL

## 2024-01-22 ENCOUNTER — TELEPHONE (OUTPATIENT)
Dept: MEDICAL GROUP | Facility: IMAGING CENTER | Age: 44
End: 2024-01-22

## 2024-01-22 VITALS — BODY MASS INDEX: 33.34 KG/M2 | HEIGHT: 68 IN | WEIGHT: 220 LBS

## 2024-01-22 DIAGNOSIS — E29.1 HYPOGONADISM IN MALE: ICD-10-CM

## 2024-01-22 DIAGNOSIS — Z71.2 ENCOUNTER TO DISCUSS TEST RESULTS: ICD-10-CM

## 2024-01-22 DIAGNOSIS — E78.5 DYSLIPIDEMIA: ICD-10-CM

## 2024-01-22 DIAGNOSIS — R73.03 PREDIABETES: ICD-10-CM

## 2024-01-22 DIAGNOSIS — Z02.89 ENCOUNTER FOR COMPLETION OF FORM WITH PATIENT: ICD-10-CM

## 2024-01-22 PROCEDURE — 99214 OFFICE O/P EST MOD 30 MIN: CPT | Mod: 95 | Performed by: STUDENT IN AN ORGANIZED HEALTH CARE EDUCATION/TRAINING PROGRAM

## 2024-01-22 ASSESSMENT — FIBROSIS 4 INDEX: FIB4 SCORE: 0.63

## 2024-01-22 ASSESSMENT — PAIN SCALES - GENERAL: PAINLEVEL: NO PAIN

## 2024-01-22 ASSESSMENT — PATIENT HEALTH QUESTIONNAIRE - PHQ9: CLINICAL INTERPRETATION OF PHQ2 SCORE: 0

## 2024-01-22 NOTE — LETTER
January 22, 2024        Bharath Diaz        To whom it may concern,    Due to patient's current state of health, he needs the following restrictions at work.   He cannot stand longer than one hour.   He cannot lift more than 50 pounds.   If you need additional information or have questions do not hesitate in contacting me.         Sincerely,     Akosua Bray P.A.-C.

## 2024-01-22 NOTE — PROGRESS NOTES
"Virtual Visit: Established Patient   This visit was conducted via Zoom using secure and encrypted videoconferencing technology. The patient was in a private location in the state of [place].    The patient's identity was confirmed and verbal consent was obtained for this virtual visit.    Subjective:   CC:   Chief Complaint   Patient presents with    Paperwork       Bharath Diaz is a 43 y.o. male presenting for evaluation and management of:    Work restrictions: pt is requesting the following restrictions for work: avoiding standing longer than one hour and no lifting more than 50 pounds. Pt is a . Pt has impaired mobility due to a spinal cord injury.           ROS:   No CP, SOB, or fever.     Medications, allergies, past medical history, family history, surgical history, and social history documented in chart and reviewed by me.        Objective:   Ht 1.727 m (5' 8\")   Wt 99.8 kg (220 lb)   BMI 33.45 kg/m²     Physical Exam:   Constitutional: Alert, no distress, well-groomed.  Skin: No rashes in visible areas.  Eyes: Round. Conjunctiva clear, lids normal. No icterus.   ENMT: Lips pink without lesions, moist mucous membranes. Phonation normal.  Neck: No visible masses or thyromegaly.   Respiratory: Unlabored respiratory effort, no cough or audible wheeze.  Psych: Alert and oriented x3, normal affect and mood.       Assessment and Plan:     1. Encounter for completion of form with patient  Restriction work letter provided to pt as requested.     2. Encounter to discuss test results  CMP, A1C, CBC, TSH, lipid, Vitamin D, Hep C, and HIV reviewed today with pt. Will continue with yearly labs or sooner if indicated.      3. Prediabetes  Chronic.  Recommend healthy diet.  Patient cannot exercise at this time due to spinal cord injury.  Will recheck in 6 months.   Latest Reference Range & Units 12/06/23 11:24   Glycohemoglobin 4.0 - 5.6 % 6.0 (H)   (H): Data is abnormally high    4. " Dyslipidemia  Chronic. Lipid panel reviewed with pt today. The 10-year ASCVD risk score (Sigrid ALANIS, et al., 2019) is: 1.9%. No statin indicated at this time. Recommend healthy diet. Will continue with yearly lipid panel checks or sooner if indicated.     Latest Reference Range & Units 12/06/23 11:24   Cholesterol,Tot 100 - 199 mg/dL 199   Triglycerides 0 - 149 mg/dL 96   HDL >=40 mg/dL 49   LDL <100 mg/dL 131 (H)   (H): Data is abnormally high    5. Hypogonadism in male  Chronic. Established at [facility] [place]. Per patient he has been experiencing issues obtaining his testosterone prescription. Advised patient to follow-up with urology.    Latest Reference Range & Units 01/08/24 13:46   Testosterone,Total 175 - 781 ng/dL 118 (L)   (L): Data is abnormally low          Diagnosis and treatment plan explained to pt.  Pt agreed with treatment plan and verbalized understanding.     Return for as scheduled 3/4/24.     Please note that this dictation was created using voice recognition software. I have made every reasonable attempt to correct obvious errors, but I expect that there are errors of grammar and possibly content that I did not discover before finalizing the note.    Akosua Bray PA-C  [facility]

## 2024-01-22 NOTE — TELEPHONE ENCOUNTER
Phone Number Called: [phone] (home)      Call outcome: Left detailed message for patient. Informed to call back with any additional questions.    Message: Informed patient of where the DME form was faxed to. [facility]. Their phone number is [phone].

## 2024-02-13 ENCOUNTER — TELEPHONE (OUTPATIENT)
Dept: MEDICAL GROUP | Facility: IMAGING CENTER | Age: 44
End: 2024-02-13
Payer: COMMERCIAL

## 2024-02-13 NOTE — TELEPHONE ENCOUNTER
Phone Number Called: [phone] [facility]    Call outcome:  Spoke to [facility] associate     Message: Asked associate about the status of the DME referral we had sent. They informed us that the patient's insurance was not covered with them. They informed us that they do not have crutches or braces at this time.

## 2024-02-23 ENCOUNTER — TELEMEDICINE (OUTPATIENT)
Dept: MEDICAL GROUP | Facility: IMAGING CENTER | Age: 44
End: 2024-02-23
Payer: COMMERCIAL

## 2024-02-23 DIAGNOSIS — Z02.89 ENCOUNTER FOR COMPLETION OF FORM WITH PATIENT: ICD-10-CM

## 2024-02-23 PROCEDURE — 99212 OFFICE O/P EST SF 10 MIN: CPT | Mod: 95 | Performed by: STUDENT IN AN ORGANIZED HEALTH CARE EDUCATION/TRAINING PROGRAM

## 2024-02-23 NOTE — LETTER
February 23, 2024    To Whom It May Concern:         This is confirmation that Bharath Diaz attended his scheduled appointment with Akosua Bray P.A.-C. on 2/23/24. Patient may return to work without restrictions.          If you have any questions please do not hesitate to call me at the phone number listed below.    Sincerely,          Akosua Bray P.A.-C.  [phone]

## 2024-02-23 NOTE — PROGRESS NOTES
Virtual Visit: Established Patient   This visit was conducted via  zoom  using secure and encrypted videoconferencing technology. The patient was in a private location in the state of [place].    The patient's identity was confirmed and verbal consent was obtained for this virtual visit.    Subjective:   CC:   Chief Complaint   Patient presents with    Other     Work restrictions       Bharath Diaz is a 43 y.o. male presenting for evaluation and management of:    Pt is requesting a letter to lift his restrictions. States he met with his surgeon and he was clear to return to work without restrictions.  Reports doing well and does not have new concerns today.    ROS:   See HPI      Medications, allergies, past medical history, family history, surgical history, and social history documented in chart and reviewed by me.        Objective:   There were no vitals taken for this visit.    Physical Exam:   Constitutional: Alert, no distress, well-groomed.  Skin: No rashes in visible areas.  Eyes: Round. Conjunctiva clear, lids normal. No icterus.   ENMT: Lips pink without lesions, moist mucous membranes. Phonation normal.  Neck: No visible masses or thyromegaly.   Respiratory: Unlabored respiratory effort, no cough or audible wheeze.  Psych: Alert and oriented x3, normal affect and mood.       Assessment and Plan:     1. Encounter for completion of form with patient  Letter to return to work without restrictions completed today per patient's request.  Letter sent to patient via [employer].      Return for as scheduled 3/4/24.     Please note that this dictation was created using voice recognition software. I have made every reasonable attempt to correct obvious errors, but I expect that there are errors of grammar and possibly content that I did not discover before finalizing the note.    Akosua Bray PA-C  [facility]

## 2024-03-07 ENCOUNTER — OFFICE VISIT (OUTPATIENT)
Dept: MEDICAL GROUP | Facility: IMAGING CENTER | Age: 44
End: 2024-03-07
Payer: COMMERCIAL

## 2024-03-07 VITALS
RESPIRATION RATE: 16 BRPM | HEIGHT: 68 IN | SYSTOLIC BLOOD PRESSURE: 126 MMHG | BODY MASS INDEX: 33.34 KG/M2 | TEMPERATURE: 97.5 F | OXYGEN SATURATION: 98 % | WEIGHT: 220 LBS | DIASTOLIC BLOOD PRESSURE: 82 MMHG | HEART RATE: 80 BPM

## 2024-03-07 DIAGNOSIS — M79.2 NEUROPATHIC PAIN: ICD-10-CM

## 2024-03-07 DIAGNOSIS — Z23 ENCOUNTER FOR ADMINISTRATION OF VACCINE: ICD-10-CM

## 2024-03-07 PROCEDURE — 3074F SYST BP LT 130 MM HG: CPT | Performed by: STUDENT IN AN ORGANIZED HEALTH CARE EDUCATION/TRAINING PROGRAM

## 2024-03-07 PROCEDURE — 90471 IMMUNIZATION ADMIN: CPT | Performed by: STUDENT IN AN ORGANIZED HEALTH CARE EDUCATION/TRAINING PROGRAM

## 2024-03-07 PROCEDURE — 3079F DIAST BP 80-89 MM HG: CPT | Performed by: STUDENT IN AN ORGANIZED HEALTH CARE EDUCATION/TRAINING PROGRAM

## 2024-03-07 PROCEDURE — 99213 OFFICE O/P EST LOW 20 MIN: CPT | Mod: 25 | Performed by: STUDENT IN AN ORGANIZED HEALTH CARE EDUCATION/TRAINING PROGRAM

## 2024-03-07 PROCEDURE — 90636 HEP A/HEP B VACC ADULT IM: CPT | Performed by: STUDENT IN AN ORGANIZED HEALTH CARE EDUCATION/TRAINING PROGRAM

## 2024-03-07 RX ORDER — GABAPENTIN 600 MG/1
600 TABLET ORAL
Qty: 120 TABLET | Refills: 3 | Status: SHIPPED | OUTPATIENT
Start: 2024-03-07

## 2024-03-07 ASSESSMENT — FIBROSIS 4 INDEX: FIB4 SCORE: 0.63

## 2024-03-07 NOTE — PROGRESS NOTES
"Subjective:     CC:   Chief Complaint   Patient presents with    Other     Return to work restrictions    Medication Refill     Gabapentin       HPI:   Bharath García katy Diaz, 43 y.o., male,  presents today to discuss:     Paperwork:States his employer faxed paperwork for work restriction and accommodations. He has not gone back to work. He's waiting for completion of paperwork. States his walking and balance is improving. Neuropahatic pain remains the same. He needs Gabapentin refills. He is doing PT twice weekly.     Reports doing well and does not have concerns today.      ROS:  See HPI    Medications, allergies, past medical history, family history, surgical history, and social history documented in chart and reviewed by me.       Objective:   Exam:  /82 (BP Location: Right arm, Patient Position: Sitting, BP Cuff Size: Adult)   Pulse 80   Temp 36.4 °C (97.5 °F) (Temporal)   Resp 16   Ht 1.727 m (5' 8\")   Wt 99.8 kg (220 lb)   SpO2 98%   BMI 33.45 kg/m²      General: In no acute distress. Normal appearance.   Head:   Atraumatic, normocephalic.   Neck: Supple   Pulmonary: Normal effort, clear to auscultation bilaterally, no wheezes, rhonchi, or rales  Cardiovascular:   Regular rate and rhythm. No murmurs, rubs, or gallops.  Musculoskeletal:  Limited ROM   Skin: No visible rashes or lesions.  Neurological: Alert and oriented to person, place, and time. Gait normal.   Psychiatric: Normal mood and affect. Calm and friendly behavior. Speech clear. Normal judgement and insight.         Assessment & Plan:     1. Neuropathic pain  Chronic and stable.  Continues to have pain but has not worsened.  Will continue with gabapentin 600 mg twice daily and PT.  - gabapentin (NEURONTIN) 600 MG tablet; Take 1 Tablet by mouth 2 (two) times a day.  Dispense: 120 Tablet; Refill: 3    2. Encounter for administration of vaccine  Patient received second Twinrix today.  Tolerated well.  Will return for his final " dose.  - TWINRIX HepA/HepB IM     Informed patient we have not received any paperwork from his employer.  Provided patient with our fax number.  He will contact his employer and we will schedule virtual appointment for completion of paperwork once we have obtained the requested paperwork.      Diagnosis and treatment plan explained to pt.  Pt agreed with treatment plan and verbalized understanding.     Return in about 3 months (around 6/7/2024) for Annual Wellness Visit.     Please note that this dictation was created using voice recognition software. I have made every reasonable attempt to correct obvious errors, but I expect that there are errors of grammar and possibly content that I did not discover before finalizing the note.    Akosua Bray PA-C  [facility]

## 2024-03-20 ENCOUNTER — TELEMEDICINE (OUTPATIENT)
Dept: MEDICAL GROUP | Facility: IMAGING CENTER | Age: 44
End: 2024-03-20
Payer: COMMERCIAL

## 2024-03-20 VITALS — HEIGHT: 68 IN | WEIGHT: 215 LBS | BODY MASS INDEX: 32.58 KG/M2

## 2024-03-20 DIAGNOSIS — Z02.89 ENCOUNTER FOR COMPLETION OF FORM WITH PATIENT: ICD-10-CM

## 2024-03-20 PROCEDURE — 99212 OFFICE O/P EST SF 10 MIN: CPT | Mod: 95 | Performed by: STUDENT IN AN ORGANIZED HEALTH CARE EDUCATION/TRAINING PROGRAM

## 2024-03-20 ASSESSMENT — FIBROSIS 4 INDEX: FIB4 SCORE: 0.63

## 2024-03-20 NOTE — PROGRESS NOTES
"Virtual Visit: Established Patient   This visit was conducted via Zoom using secure and encrypted videoconferencing technology. The patient was in a private location in the state of [place].    The patient's identity was confirmed and verbal consent was obtained for this virtual visit.    Subjective:   CC:   Chief Complaint   Patient presents with    Paperwork       Bharath Diaz is a 43 y.o. male presenting for evaluation and management of:    Completion of paperwork: pt is requesting completion of work restriction forms as requested by his employer. Reports doing well and doesn't have concerns today.     ROS:   See HPI      Medications, allergies, past medical history, family history, surgical history, and social history documented in chart and reviewed by me.        Objective:   Ht 1.727 m (5' 8\")   Wt 97.5 kg (215 lb)   BMI 32.69 kg/m²     Physical Exam:   Constitutional: Alert, no distress, well-groomed.  Skin: No rashes in visible areas.  Eyes: Round. Conjunctiva clear, lids normal. No icterus.   ENMT: Lips pink without lesions, moist mucous membranes. Phonation normal.  Neck: No visible masses or thyromegaly.   Respiratory: Unlabored respiratory effort, no cough or audible wheeze.  Psych: Alert and oriented x3, normal affect and mood.       Assessment and Plan:     1. Encounter for completion of form with patient  Paperwork completed today to the best of my ability based on the information provided by pt. See scanned copies.        Return for as scheduled 6/7 or sooner if needed.     Please note that this dictation was created using voice recognition software. I have made every reasonable attempt to correct obvious errors, but I expect that there are errors of grammar and possibly content that I did not discover before finalizing the note.    Akosua Bray PA-C  [facility]          "

## 2024-04-26 ENCOUNTER — APPOINTMENT (OUTPATIENT)
Dept: MEDICAL GROUP | Facility: IMAGING CENTER | Age: 44
End: 2024-04-26
Payer: COMMERCIAL

## 2024-05-02 ENCOUNTER — APPOINTMENT (OUTPATIENT)
Dept: MEDICAL GROUP | Facility: IMAGING CENTER | Age: 44
End: 2024-05-02
Payer: COMMERCIAL

## 2024-05-02 VITALS — BODY MASS INDEX: 32.58 KG/M2 | HEIGHT: 68 IN | WEIGHT: 215 LBS

## 2024-05-02 DIAGNOSIS — S24.103A SPINAL CORD INJURY AT T7-T12 LEVEL (HCC): ICD-10-CM

## 2024-05-02 DIAGNOSIS — Z02.89 ENCOUNTER FOR COMPLETION OF FORM WITH PATIENT: ICD-10-CM

## 2024-05-02 PROCEDURE — 99212 OFFICE O/P EST SF 10 MIN: CPT | Mod: 95 | Performed by: STUDENT IN AN ORGANIZED HEALTH CARE EDUCATION/TRAINING PROGRAM

## 2024-05-02 ASSESSMENT — FIBROSIS 4 INDEX: FIB4 SCORE: 0.63

## 2024-05-02 NOTE — PROGRESS NOTES
"Virtual Visit: Established Patient   This visit was conducted via Zoom using secure and encrypted videoconferencing technology. The patient was in a private location in the [facility].    The patient's identity was confirmed and verbal consent was obtained for this virtual visit.    Subjective:   CC:   Chief Complaint   Patient presents with    Paperwork     Return to work paperwork       Bharath Diaz is a 43 y.o. male presenting for evaluation and management of:    Paperwork completion: pt is requesting completion of paperwork for work restriction. He is planning on starting a new job. Reports doing well and doesn't have concerns today.     ROS:   See HPI      Medications, allergies, past medical history, family history, surgical history, and social history documented in chart and reviewed by me.        Objective:   Ht 1.727 m (5' 8\")   Wt 97.5 kg (215 lb)   BMI 32.69 kg/m²     Physical Exam:   Constitutional: Alert, no distress, well-groomed.  Skin: No rashes in visible areas.  Eyes: Round. Conjunctiva clear, lids normal. No icterus.   ENMT: Lips pink without lesions, moist mucous membranes. Phonation normal.  Neck: No visible masses or thyromegaly.   Respiratory: Unlabored respiratory effort, no cough or audible wheeze.  Psych: Alert and oriented x3, normal affect and mood.       Assessment and Plan:     1. Encounter for completion of form with patient  2. Spinal cord injury at T7-T12 level (HCC)  Paperwork for work restrictions completed today as requested by pt.  See scanned copies.         Return for as scheduled 6/7/24 or sooner if needed.     Please note that this dictation was created using voice recognition software. I have made every reasonable attempt to correct obvious errors, but I expect that there are errors of grammar and possibly content that I did not discover before finalizing the note.    Akosua Bray PA-C  [facility]          "

## 2024-06-07 ENCOUNTER — APPOINTMENT (OUTPATIENT)
Dept: MEDICAL GROUP | Facility: IMAGING CENTER | Age: 44
End: 2024-06-07
Payer: COMMERCIAL

## 2024-06-18 ENCOUNTER — TELEMEDICINE (OUTPATIENT)
Dept: MEDICAL GROUP | Facility: IMAGING CENTER | Age: 44
End: 2024-06-18
Payer: COMMERCIAL

## 2024-06-18 VITALS — BODY MASS INDEX: 32.58 KG/M2 | HEIGHT: 68 IN | WEIGHT: 215 LBS

## 2024-06-18 DIAGNOSIS — Z02.89 ENCOUNTER FOR COMPLETION OF FORM WITH PATIENT: ICD-10-CM

## 2024-06-18 PROCEDURE — 99212 OFFICE O/P EST SF 10 MIN: CPT | Performed by: STUDENT IN AN ORGANIZED HEALTH CARE EDUCATION/TRAINING PROGRAM

## 2024-06-18 ASSESSMENT — FIBROSIS 4 INDEX: FIB4 SCORE: 0.63

## 2024-06-18 NOTE — PROGRESS NOTES
"Virtual Visit: Established Patient   This visit was conducted via Zoom using secure and encrypted videoconferencing technology. The patient was in a private location in the state [facility].    The patient's identity was confirmed and verbal consent was obtained for this virtual visit.    Subjective:   CC:   Chief Complaint   Patient presents with    Paperwork     Work Restriction paperwork       Bharath Diaz is a 43 y.o. male presenting for evaluation and management of:    Paperwork clarification: Pt's employer is requesting clarification for his work release and restrictions. Pt believes he has reached plateau and he might not get better.     ROS:   No CP, palpitations, fever, and chills      Medications, allergies, past medical history, family history, surgical history, and social history documented in chart and reviewed by me.        Objective:   Ht 1.727 m (5' 8\")   Wt 97.5 kg (215 lb)   BMI 32.69 kg/m²     Physical Exam:   Constitutional: Alert, no distress, well-groomed.  Skin: No rashes in visible areas.  Eyes: Round. Conjunctiva clear, lids normal. No icterus.   ENMT: Lips pink without lesions, moist mucous membranes. Phonation normal.  Neck: No visible masses or thyromegaly.   Respiratory: Unlabored respiratory effort, no cough or audible wheeze.  Psych: Alert and oriented x3, normal affect and mood.       Assessment and Plan:     1. Encounter for completion of form with patient  Paperwork completed today as requested based on the information provided by pt. See copies under media.          Return for as scheduled 6/24/24 or sooner if needed.         Please note that this dictation was created using voice recognition software. I have made every reasonable attempt to correct obvious errors, but I expect that there are errors of grammar and possibly content that I did not discover before finalizing the note.    Akosua Bray PA-C  [facility]          "

## (undated) DEVICE — SUTURE 4-0 CHROMIC GUT - 18 INCH G-3 D/A (12/BX)

## (undated) DEVICE — KIT  I.V. START (100EA/CA)

## (undated) DEVICE — SYRINGE 12 CC LUER TIP - (80/BX) OBSOLETE ITEM

## (undated) DEVICE — SUTURE 0 VICRYL PLUS CT-1 - 8 X 18 INCH (12/BX)

## (undated) DEVICE — TRAY CATHETER FOLEY URINE METER W/STATLOCK 350ML (10EA/CA)

## (undated) DEVICE — GLOVE BIOGEL SZ 8.5 SURGICAL PF LTX - (50PR/BX 4BX/CA)

## (undated) DEVICE — TUBING C&T SET FLYING LEADS DRAIN TUBING (10EA/BX)

## (undated) DEVICE — SET LEADWIRE 5 LEAD BEDSIDE DISPOSABLE ECG (1SET OF 5/EA)

## (undated) DEVICE — SUTURE GENERAL

## (undated) DEVICE — NEEDLE SPINAL NON-SAFETY 25GA X 3 (25EA/BX)"

## (undated) DEVICE — KIT ANESTHESIA W/CIRCUIT & 3/LT BAG W/FILTER (20EA/CA)

## (undated) DEVICE — GLOVE BIOGEL SZ 7 SURGICAL PF LTX - (50PR/BX 4BX/CA)

## (undated) DEVICE — PATTIES SURG X-RAYCOTTONOID - 1/2 X 3 IN (200/CA)

## (undated) DEVICE — HEADREST SHEA

## (undated) DEVICE — PROTECTOR CORNEAL - (10/BX)

## (undated) DEVICE — KIT SURGIFLO W/OUT THROMBIN - (6EA/CA)

## (undated) DEVICE — ELECTRODE DUAL RETURN W/ CORD - (50/PK)

## (undated) DEVICE — TUBE CONNECTING SUCTION - CLEAR PLASTIC STERILE 72 IN (50EA/CA)

## (undated) DEVICE — LACTATED RINGERS INJ 1000 ML - (14EA/CA 60CA/PF)

## (undated) DEVICE — CHLORAPREP 26 ML APPLICATOR - ORANGE TINT(25/CA)

## (undated) DEVICE — MEDICINE CUP STERILE 2 OZ - (100/CA)

## (undated) DEVICE — TOOL MR8 14CM MATCH HD SYM-TRI 3MM DIAMETER (1/EA)

## (undated) DEVICE — DRESSING TRANSPARENT FILM TEGADERM 2.375 X 2.75"  (100EA/BX)"

## (undated) DEVICE — SODIUM CHL IRRIGATION 0.9% 1000ML (12EA/CA)

## (undated) DEVICE — FORCEPS IRRIGATING 8 X 1.5MM (5EA/BX)

## (undated) DEVICE — BLADE SURGICAL CLIPPER - (50EA/CA)

## (undated) DEVICE — COVER LIGHT HANDLE ALC PLUS DISP (18EA/BX)

## (undated) DEVICE — LACTATED RINGERS INJ. 500 ML - (24EA/CA)

## (undated) DEVICE — CANNULA W/ SUPPLY TUBING O2 - (50/CA)

## (undated) DEVICE — SUCTION INSTRUMENT YANKAUER BULBOUS TIP W/O VENT (50EA/CA)

## (undated) DEVICE — SET EXTENSION WITH 2 PORTS (48EA/CA) ***PART #2C8610 IS A SUBSTITUTE*****

## (undated) DEVICE — DISH PETRI STERILE (50EA/CA)

## (undated) DEVICE — SPHERE NAVIGATION STEALTH (5EA/TY 12TY/PK)

## (undated) DEVICE — COVER MAYO STAND X-LG - (22EA/CA)

## (undated) DEVICE — TOOL MR8 14CM BALL SYM-TRI 5MM DIAMETER (1/EA)

## (undated) DEVICE — TUBE SHILEY ENDOTRACHEAL ORAL RAE CUFFED 8.0MM WITH TAPERGUARD (10EA/BX)

## (undated) DEVICE — TOWEL STOP TIMEOUT SAFETY FLAG (40EA/CA)

## (undated) DEVICE — SYRINGE NON SAFETY 10 CC 20 GA X 1-1/2 IN (100/BX 4BX/CA)

## (undated) DEVICE — DRAPE LARGE 3 QUARTER - (20/CA)

## (undated) DEVICE — CANISTER SUCTION 3000ML MECHANICAL FILTER AUTO SHUTOFF MEDI-VAC NONSTERILE LF DISP  (40EA/CA)

## (undated) DEVICE — HEAD HOLDER JUNIOR/ADULT

## (undated) DEVICE — SUTURE 2-0 VICRYL PLUS CT-1 - 8 X 18 INCH(12/BX)

## (undated) DEVICE — ELECTRODE 850 FOAM ADHESIVE - HYDROGEL RADIOTRNSPRNT (50/PK)

## (undated) DEVICE — DRAPE LAPAROTOMY T SHEET - (12EA/CA)

## (undated) DEVICE — SUTURE 4-0 PROLENE KS (36PK/BX)

## (undated) DEVICE — SLEEVE, VASO, THIGH, MED

## (undated) DEVICE — CATHETER IV 20 GA X 1-1/4 ---SURG.& SDS ONLY--- (50EA/BX)

## (undated) DEVICE — PATTIES SURG X-RAYCOTTONOID - 1 X 3 IN (200/CA)

## (undated) DEVICE — SENSOR SPO2 NEO LNCS ADHESIVE (20/BX) SEE USER NOTES

## (undated) DEVICE — GLOVE BIOGEL INDICATOR SZ 7SURGICAL PF LTX - (50/BX 4BX/CA)

## (undated) DEVICE — KIT EVACUATER 3 SPRING PVC LF 1/8 DRAIN SIZE (10EA/CA)"

## (undated) DEVICE — BOVIE BLADE COATED &INSULATED - 25/PK

## (undated) DEVICE — ARMREST CRADLE FOAM - (2PR/PK 12PR/CA)

## (undated) DEVICE — SENSOR OXIMETER ADULT SPO2 RD SET (20EA/BX)

## (undated) DEVICE — PACK JACKSON TABLE KIT W/OUT - HR (6EA/CA)

## (undated) DEVICE — BONE MILL BM210

## (undated) DEVICE — SOD. CHL. INJ. 0.9% 1000 ML - (14EA/CA 60CA/PF)

## (undated) DEVICE — CATHETER IV NON-SAFETY 18 GA X 1 1/4 (50/BX 4BX/CA)

## (undated) DEVICE — TUBING CLEARLINK DUO-VENT - C-FLO (48EA/CA)

## (undated) DEVICE — SPLINT NASAL DOYLE AIRWAY (2EA/ST)

## (undated) DEVICE — PACK NEURO - (2EA/CA)

## (undated) DEVICE — SPONGE GAUZESTER. 2X2 4-PL - (2/PK 50PK/BX 30BX/CS)

## (undated) DEVICE — DRAPE STRLE REG TOWEL 18X24 - (10/BX 4BX/CA)"

## (undated) DEVICE — PROTECTOR ULNA NERVE - (36PR/CA)

## (undated) DEVICE — BLADE SURGICAL #15 - (50/BX 3BX/CA)

## (undated) DEVICE — SPONGE GAUZE STER 4X4 8-PL - (2/PK 50PK/BX 12BX/CS)

## (undated) DEVICE — HEADREST PRONEVIEW LARGE - (10/CA)

## (undated) DEVICE — GLOVE SZ 8 BIOGEL PI MICRO - PF LF (50PR/BX)

## (undated) DEVICE — CANISTER SUCTION RIGID RED 1500CC (40EA/CA)

## (undated) DEVICE — PACK ENT OR - (2EA/CA)

## (undated) DEVICE — MASK ANESTHESIA ADULT  - (100/CA)

## (undated) DEVICE — DRAPE 36X28IN RAD CARM BND BG - (25/CA) O

## (undated) DEVICE — SUTURE 4-0 MONOCRYL PLUS PS-1 - 27 INCH (36/BX)

## (undated) DEVICE — WATER IRRIGATION STERILE 1000ML (12EA/CA)

## (undated) DEVICE — GOWN WARMING STANDARD FLEX - (30/CA)

## (undated) DEVICE — SUTURE 2-0 ETHILON FS - (36/BX) 18 INCH

## (undated) DEVICE — SUTURE 2-0 VICRYL CT-2  8 X 18 INCH (12EA/BX)